# Patient Record
Sex: FEMALE | Race: WHITE | Employment: OTHER | URBAN - METROPOLITAN AREA
[De-identification: names, ages, dates, MRNs, and addresses within clinical notes are randomized per-mention and may not be internally consistent; named-entity substitution may affect disease eponyms.]

---

## 2019-04-02 ENCOUNTER — APPOINTMENT (OUTPATIENT)
Dept: PHYSICAL THERAPY | Age: 69
End: 2019-04-02

## 2019-04-04 ENCOUNTER — HOSPITAL ENCOUNTER (OUTPATIENT)
Dept: PHYSICAL THERAPY | Age: 69
End: 2019-04-04

## 2020-05-21 NOTE — PERIOP NOTES
Pt notified that a negative Covid swab result is required to proceed with surgery; the swab will be collected 7 days prior to surgery at the [facility] at [address] [place] [address]. Appointment scheduled 5/26/20 at 1300.

## 2020-05-26 LAB — SARS-COV-2, NAA: NOT DETECTED

## 2020-06-01 ENCOUNTER — ANESTHESIA EVENT (OUTPATIENT)
Dept: SURGERY | Age: 70
End: 2020-06-01

## 2020-06-01 RX ORDER — DIPHENHYDRAMINE HYDROCHLORIDE 50 MG/ML
12.5 INJECTION, SOLUTION INTRAMUSCULAR; INTRAVENOUS
Status: CANCELLED | OUTPATIENT
Start: 2020-06-01 | End: 2020-06-02

## 2020-06-01 RX ORDER — OXYCODONE HYDROCHLORIDE 5 MG/1
5 TABLET ORAL
Status: CANCELLED | OUTPATIENT
Start: 2020-06-01 | End: 2020-06-02

## 2020-06-01 RX ORDER — SODIUM CHLORIDE 0.9 % (FLUSH) 0.9 %
5-40 SYRINGE (ML) INJECTION EVERY 8 HOURS
Status: CANCELLED | OUTPATIENT
Start: 2020-06-01

## 2020-06-01 RX ORDER — SODIUM CHLORIDE, SODIUM LACTATE, POTASSIUM CHLORIDE, CALCIUM CHLORIDE 600; 310; 30; 20 MG/100ML; MG/100ML; MG/100ML; MG/100ML
100 INJECTION, SOLUTION INTRAVENOUS CONTINUOUS
Status: CANCELLED | OUTPATIENT
Start: 2020-06-01

## 2020-06-01 RX ORDER — OXYCODONE AND ACETAMINOPHEN 5; 325 MG/1; MG/1
1 TABLET ORAL AS NEEDED
Status: CANCELLED | OUTPATIENT
Start: 2020-06-01

## 2020-06-01 RX ORDER — SODIUM CHLORIDE 0.9 % (FLUSH) 0.9 %
5-40 SYRINGE (ML) INJECTION AS NEEDED
Status: CANCELLED | OUTPATIENT
Start: 2020-06-01

## 2020-06-01 RX ORDER — HYDROMORPHONE HYDROCHLORIDE 2 MG/ML
0.5 INJECTION, SOLUTION INTRAMUSCULAR; INTRAVENOUS; SUBCUTANEOUS
Status: CANCELLED | OUTPATIENT
Start: 2020-06-01

## 2020-06-02 ENCOUNTER — ANESTHESIA (OUTPATIENT)
Dept: SURGERY | Age: 70
End: 2020-06-02

## 2020-06-02 ENCOUNTER — HOSPITAL ENCOUNTER (OUTPATIENT)
Age: 70
Setting detail: OUTPATIENT SURGERY
Discharge: HOME | End: 2020-06-02
Attending: SURGERY | Admitting: SURGERY

## 2020-06-02 VITALS
HEART RATE: 64 BPM | DIASTOLIC BLOOD PRESSURE: 70 MMHG | OXYGEN SATURATION: 98 % | RESPIRATION RATE: 16 BRPM | SYSTOLIC BLOOD PRESSURE: 153 MMHG | TEMPERATURE: 98.4 F

## 2020-06-02 RX ORDER — SODIUM CHLORIDE 9 MG/ML
50 INJECTION, SOLUTION INTRAVENOUS CONTINUOUS
Status: DISCONTINUED | OUTPATIENT
Start: 2020-06-02 | End: 2020-06-02 | Stop reason: HOSPADM

## 2020-06-02 RX ORDER — SODIUM CHLORIDE 0.9 % (FLUSH) 0.9 %
5-40 SYRINGE (ML) INJECTION AS NEEDED
Status: DISCONTINUED | OUTPATIENT
Start: 2020-06-02 | End: 2020-06-02 | Stop reason: HOSPADM

## 2020-06-02 RX ORDER — LIDOCAINE HYDROCHLORIDE 10 MG/ML
0.1 INJECTION INFILTRATION; PERINEURAL AS NEEDED
Status: DISCONTINUED | OUTPATIENT
Start: 2020-06-02 | End: 2020-06-02 | Stop reason: HOSPADM

## 2020-06-02 RX ORDER — CEFAZOLIN SODIUM/WATER 2 G/20 ML
2 SYRINGE (ML) INTRAVENOUS ONCE
Status: DISCONTINUED | OUTPATIENT
Start: 2020-06-02 | End: 2020-06-02 | Stop reason: HOSPADM

## 2020-06-02 RX ORDER — FENTANYL CITRATE 50 UG/ML
25 INJECTION, SOLUTION INTRAMUSCULAR; INTRAVENOUS ONCE
Status: DISCONTINUED | OUTPATIENT
Start: 2020-06-02 | End: 2020-06-02 | Stop reason: HOSPADM

## 2020-06-02 RX ORDER — MIDAZOLAM HYDROCHLORIDE 1 MG/ML
2 INJECTION, SOLUTION INTRAMUSCULAR; INTRAVENOUS
Status: DISCONTINUED | OUTPATIENT
Start: 2020-06-02 | End: 2020-06-02 | Stop reason: HOSPADM

## 2020-06-02 RX ORDER — SCOLOPAMINE TRANSDERMAL SYSTEM 1 MG/1
1 PATCH, EXTENDED RELEASE TRANSDERMAL
Status: DISCONTINUED | OUTPATIENT
Start: 2020-06-02 | End: 2020-06-02 | Stop reason: HOSPADM

## 2020-06-02 RX ORDER — SODIUM CHLORIDE, SODIUM LACTATE, POTASSIUM CHLORIDE, CALCIUM CHLORIDE 600; 310; 30; 20 MG/100ML; MG/100ML; MG/100ML; MG/100ML
75 INJECTION, SOLUTION INTRAVENOUS CONTINUOUS
Status: DISCONTINUED | OUTPATIENT
Start: 2020-06-02 | End: 2020-06-02 | Stop reason: HOSPADM

## 2020-06-02 RX ORDER — FAMOTIDINE 20 MG/1
20 TABLET, FILM COATED ORAL ONCE
Status: DISCONTINUED | OUTPATIENT
Start: 2020-06-02 | End: 2020-06-02 | Stop reason: HOSPADM

## 2020-06-02 RX ORDER — SODIUM CHLORIDE 0.9 % (FLUSH) 0.9 %
5-40 SYRINGE (ML) INJECTION EVERY 8 HOURS
Status: DISCONTINUED | OUTPATIENT
Start: 2020-06-02 | End: 2020-06-02 | Stop reason: HOSPADM

## 2020-06-02 RX ORDER — MIDAZOLAM HYDROCHLORIDE 1 MG/ML
2 INJECTION, SOLUTION INTRAMUSCULAR; INTRAVENOUS ONCE
Status: DISCONTINUED | OUTPATIENT
Start: 2020-06-02 | End: 2020-06-02 | Stop reason: HOSPADM

## 2020-06-02 NOTE — PERIOP NOTES
Pt and sister have been kept informed that power was out and there are issues with computers and OR temps. We have been waiting to get approval to proceed with surgery from admin. Dr Milagro Rivera called and said he would like to reschedule his surgeries and for pt to call the office to reschedule. Pt and sister informed.

## 2020-06-02 NOTE — ANESTHESIA PREPROCEDURE EVALUATION
Relevant Problems   No relevant active problems       Anesthetic History     PONV          Review of Systems / Medical History  Patient summary reviewed and pertinent labs reviewed    Pulmonary            Asthma (as child)        Neuro/Psych   Within defined limits           Cardiovascular    Hypertension: well controlled        Dysrhythmias : SVT  Hyperlipidemia    Exercise tolerance: >4 METS  Comments: S/p ablation   GI/Hepatic/Renal     GERD: well controlled           Endo/Other      Hypothyroidism: well controlled  Obesity and arthritis     Other Findings              Physical Exam    Airway  Mallampati: I  TM Distance: 4 - 6 cm  Neck ROM: normal range of motion   Mouth opening: Normal     Cardiovascular  Regular rate and rhythm,  S1 and S2 normal,  no murmur, click, rub, or gallop  Rhythm: regular  Rate: normal         Dental  No notable dental hx       Pulmonary  Breath sounds clear to auscultation               Abdominal  Abdominal exam normal       Other Findings            Anesthetic Plan    ASA: 2  Anesthesia type: general          Induction: Intravenous  Anesthetic plan and risks discussed with: Patient

## 2020-06-02 NOTE — H&P
CC: macromastia    HPI: 67 yo with longstanding history of large breast size, c/o shoulder, back, neck pain . Past Medical History:   Diagnosis Date    Anemia     taking iron    Arrhythmia     had ablation-2008- no problems since    Arthritis     osteoarthritis knees, fingers    Asthma      no problems except in fall season-- no meds regularly    Gastrointestinal disorder     GERD    GERD (gastroesophageal reflux disease)     on medication    High cholesterol     on medication    Hypertension x4 yrs     controlled with med     Morbid obesity ([address]     bmi-42.2 on 5-20-14    Nausea & vomiting     not every time per pt    Other ill-defined conditions(799.89)     hyperlipidemia    Psychiatric disorder     depression    Thyroid disease      hypo-med     Past Surgical History:   Procedure Laterality Date    CARDIAC SURG PROCEDURE UNLIST      Ablation 6/2008 for Arrhythmia    HX APPENDECTOMY  1972    removed with Tubal    HX CHOLECYSTECTOMY      HX HYSTERECTOMY      HX ORTHOPAEDIC  2010    right knee replaced    HX TUBAL LIGATION       Visit Vitals  /70 (BP 1 Location: Right arm, BP Patient Position: At rest)   Pulse 64   Temp 98.4 °F (36.9 °C)   Resp 16   SpO2 98%     A&O x3  RRR  Resp clear  Wise pattern reduction marked in upright position    A/P  Will proceed with wise pattern reduction. She understands risks, benefits, alternatives. Consent affirmed. Understands the possibility of requiring nipple grafting.

## 2020-07-02 ENCOUNTER — HOSPITAL ENCOUNTER (OUTPATIENT)
Dept: LAB | Age: 70
Discharge: HOME | End: 2020-07-02
Payer: MEDICARE

## 2020-07-02 LAB
CREAT SERPL-MCNC: 0.64 MG/DL (ref 0.6–1)
HGB BLD-MCNC: 13.1 G/DL (ref 11.7–15.4)
POTASSIUM SERPL-SCNC: 3.8 MMOL/L (ref 3.5–5.1)

## 2020-07-02 PROCEDURE — 36415 COLL VENOUS BLD VENIPUNCTURE: CPT

## 2020-07-02 PROCEDURE — 84132 ASSAY OF SERUM POTASSIUM: CPT

## 2020-07-02 PROCEDURE — 82565 ASSAY OF CREATININE: CPT

## 2020-07-02 PROCEDURE — 85018 HEMOGLOBIN: CPT

## 2020-07-06 ENCOUNTER — ANESTHESIA EVENT (OUTPATIENT)
Dept: SURGERY | Age: 70
End: 2020-07-06
Payer: MEDICARE

## 2020-07-06 NOTE — PERIOP NOTES
Verified with [facility] that there is no COVID test on file for patient. Spoke with office and confirmed that surgery is urgent and needs to proceed. Approved by Medical Executive Committee.     Pt instructed to arrive to [facility]  Discharge area at [place] [id number] for rapid covid testupon arrival

## 2020-07-07 ENCOUNTER — ANESTHESIA (OUTPATIENT)
Dept: SURGERY | Age: 70
End: 2020-07-07
Payer: MEDICARE

## 2020-07-07 ENCOUNTER — HOSPITAL ENCOUNTER (OUTPATIENT)
Age: 70
Setting detail: OUTPATIENT SURGERY
Discharge: HOME | End: 2020-07-07
Attending: SURGERY | Admitting: SURGERY
Payer: MEDICARE

## 2020-07-07 VITALS
OXYGEN SATURATION: 96 % | RESPIRATION RATE: 16 BRPM | DIASTOLIC BLOOD PRESSURE: 57 MMHG | WEIGHT: 199 LBS | HEART RATE: 81 BPM | SYSTOLIC BLOOD PRESSURE: 140 MMHG | TEMPERATURE: 97.6 F | BODY MASS INDEX: 33.12 KG/M2

## 2020-07-07 DIAGNOSIS — L76.82 PAIN AT SURGICAL INCISION: Primary | ICD-10-CM

## 2020-07-07 LAB
COVID-19 RAPID TEST, COVR: NOT DETECTED
SOURCE, COVRS: NORMAL

## 2020-07-07 PROCEDURE — 77030019908 HC STETH ESOPH SIMS -A: Performed by: ANESTHESIOLOGY

## 2020-07-07 PROCEDURE — 77030040922 HC BLNKT HYPOTHRM STRY -A: Performed by: ANESTHESIOLOGY

## 2020-07-07 PROCEDURE — 74011250636 HC RX REV CODE- 250/636: Performed by: SURGERY

## 2020-07-07 PROCEDURE — 74011000250 HC RX REV CODE- 250: Performed by: SURGERY

## 2020-07-07 PROCEDURE — 74011250636 HC RX REV CODE- 250/636: Performed by: REGISTERED NURSE

## 2020-07-07 PROCEDURE — 77030013629 HC ELECTRD NDL STRY -B: Performed by: SURGERY

## 2020-07-07 PROCEDURE — 77030033138 HC SUT PGA STRATFX J&J -B: Performed by: SURGERY

## 2020-07-07 PROCEDURE — 77030003666 HC NDL SPINAL BD -A: Performed by: SURGERY

## 2020-07-07 PROCEDURE — 74011000250 HC RX REV CODE- 250: Performed by: ANESTHESIOLOGY

## 2020-07-07 PROCEDURE — 77030037088 HC TUBE ENDOTRACH ORAL NSL COVD-A: Performed by: ANESTHESIOLOGY

## 2020-07-07 PROCEDURE — 77030031139 HC SUT VCRL2 J&J -A: Performed by: SURGERY

## 2020-07-07 PROCEDURE — 76060000037 HC ANESTHESIA 3 TO 3.5 HR: Performed by: SURGERY

## 2020-07-07 PROCEDURE — 88307 TISSUE EXAM BY PATHOLOGIST: CPT

## 2020-07-07 PROCEDURE — 74011250636 HC RX REV CODE- 250/636: Performed by: ANESTHESIOLOGY

## 2020-07-07 PROCEDURE — 87635 SARS-COV-2 COVID-19 AMP PRB: CPT

## 2020-07-07 PROCEDURE — 77030040361 HC SLV COMPR DVT MDII -B: Performed by: SURGERY

## 2020-07-07 PROCEDURE — 76010000172 HC OR TIME 2.5 TO 3 HR INTENSV-TIER 1: Performed by: SURGERY

## 2020-07-07 PROCEDURE — 74011000250 HC RX REV CODE- 250: Performed by: REGISTERED NURSE

## 2020-07-07 PROCEDURE — 77030008459 HC STPLR SKN COOP -B: Performed by: SURGERY

## 2020-07-07 PROCEDURE — 76210000063 HC OR PH I REC FIRST 0.5 HR: Performed by: SURGERY

## 2020-07-07 PROCEDURE — 77030041445 HC PENCL ELECT SMK EVAC STRY -B: Performed by: SURGERY

## 2020-07-07 PROCEDURE — 77030039425 HC BLD LARYNG TRULITE DISP TELE -A: Performed by: ANESTHESIOLOGY

## 2020-07-07 PROCEDURE — 76210000021 HC REC RM PH II 0.5 TO 1 HR: Performed by: SURGERY

## 2020-07-07 PROCEDURE — 77030008534 HC TBNG LIPOSUC BYRO -B: Performed by: SURGERY

## 2020-07-07 PROCEDURE — 77030008462 HC STPLR SKN PROX J&J -A: Performed by: SURGERY

## 2020-07-07 PROCEDURE — 77030018836 HC SOL IRR NACL ICUM -A: Performed by: SURGERY

## 2020-07-07 RX ORDER — SODIUM CHLORIDE, SODIUM LACTATE, POTASSIUM CHLORIDE, CALCIUM CHLORIDE 600; 310; 30; 20 MG/100ML; MG/100ML; MG/100ML; MG/100ML
75 INJECTION, SOLUTION INTRAVENOUS CONTINUOUS
Status: DISCONTINUED | OUTPATIENT
Start: 2020-07-07 | End: 2020-07-07 | Stop reason: HOSPADM

## 2020-07-07 RX ORDER — OXYCODONE AND ACETAMINOPHEN 10; 325 MG/1; MG/1
1 TABLET ORAL AS NEEDED
Status: DISCONTINUED | OUTPATIENT
Start: 2020-07-07 | End: 2020-07-07 | Stop reason: HOSPADM

## 2020-07-07 RX ORDER — HYDROCODONE BITARTRATE AND ACETAMINOPHEN 5; 325 MG/1; MG/1
1 TABLET ORAL
Qty: 30 TAB | Refills: 0 | Status: SHIPPED | OUTPATIENT
Start: 2020-07-07 | End: 2020-07-10

## 2020-07-07 RX ORDER — SODIUM CHLORIDE 0.9 % (FLUSH) 0.9 %
5-40 SYRINGE (ML) INJECTION EVERY 8 HOURS
Status: DISCONTINUED | OUTPATIENT
Start: 2020-07-07 | End: 2020-07-07 | Stop reason: HOSPADM

## 2020-07-07 RX ORDER — DEXAMETHASONE SODIUM PHOSPHATE 4 MG/ML
INJECTION, SOLUTION INTRA-ARTICULAR; INTRALESIONAL; INTRAMUSCULAR; INTRAVENOUS; SOFT TISSUE AS NEEDED
Status: DISCONTINUED | OUTPATIENT
Start: 2020-07-07 | End: 2020-07-07 | Stop reason: HOSPADM

## 2020-07-07 RX ORDER — SODIUM CHLORIDE 0.9 % (FLUSH) 0.9 %
5-40 SYRINGE (ML) INJECTION AS NEEDED
Status: DISCONTINUED | OUTPATIENT
Start: 2020-07-07 | End: 2020-07-07 | Stop reason: HOSPADM

## 2020-07-07 RX ORDER — EPINEPHRINE 1 MG/ML
INJECTION, SOLUTION, CONCENTRATE INTRAVENOUS AS NEEDED
Status: DISCONTINUED | OUTPATIENT
Start: 2020-07-07 | End: 2020-07-07 | Stop reason: HOSPADM

## 2020-07-07 RX ORDER — ONDANSETRON 2 MG/ML
INJECTION INTRAMUSCULAR; INTRAVENOUS AS NEEDED
Status: DISCONTINUED | OUTPATIENT
Start: 2020-07-07 | End: 2020-07-07 | Stop reason: HOSPADM

## 2020-07-07 RX ORDER — ROCURONIUM BROMIDE 10 MG/ML
INJECTION, SOLUTION INTRAVENOUS AS NEEDED
Status: DISCONTINUED | OUTPATIENT
Start: 2020-07-07 | End: 2020-07-07 | Stop reason: HOSPADM

## 2020-07-07 RX ORDER — FENTANYL CITRATE 50 UG/ML
INJECTION, SOLUTION INTRAMUSCULAR; INTRAVENOUS AS NEEDED
Status: DISCONTINUED | OUTPATIENT
Start: 2020-07-07 | End: 2020-07-07 | Stop reason: HOSPADM

## 2020-07-07 RX ORDER — EPHEDRINE SULFATE/0.9% NACL/PF 50 MG/5 ML
SYRINGE (ML) INTRAVENOUS AS NEEDED
Status: DISCONTINUED | OUTPATIENT
Start: 2020-07-07 | End: 2020-07-07 | Stop reason: HOSPADM

## 2020-07-07 RX ORDER — CEFAZOLIN SODIUM/WATER 2 G/20 ML
2 SYRINGE (ML) INTRAVENOUS ONCE
Status: COMPLETED | OUTPATIENT
Start: 2020-07-07 | End: 2020-07-07

## 2020-07-07 RX ORDER — LIDOCAINE HYDROCHLORIDE 10 MG/ML
INJECTION INFILTRATION; PERINEURAL AS NEEDED
Status: DISCONTINUED | OUTPATIENT
Start: 2020-07-07 | End: 2020-07-07 | Stop reason: HOSPADM

## 2020-07-07 RX ORDER — PROPOFOL 10 MG/ML
INJECTION, EMULSION INTRAVENOUS AS NEEDED
Status: DISCONTINUED | OUTPATIENT
Start: 2020-07-07 | End: 2020-07-07 | Stop reason: HOSPADM

## 2020-07-07 RX ORDER — NEOSTIGMINE METHYLSULFATE 1 MG/ML
INJECTION, SOLUTION INTRAVENOUS AS NEEDED
Status: DISCONTINUED | OUTPATIENT
Start: 2020-07-07 | End: 2020-07-07 | Stop reason: HOSPADM

## 2020-07-07 RX ORDER — LIDOCAINE HYDROCHLORIDE 20 MG/ML
INJECTION, SOLUTION EPIDURAL; INFILTRATION; INTRACAUDAL; PERINEURAL AS NEEDED
Status: DISCONTINUED | OUTPATIENT
Start: 2020-07-07 | End: 2020-07-07 | Stop reason: HOSPADM

## 2020-07-07 RX ORDER — OXYCODONE HYDROCHLORIDE 5 MG/1
5 TABLET ORAL
Status: DISCONTINUED | OUTPATIENT
Start: 2020-07-07 | End: 2020-07-07 | Stop reason: HOSPADM

## 2020-07-07 RX ORDER — GLYCOPYRROLATE 0.2 MG/ML
INJECTION INTRAMUSCULAR; INTRAVENOUS AS NEEDED
Status: DISCONTINUED | OUTPATIENT
Start: 2020-07-07 | End: 2020-07-07 | Stop reason: HOSPADM

## 2020-07-07 RX ORDER — HYDROMORPHONE HYDROCHLORIDE 2 MG/ML
0.5 INJECTION, SOLUTION INTRAMUSCULAR; INTRAVENOUS; SUBCUTANEOUS
Status: DISCONTINUED | OUTPATIENT
Start: 2020-07-07 | End: 2020-07-07 | Stop reason: HOSPADM

## 2020-07-07 RX ADMIN — ROCURONIUM BROMIDE 10 MG: 10 INJECTION, SOLUTION INTRAVENOUS at 09:36

## 2020-07-07 RX ADMIN — SODIUM CHLORIDE, SODIUM LACTATE, POTASSIUM CHLORIDE, AND CALCIUM CHLORIDE: 600; 310; 30; 20 INJECTION, SOLUTION INTRAVENOUS at 10:30

## 2020-07-07 RX ADMIN — Medication 10 MG: at 08:19

## 2020-07-07 RX ADMIN — Medication 3 MG: at 10:35

## 2020-07-07 RX ADMIN — PHENYLEPHRINE HYDROCHLORIDE 100 MCG: 10 INJECTION INTRAVENOUS at 09:14

## 2020-07-07 RX ADMIN — PHENYLEPHRINE HYDROCHLORIDE 50 MCG: 10 INJECTION INTRAVENOUS at 08:03

## 2020-07-07 RX ADMIN — LIDOCAINE HYDROCHLORIDE 100 MG: 20 INJECTION, SOLUTION EPIDURAL; INFILTRATION; INTRACAUDAL; PERINEURAL at 07:52

## 2020-07-07 RX ADMIN — Medication 2 G: at 08:00

## 2020-07-07 RX ADMIN — PHENYLEPHRINE HYDROCHLORIDE 50 MCG: 10 INJECTION INTRAVENOUS at 09:44

## 2020-07-07 RX ADMIN — PROMETHAZINE HYDROCHLORIDE 3 MG: 25 INJECTION INTRAMUSCULAR; INTRAVENOUS at 11:06

## 2020-07-07 RX ADMIN — FENTANYL CITRATE 50 MCG: 50 INJECTION INTRAMUSCULAR; INTRAVENOUS at 09:50

## 2020-07-07 RX ADMIN — ROCURONIUM BROMIDE 10 MG: 10 INJECTION, SOLUTION INTRAVENOUS at 08:47

## 2020-07-07 RX ADMIN — PROPOFOL 20 MG: 10 INJECTION, EMULSION INTRAVENOUS at 07:55

## 2020-07-07 RX ADMIN — SODIUM CHLORIDE, SODIUM LACTATE, POTASSIUM CHLORIDE, AND CALCIUM CHLORIDE 75 ML/HR: 600; 310; 30; 20 INJECTION, SOLUTION INTRAVENOUS at 06:42

## 2020-07-07 RX ADMIN — FENTANYL CITRATE 50 MCG: 50 INJECTION INTRAMUSCULAR; INTRAVENOUS at 08:32

## 2020-07-07 RX ADMIN — Medication 10 MG: at 08:50

## 2020-07-07 RX ADMIN — PHENYLEPHRINE HYDROCHLORIDE 50 MCG: 10 INJECTION INTRAVENOUS at 08:17

## 2020-07-07 RX ADMIN — PROPOFOL 150 MG: 10 INJECTION, EMULSION INTRAVENOUS at 07:53

## 2020-07-07 RX ADMIN — ONDANSETRON 4 MG: 2 INJECTION INTRAMUSCULAR; INTRAVENOUS at 10:33

## 2020-07-07 RX ADMIN — ROCURONIUM BROMIDE 40 MG: 10 INJECTION, SOLUTION INTRAVENOUS at 07:53

## 2020-07-07 RX ADMIN — Medication 10 MG: at 08:10

## 2020-07-07 RX ADMIN — GLYCOPYRROLATE 0.4 MG: 0.2 INJECTION, SOLUTION INTRAMUSCULAR; INTRAVENOUS at 10:35

## 2020-07-07 RX ADMIN — DEXAMETHASONE SODIUM PHOSPHATE 4 MG: 4 INJECTION, SOLUTION INTRAMUSCULAR; INTRAVENOUS at 08:12

## 2020-07-07 RX ADMIN — FENTANYL CITRATE 50 MCG: 50 INJECTION INTRAMUSCULAR; INTRAVENOUS at 07:52

## 2020-07-07 RX ADMIN — Medication 5 MG: at 08:56

## 2020-07-07 RX ADMIN — PHENYLEPHRINE HYDROCHLORIDE 50 MCG: 10 INJECTION INTRAVENOUS at 09:33

## 2020-07-07 RX ADMIN — PHENYLEPHRINE HYDROCHLORIDE 100 MCG: 10 INJECTION INTRAVENOUS at 10:07

## 2020-07-07 NOTE — DISCHARGE INSTRUCTIONS
No lifting more than 5 lbs  Keep arms below shoulder level  Keep dressing dry and intact until return to office. ACTIVITY  · As tolerated and as directed by your doctor. · Bathe or shower as directed by your doctor. DIET  · Clear liquids until no nausea or vomiting; then light diet for the first day. · Advance to regular diet on second day, unless your doctor orders otherwise. · If nausea and vomiting continues, call your doctor. PAIN  · Take pain medication as directed by your doctor. · Call your doctor if pain is NOT relieved by medication. · DO NOT take aspirin of blood thinners unless directed by your doctor. CALL YOUR DOCTOR IF   · Excessive bleeding that does not stop after holding pressure over the area  · Temperature of 101 degrees F or above  · Excessive redness, swelling or bruising, and/ or green or yellow, smelly discharge from incision    AFTER ANESTHESIA   · For the first 24 hours: DO NOT Drive, Drink alcoholic beverages, or Make important decisions. · Be aware of dizziness following anesthesia and while taking pain medication. DISCHARGE SUMMARY from Nurse    PATIENT INSTRUCTIONS:    After general anesthesia or intravenous sedation, for 24 hours or while taking prescription Narcotics:  · Limit your activities  · Do not drive and operate hazardous machinery  · Do not make important personal or business decisions  · Do  not drink alcoholic beverages  · If you have not urinated within 8 hours after discharge, please contact your surgeon on call. *  Please give a list of your current medications to your Primary Care Provider. *  Please update this list whenever your medications are discontinued, doses are      changed, or new medications (including over-the-counter products) are added. *  Please carry medication information at all times in case of emergency situations.       These are general instructions for a healthy lifestyle:    No smoking/ No tobacco products/ Avoid exposure to second hand smoke    Surgeon General's Warning:  Quitting smoking now greatly reduces serious risk to your health. Obesity, smoking, and sedentary lifestyle greatly increases your risk for illness    A healthy diet, regular physical exercise & weight monitoring are important for maintaining a healthy lifestyle    You may be retaining fluid if you have a history of heart failure or if you experience any of the following symptoms:  Weight gain of 3 pounds or more overnight or 5 pounds in a week, increased swelling in our hands or feet or shortness of breath while lying flat in bed. Please call your doctor as soon as you notice any of these symptoms; do not wait until your next office visit. Recognize signs and symptoms of STROKE:    F-face looks uneven    A-arms unable to move or move unevenly    S-speech slurred or non-existent    T-time-call 911 as soon as signs and symptoms begin-DO NOT go       Back to bed or wait to see if you get better-TIME IS BRAIN. Learning About COVID-19 and Social Distancing  What is it? Social distancing means putting space between yourself and other people. The recommended distance is 6 feet, or about 2 meters. This also means staying away from any place where people may gather, such as [place] or other public gathering places. Why is it important? Social distancing is the best way to reduce the spread of COVID-19. This virus seems to spread from person to person through droplets from coughing and sneezing. So if you keep your distance from others, you're less likely to get it or spread it. And social distancing is important for everyone, not just those who are at high risk of infection, like older people. You might have the virus but not have symptoms. You could then give the infection to someone you come into contact with. How is it done? Putting 6 feet, or about 2 meters, between you and other people is the recommended distance.  Also stay away from any place where people may gather, such as [place] or other public gathering places. So if possible:  · Work from home, and keep your kids at home. · Don't travel if you don't have to. And avoid public transportation, ride-shares, and taxis unless you have no choice. · Limit shopping to essentials, like food and medicines. · Wear a cloth face cover if you have to go to a public place like the grocery store or pharmacy. · Don't eat in restaurants. (You can still get takeout or food deliveries.)  · Avoid crowds and busy places. Follow stay-at-home orders or other directions for your area. Current as of: May 8, 2020               Content Version: 12.5  © 2006-2020 [employer]. Care instructions adapted under license by [employer] (which disclaims liability or warranty for this information). If you have questions about a medical condition or this instruction, always ask your healthcare professional. [place] [address] any warranty or liability for your use of this information.

## 2020-07-07 NOTE — H&P
CC: Symptomatic Macromastia    HPI: 67 yo longstanding history of large breast size and associated back and neck pain. Presents for breast reduction    Past Medical History:   Diagnosis Date    Anemia     taking iron    Arrhythmia     had ablation-2008- no problems since    Arthritis     osteoarthritis knees, fingers    Asthma      no problems except in fall season-- no meds regularly    Chronic pain     back    Gastrointestinal disorder     GERD    GERD (gastroesophageal reflux disease)     controlled with medication    High cholesterol     on medication    Hypertension x4 yrs     controlled with med     Morbid obesity ([address]     bmi-42.2 on 5-20-14    Nausea & vomiting     not every time per pt    Other ill-defined conditions(799.89)     hyperlipidemia    Psychiatric disorder     depression    Thyroid disease      hypo-med     Past Surgical History:   Procedure Laterality Date    CARDIAC SURG PROCEDURE UNLIST      Ablation 6/2008 for Arrhythmia    HX APPENDECTOMY  1972    removed with Tubal    HX CHOLECYSTECTOMY      HX KNEE REPLACEMENT Right 2010    HX KNEE REPLACEMENT Left 2014    HX TEN AND BSO      HX TUBAL LIGATION       A&O x3  RRR  Resp clear  Wise pattern reduction marked    A/P  Will proceed with bilateral breast reduction. Risks, benefits and alternatives again discussed.

## 2020-07-07 NOTE — ANESTHESIA POSTPROCEDURE EVALUATION
Procedure(s):  BILATERAL BREAST REDUCTION. general    Anesthesia Post Evaluation      Multimodal analgesia: multimodal analgesia used between 6 hours prior to anesthesia start to PACU discharge  Patient location during evaluation: PACU  Patient participation: complete - patient participated  Level of consciousness: awake  Pain management: adequate  Airway patency: patent  Anesthetic complications: no  Cardiovascular status: acceptable  Respiratory status: acceptable  Hydration status: acceptable  Post anesthesia nausea and vomiting:  none  Final Post Anesthesia Temperature Assessment:  Normothermia (36.0-37.5 degrees C)      INITIAL Post-op Vital signs:   Vitals Value Taken Time   /58 7/7/2020 11:11 AM   Temp 36.4 °C (97.6 °F) 7/7/2020 10:53 AM   Pulse 85 7/7/2020 11:13 AM   Resp 18 7/7/2020 10:53 AM   SpO2 95 % 7/7/2020 11:13 AM   Vitals shown include unvalidated device data.

## 2020-07-10 LAB
COVID-19 RAPID TEST, COVR: NORMAL
COVID-19, XGCOVT: NORMAL
SARS-COV-2, COV2NT: NORMAL
SOURCE, COVRS: NORMAL

## 2020-07-15 NOTE — BRIEF OP NOTE
Operative Note    Patient: Srinivasan Moon  YOB: 1950  MRN: [id number]    Date of Procedure: 7/7/2020     Pre-Op Diagnosis: Hypertrophy of breast [N62]    Post-Op Diagnosis: Same      Procedure(s):  BILATERAL BREAST REDUCTION    Surgeon(s):  Devon Malagon MD    Surgical Assistant: None    Anesthesia: General     Estimated Blood Loss (mL): less than 100     Specimens:   ID Type Source Tests Collected by Time Destination   1 : Right breast tissue Lanny Crain MD 7/7/2020 1047 Pathology   2 : Leftt breast tissue Lanny Crain MD 7/7/2020 1047 Pathology      Prior to procedure informed consent obtained from patient follow discussion of risks, benefits and alternatives including but not limited to asymmetry, deformity, pain, need for further surgery, infection, bleeding, tissue loss. Wise pattern reduction marked in holding with patient in the upright position marking the midline, breast meridian, lateral and medial breast margins and new NAC position at the at the level of the [facility]     Patient was brought to the OR. General anesthesia induced. Surgical timeout performed. Previous marks reinforced. 100 ml of 0.25% Lidocaine with epi infiltrated on the right.     Attention first turned to the right breast. All of the marked incisions were scored and the pedicle de-epithelialized. Incisions then made full thickness. Central mound inferior pedicle developed sharply to the chest wall. Lateral and then medial flap thinned sharply.      Keyhole then sharply excised. Specimen passed off the field. Pedicle then trimmed taking care to maintain lateral chest wall tissue as well as NAC vascularity. Wound irrigated and hemostasis obtained with electrocautery. Temporary closure obtained with staples.     550 g of tissue excised on the right.     Mirror image procedure performed on the left side.      620g of tissue excised on the left.     Patient was positioned upright on the OR table and volume/symmetry re-assessed and found to be excellent. Staples were removed and again surgical field irrigated with warm NS prior to closure. Hemostasis obtained with cautery.      Closure was completed bilaterally with: intermittent 3-0 vicryls at the inferior T and horizontal, cardinal points of the NAC. Running deep 3-0 vicryl at the IMF. Running 3-0 monoderm quill intracuticular completed the closure. NAC perfusion re-assessed following closure and without immediate compromise.     Formal breast dressing applied over xeroform on incisions.

## 2020-07-15 NOTE — OP NOTES
Operative Note    Patient: Lavon Lr  YOB: 1950  MRN: [id number]    Date of Procedure: 7/7/2020     Pre-Op Diagnosis: Hypertrophy of breast [N62]    Post-Op Diagnosis: Same      Procedure(s):  BILATERAL BREAST REDUCTION    Surgeon(s):  Niles Tillman MD    Surgical Assistant: None    Anesthesia: General     Estimated Blood Loss (mL): less than 100     Specimens:   ID Type Source Tests Collected by Time Destination   1 : Right breast tissue Kana Crain MD 7/7/2020 1047 Pathology   2 : Leftt breast tissue Kana Crain MD 7/7/2020 1047 Pathology      Prior to procedure informed consent obtained from patient follow discussion of risks, benefits and alternatives including but not limited to asymmetry, deformity, pain, need for further surgery, infection, bleeding, tissue loss. Wise pattern reduction marked in holding with patient in the upright position marking the midline, breast meridian, lateral and medial breast margins and new NAC position at the at the level of the [facility]     Patient was brought to the OR. General anesthesia induced. Surgical timeout performed. Previous marks reinforced. 100 ml of 0.25% Lidocaine with epi infiltrated on the right.     Attention first turned to the right breast. All of the marked incisions were scored and the pedicle de-epithelialized. Incisions then made full thickness. Central mound inferior pedicle developed sharply to the chest wall. Lateral and then medial flap thinned sharply.      Keyhole then sharply excised. Specimen passed off the field. Pedicle then trimmed taking care to maintain lateral chest wall tissue as well as NAC vascularity. Wound irrigated and hemostasis obtained with electrocautery. Temporary closure obtained with staples.     550 g of tissue excised on the right.     Mirror image procedure performed on the left side.      620g of tissue excised on the left.     Patient was positioned upright on the OR table and volume/symmetry re-assessed and found to be excellent. Staples were removed and again surgical field irrigated with warm NS prior to closure. Hemostasis obtained with cautery.      Closure was completed bilaterally with: intermittent 3-0 vicryls at the inferior T and horizontal, cardinal points of the NAC. Running deep 3-0 vicryl at the IMF. Running 3-0 monoderm quill intracuticular completed the closure. NAC perfusion re-assessed following closure and without immediate compromise.     Formal breast dressing applied over xeroform on incisions.

## 2024-10-03 ENCOUNTER — HOSPITAL ENCOUNTER (INPATIENT)
Age: 74
LOS: 8 days | Discharge: NURSING, REHAB OR LONG-TERM CARE | End: 2024-10-11
Admitting: FAMILY MEDICINE
Payer: MEDICARE

## 2024-10-03 DIAGNOSIS — K56.609 SMALL BOWEL OBSTRUCTION (HCC): Primary | ICD-10-CM

## 2024-10-03 DIAGNOSIS — R10.9 ABDOMINAL PAIN OF UNKNOWN CAUSE: ICD-10-CM

## 2024-10-03 DIAGNOSIS — K55.9 ISCHEMIC BOWEL DISEASE (HCC): ICD-10-CM

## 2024-10-03 DIAGNOSIS — R00.0 TACHYCARDIA: ICD-10-CM

## 2024-10-03 DIAGNOSIS — D72.829 LEUKOCYTOSIS, UNSPECIFIED TYPE: ICD-10-CM

## 2024-10-03 PROCEDURE — 85025 COMPLETE CBC W/AUTO DIFF WBC: CPT

## 2024-10-03 PROCEDURE — 83605 ASSAY OF LACTIC ACID: CPT

## 2024-10-03 PROCEDURE — 83690 ASSAY OF LIPASE: CPT

## 2024-10-03 PROCEDURE — 80053 COMPREHEN METABOLIC PANEL: CPT

## 2024-10-03 PROCEDURE — 36415 COLL VENOUS BLD VENIPUNCTURE: CPT

## 2024-10-03 PROCEDURE — 99285 EMERGENCY DEPT VISIT HI MDM: CPT

## 2024-10-03 PROCEDURE — 1100000003 HC PRIVATE W/ TELEMETRY

## 2024-10-03 PROCEDURE — 6360000004 HC RX CONTRAST MEDICATION

## 2024-10-03 RX ORDER — HYDRALAZINE HYDROCHLORIDE 20 MG/ML
5 INJECTION INTRAMUSCULAR; INTRAVENOUS EVERY 6 HOURS PRN
Status: DISCONTINUED | OUTPATIENT
Start: 2024-10-03 | End: 2024-10-09

## 2024-10-03 RX ORDER — ONDANSETRON 2 MG/ML
4 INJECTION INTRAMUSCULAR; INTRAVENOUS EVERY 6 HOURS PRN
Status: DISCONTINUED | OUTPATIENT
Start: 2024-10-03 | End: 2024-10-11 | Stop reason: HOSPADM

## 2024-10-03 RX ORDER — SODIUM CHLORIDE 0.9 % (FLUSH) 0.9 %
5-40 SYRINGE (ML) INJECTION PRN
Status: DISCONTINUED | OUTPATIENT
Start: 2024-10-03 | End: 2024-10-11 | Stop reason: HOSPADM

## 2024-10-03 RX ORDER — ONDANSETRON 2 MG/ML
INJECTION INTRAMUSCULAR; INTRAVENOUS
Status: DISPENSED
Start: 2024-10-03 | End: 2024-10-04

## 2024-10-03 RX ORDER — ONDANSETRON 4 MG/1
4 TABLET, ORALLY DISINTEGRATING ORAL EVERY 8 HOURS PRN
Status: DISCONTINUED | OUTPATIENT
Start: 2024-10-03 | End: 2024-10-11 | Stop reason: HOSPADM

## 2024-10-03 RX ORDER — POTASSIUM CHLORIDE 7.45 MG/ML
10 INJECTION INTRAVENOUS PRN
Status: DISCONTINUED | OUTPATIENT
Start: 2024-10-03 | End: 2024-10-07

## 2024-10-03 RX ORDER — ACETAMINOPHEN 325 MG/1
650 TABLET ORAL EVERY 6 HOURS PRN
Status: DISCONTINUED | OUTPATIENT
Start: 2024-10-03 | End: 2024-10-11 | Stop reason: HOSPADM

## 2024-10-03 RX ORDER — SODIUM CHLORIDE, SODIUM LACTATE, POTASSIUM CHLORIDE, CALCIUM CHLORIDE 600; 310; 30; 20 MG/100ML; MG/100ML; MG/100ML; MG/100ML
INJECTION, SOLUTION INTRAVENOUS CONTINUOUS
Status: ACTIVE | OUTPATIENT
Start: 2024-10-03 | End: 2024-10-07

## 2024-10-03 RX ORDER — MAGNESIUM SULFATE IN WATER 40 MG/ML
2000 INJECTION, SOLUTION INTRAVENOUS PRN
Status: DISCONTINUED | OUTPATIENT
Start: 2024-10-03 | End: 2024-10-11 | Stop reason: HOSPADM

## 2024-10-03 RX ORDER — SODIUM CHLORIDE 9 MG/ML
INJECTION, SOLUTION INTRAVENOUS PRN
Status: DISCONTINUED | OUTPATIENT
Start: 2024-10-03 | End: 2024-10-10

## 2024-10-03 RX ORDER — ACETAMINOPHEN 650 MG/1
650 SUPPOSITORY RECTAL EVERY 6 HOURS PRN
Status: DISCONTINUED | OUTPATIENT
Start: 2024-10-03 | End: 2024-10-11 | Stop reason: HOSPADM

## 2024-10-03 RX ORDER — SODIUM CHLORIDE 0.9 % (FLUSH) 0.9 %
5-40 SYRINGE (ML) INJECTION EVERY 12 HOURS SCHEDULED
Status: DISCONTINUED | OUTPATIENT
Start: 2024-10-03 | End: 2024-10-06

## 2024-10-03 RX ORDER — ENOXAPARIN SODIUM 100 MG/ML
40 INJECTION SUBCUTANEOUS DAILY
Status: DISCONTINUED | OUTPATIENT
Start: 2024-10-04 | End: 2024-10-11 | Stop reason: HOSPADM

## 2024-10-03 RX ORDER — POLYETHYLENE GLYCOL 3350 17 G/17G
17 POWDER, FOR SOLUTION ORAL DAILY PRN
Status: DISCONTINUED | OUTPATIENT
Start: 2024-10-03 | End: 2024-10-11 | Stop reason: HOSPADM

## 2024-10-03 RX ORDER — POTASSIUM CHLORIDE 1500 MG/1
40 TABLET, EXTENDED RELEASE ORAL PRN
Status: DISCONTINUED | OUTPATIENT
Start: 2024-10-03 | End: 2024-10-07

## 2024-10-03 RX ORDER — MORPHINE SULFATE 2 MG/ML
1 INJECTION, SOLUTION INTRAMUSCULAR; INTRAVENOUS EVERY 4 HOURS PRN
Status: DISCONTINUED | OUTPATIENT
Start: 2024-10-03 | End: 2024-10-11 | Stop reason: HOSPADM

## 2024-10-03 RX ORDER — MORPHINE SULFATE 4 MG/ML
INJECTION, SOLUTION INTRAMUSCULAR; INTRAVENOUS
Status: DISPENSED
Start: 2024-10-03 | End: 2024-10-04

## 2024-10-03 RX ORDER — MORPHINE SULFATE 2 MG/ML
2 INJECTION, SOLUTION INTRAMUSCULAR; INTRAVENOUS EVERY 4 HOURS PRN
Status: DISCONTINUED | OUTPATIENT
Start: 2024-10-03 | End: 2024-10-07

## 2024-10-03 RX ADMIN — IOPAMIDOL 100 ML: 755 INJECTION, SOLUTION INTRAVENOUS at 21:50

## 2024-10-04 ENCOUNTER — ANESTHESIA (OUTPATIENT)
Dept: SURGERY | Age: 74
End: 2024-10-04
Payer: MEDICARE

## 2024-10-04 ENCOUNTER — APPOINTMENT (OUTPATIENT)
Dept: GENERAL RADIOLOGY | Age: 74
End: 2024-10-04
Payer: MEDICARE

## 2024-10-04 ENCOUNTER — APPOINTMENT (OUTPATIENT)
Dept: CT IMAGING | Age: 74
End: 2024-10-04
Payer: MEDICARE

## 2024-10-04 ENCOUNTER — ANESTHESIA EVENT (OUTPATIENT)
Dept: SURGERY | Age: 74
End: 2024-10-04
Payer: MEDICARE

## 2024-10-04 LAB
ABO + RH BLD: NORMAL
ANION GAP SERPL CALC-SCNC: 19 MMOL/L (ref 9–18)
APPEARANCE UR: CLEAR
BACTERIA URNS QL MICRO: NEGATIVE /HPF
BASE DEFICIT BLDV-SCNC: 3 MMOL/L
BASOPHILS # BLD: 0 K/UL (ref 0–0.2)
BASOPHILS # BLD: 0 K/UL (ref 0–0.2)
BASOPHILS NFR BLD: 0 % (ref 0–2)
BASOPHILS NFR BLD: 0 % (ref 0–2)
BILIRUB UR QL: NEGATIVE
BLOOD GROUP ANTIBODIES SERPL: NORMAL
BUN SERPL-MCNC: 28 MG/DL (ref 8–23)
CALCIUM SERPL-MCNC: 8.9 MG/DL (ref 8.8–10.2)
CHLORIDE SERPL-SCNC: 101 MMOL/L (ref 98–107)
CO2 SERPL-SCNC: 18 MMOL/L (ref 20–28)
COLOR UR: ABNORMAL
CREAT SERPL-MCNC: 1.32 MG/DL (ref 0.6–1.1)
DIFFERENTIAL METHOD BLD: ABNORMAL
DIFFERENTIAL METHOD BLD: ABNORMAL
EOSINOPHIL # BLD: 0 K/UL (ref 0–0.8)
EOSINOPHIL # BLD: 0 K/UL (ref 0–0.8)
EOSINOPHIL NFR BLD: 0 % (ref 0.5–7.8)
EOSINOPHIL NFR BLD: 0 % (ref 0.5–7.8)
EPI CELLS #/AREA URNS HPF: ABNORMAL /HPF
ERYTHROCYTE [DISTWIDTH] IN BLOOD BY AUTOMATED COUNT: 12.1 % (ref 11.9–14.6)
ERYTHROCYTE [DISTWIDTH] IN BLOOD BY AUTOMATED COUNT: 12.2 % (ref 11.9–14.6)
GLUCOSE BLD STRIP.AUTO-MCNC: 130 MG/DL (ref 65–100)
GLUCOSE BLD STRIP.AUTO-MCNC: 134 MG/DL (ref 65–100)
GLUCOSE SERPL-MCNC: 220 MG/DL (ref 70–99)
GLUCOSE UR STRIP.AUTO-MCNC: 250 MG/DL
HCO3 BLDV-SCNC: 23.3 MMOL/L (ref 23–28)
HCT VFR BLD AUTO: 48.5 % (ref 35.8–46.3)
HCT VFR BLD AUTO: 52.9 % (ref 35.8–46.3)
HGB BLD-MCNC: 16.6 G/DL (ref 11.7–15.4)
HGB BLD-MCNC: 17 G/DL (ref 11.7–15.4)
HGB UR QL STRIP: NEGATIVE
HYALINE CASTS URNS QL MICRO: ABNORMAL /LPF
IMM GRANULOCYTES # BLD AUTO: 0.1 K/UL (ref 0–0.5)
IMM GRANULOCYTES # BLD AUTO: 0.2 K/UL (ref 0–0.5)
IMM GRANULOCYTES NFR BLD AUTO: 1 % (ref 0–5)
IMM GRANULOCYTES NFR BLD AUTO: 1 % (ref 0–5)
KETONES UR QL STRIP.AUTO: 15 MG/DL
LEUKOCYTE ESTERASE UR QL STRIP.AUTO: NEGATIVE
LYMPHOCYTES # BLD: 0.6 K/UL (ref 0.5–4.6)
LYMPHOCYTES # BLD: 0.7 K/UL (ref 0.5–4.6)
LYMPHOCYTES NFR BLD: 3 % (ref 13–44)
LYMPHOCYTES NFR BLD: 4 % (ref 13–44)
MCH RBC QN AUTO: 32 PG (ref 26.1–32.9)
MCH RBC QN AUTO: 32.4 PG (ref 26.1–32.9)
MCHC RBC AUTO-ENTMCNC: 32.1 G/DL (ref 31.4–35)
MCHC RBC AUTO-ENTMCNC: 34.2 G/DL (ref 31.4–35)
MCV RBC AUTO: 94.5 FL (ref 82–102)
MCV RBC AUTO: 99.4 FL (ref 82–102)
MONOCYTES # BLD: 1 K/UL (ref 0.1–1.3)
MONOCYTES # BLD: 1.2 K/UL (ref 0.1–1.3)
MONOCYTES NFR BLD: 5 % (ref 4–12)
MONOCYTES NFR BLD: 6 % (ref 4–12)
NEUTS SEG # BLD: 17.2 K/UL (ref 1.7–8.2)
NEUTS SEG # BLD: 19.3 K/UL (ref 1.7–8.2)
NEUTS SEG NFR BLD: 90 % (ref 43–78)
NEUTS SEG NFR BLD: 91 % (ref 43–78)
NITRITE UR QL STRIP.AUTO: NEGATIVE
NRBC # BLD: 0 K/UL (ref 0–0.2)
NRBC # BLD: 0 K/UL (ref 0–0.2)
PCO2 BLDV: 45.6 MMHG (ref 41–51)
PH BLDV: 7.32 (ref 7.32–7.42)
PH UR STRIP: 6.5 (ref 5–9)
PLATELET # BLD AUTO: 312 K/UL (ref 150–450)
PLATELET # BLD AUTO: 332 K/UL (ref 150–450)
PMV BLD AUTO: 10.4 FL (ref 9.4–12.3)
PMV BLD AUTO: 10.4 FL (ref 9.4–12.3)
PO2 BLDV: 64 MMHG
POTASSIUM SERPL-SCNC: 3.8 MMOL/L (ref 3.5–5.1)
PROT UR STRIP-MCNC: 300 MG/DL
RBC # BLD AUTO: 5.13 M/UL (ref 4.05–5.2)
RBC # BLD AUTO: 5.32 M/UL (ref 4.05–5.2)
RBC #/AREA URNS HPF: ABNORMAL /HPF
SAO2 % BLDV: 90 % (ref 65–88)
SERVICE CMNT-IMP: ABNORMAL
SODIUM SERPL-SCNC: 138 MMOL/L (ref 136–145)
SP GR UR REFRACTOMETRY: >1.035 (ref 1–1.02)
SPECIMEN EXP DATE BLD: NORMAL
SPECIMEN TYPE: ABNORMAL
UROBILINOGEN UR QL STRIP.AUTO: 1 EU/DL (ref 0.2–1)
WBC # BLD AUTO: 19.1 K/UL (ref 4.3–11.1)
WBC # BLD AUTO: 21.2 K/UL (ref 4.3–11.1)
WBC URNS QL MICRO: ABNORMAL /HPF

## 2024-10-04 PROCEDURE — 2580000003 HC RX 258: Performed by: STUDENT IN AN ORGANIZED HEALTH CARE EDUCATION/TRAINING PROGRAM

## 2024-10-04 PROCEDURE — 88307 TISSUE EXAM BY PATHOLOGIST: CPT

## 2024-10-04 PROCEDURE — 3600000009 HC SURGERY ROBOT BASE: Performed by: SURGERY

## 2024-10-04 PROCEDURE — 85025 COMPLETE CBC W/AUTO DIFF WBC: CPT

## 2024-10-04 PROCEDURE — 2000000000 HC ICU R&B

## 2024-10-04 PROCEDURE — 0WJG4ZZ INSPECTION OF PERITONEAL CAVITY, PERCUTANEOUS ENDOSCOPIC APPROACH: ICD-10-PCS | Performed by: SURGERY

## 2024-10-04 PROCEDURE — 99223 1ST HOSP IP/OBS HIGH 75: CPT | Performed by: INTERNAL MEDICINE

## 2024-10-04 PROCEDURE — 0DB80ZZ EXCISION OF SMALL INTESTINE, OPEN APPROACH: ICD-10-PCS | Performed by: SURGERY

## 2024-10-04 PROCEDURE — 6360000002 HC RX W HCPCS: Performed by: STUDENT IN AN ORGANIZED HEALTH CARE EDUCATION/TRAINING PROGRAM

## 2024-10-04 PROCEDURE — 5A1945Z RESPIRATORY VENTILATION, 24-96 CONSECUTIVE HOURS: ICD-10-PCS | Performed by: SURGERY

## 2024-10-04 PROCEDURE — 2709999900 HC NON-CHARGEABLE SUPPLY: Performed by: SURGERY

## 2024-10-04 PROCEDURE — 2580000003 HC RX 258: Performed by: FAMILY MEDICINE

## 2024-10-04 PROCEDURE — P9041 ALBUMIN (HUMAN),5%, 50ML: HCPCS

## 2024-10-04 PROCEDURE — 81001 URINALYSIS AUTO W/SCOPE: CPT

## 2024-10-04 PROCEDURE — 2580000003 HC RX 258: Performed by: ANESTHESIOLOGY

## 2024-10-04 PROCEDURE — 86900 BLOOD TYPING SEROLOGIC ABO: CPT

## 2024-10-04 PROCEDURE — 3700000000 HC ANESTHESIA ATTENDED CARE: Performed by: SURGERY

## 2024-10-04 PROCEDURE — 6360000002 HC RX W HCPCS

## 2024-10-04 PROCEDURE — 82803 BLOOD GASES ANY COMBINATION: CPT

## 2024-10-04 PROCEDURE — 94002 VENT MGMT INPAT INIT DAY: CPT

## 2024-10-04 PROCEDURE — 74177 CT ABD & PELVIS W/CONTRAST: CPT

## 2024-10-04 PROCEDURE — S2900 ROBOTIC SURGICAL SYSTEM: HCPCS | Performed by: SURGERY

## 2024-10-04 PROCEDURE — 2720000010 HC SURG SUPPLY STERILE: Performed by: SURGERY

## 2024-10-04 PROCEDURE — 2580000003 HC RX 258

## 2024-10-04 PROCEDURE — 71045 X-RAY EXAM CHEST 1 VIEW: CPT

## 2024-10-04 PROCEDURE — 2500000003 HC RX 250 WO HCPCS

## 2024-10-04 PROCEDURE — 86901 BLOOD TYPING SEROLOGIC RH(D): CPT

## 2024-10-04 PROCEDURE — 6360000002 HC RX W HCPCS: Performed by: INTERNAL MEDICINE

## 2024-10-04 PROCEDURE — 86850 RBC ANTIBODY SCREEN: CPT

## 2024-10-04 PROCEDURE — 3700000001 HC ADD 15 MINUTES (ANESTHESIA): Performed by: SURGERY

## 2024-10-04 PROCEDURE — 80048 BASIC METABOLIC PNL TOTAL CA: CPT

## 2024-10-04 PROCEDURE — 2580000003 HC RX 258: Performed by: INTERNAL MEDICINE

## 2024-10-04 PROCEDURE — 3600000019 HC SURGERY ROBOT ADDTL 15MIN: Performed by: SURGERY

## 2024-10-04 PROCEDURE — 82962 GLUCOSE BLOOD TEST: CPT

## 2024-10-04 RX ORDER — NOREPINEPHRINE BITARTRATE 0.02 MG/ML
1-100 INJECTION, SOLUTION INTRAVENOUS CONTINUOUS
Status: DISCONTINUED | OUTPATIENT
Start: 2024-10-04 | End: 2024-10-08

## 2024-10-04 RX ORDER — ATORVASTATIN CALCIUM 40 MG/1
40 TABLET, FILM COATED ORAL DAILY
Status: DISCONTINUED | OUTPATIENT
Start: 2024-10-04 | End: 2024-10-09

## 2024-10-04 RX ORDER — INSULIN LISPRO 100 [IU]/ML
0-4 INJECTION, SOLUTION INTRAVENOUS; SUBCUTANEOUS
Status: DISCONTINUED | OUTPATIENT
Start: 2024-10-04 | End: 2024-10-08

## 2024-10-04 RX ORDER — NOREPINEPHRINE BITARTRATE 1 MG/ML
INJECTION, SOLUTION INTRAVENOUS
Status: DISCONTINUED | OUTPATIENT
Start: 2024-10-04 | End: 2024-10-04 | Stop reason: SDUPTHER

## 2024-10-04 RX ORDER — FENTANYL CITRATE-0.9 % NACL/PF 10 MCG/ML
25-200 PLASTIC BAG, INJECTION (ML) INTRAVENOUS CONTINUOUS
Status: DISCONTINUED | OUTPATIENT
Start: 2024-10-04 | End: 2024-10-04 | Stop reason: SDUPTHER

## 2024-10-04 RX ORDER — LEVOTHYROXINE SODIUM 50 UG/1
50 TABLET ORAL
Status: DISCONTINUED | OUTPATIENT
Start: 2024-10-05 | End: 2024-10-11 | Stop reason: HOSPADM

## 2024-10-04 RX ORDER — MORPHINE SULFATE 4 MG/ML
INJECTION, SOLUTION INTRAMUSCULAR; INTRAVENOUS
Status: DISPENSED
Start: 2024-10-04 | End: 2024-10-04

## 2024-10-04 RX ORDER — MIDAZOLAM HYDROCHLORIDE 1 MG/ML
INJECTION INTRAMUSCULAR; INTRAVENOUS
Status: DISCONTINUED | OUTPATIENT
Start: 2024-10-04 | End: 2024-10-04 | Stop reason: SDUPTHER

## 2024-10-04 RX ORDER — LINEZOLID 2 MG/ML
600 INJECTION, SOLUTION INTRAVENOUS 2 TIMES DAILY
Status: DISCONTINUED | OUTPATIENT
Start: 2024-10-04 | End: 2024-10-04

## 2024-10-04 RX ORDER — HYDROCHLOROTHIAZIDE 25 MG/1
12.5 TABLET ORAL DAILY
Status: DISCONTINUED | OUTPATIENT
Start: 2024-10-04 | End: 2024-10-10

## 2024-10-04 RX ORDER — LOSARTAN POTASSIUM AND HYDROCHLOROTHIAZIDE 12.5; 5 MG/1; MG/1
1 TABLET ORAL DAILY
Status: DISCONTINUED | OUTPATIENT
Start: 2024-10-04 | End: 2024-10-04 | Stop reason: SDUPTHER

## 2024-10-04 RX ORDER — SERTRALINE HYDROCHLORIDE 100 MG/1
100 TABLET, FILM COATED ORAL DAILY
Status: DISCONTINUED | OUTPATIENT
Start: 2024-10-04 | End: 2024-10-11 | Stop reason: HOSPADM

## 2024-10-04 RX ORDER — SODIUM CHLORIDE, SODIUM LACTATE, POTASSIUM CHLORIDE, CALCIUM CHLORIDE 600; 310; 30; 20 MG/100ML; MG/100ML; MG/100ML; MG/100ML
INJECTION, SOLUTION INTRAVENOUS CONTINUOUS
Status: DISCONTINUED | OUTPATIENT
Start: 2024-10-04 | End: 2024-10-04 | Stop reason: HOSPADM

## 2024-10-04 RX ORDER — IOPAMIDOL 755 MG/ML
100 INJECTION, SOLUTION INTRAVASCULAR
Status: COMPLETED | OUTPATIENT
Start: 2024-10-04 | End: 2024-10-03

## 2024-10-04 RX ORDER — DEXAMETHASONE SODIUM PHOSPHATE 10 MG/ML
INJECTION INTRAMUSCULAR; INTRAVENOUS
Status: DISCONTINUED | OUTPATIENT
Start: 2024-10-04 | End: 2024-10-04 | Stop reason: SDUPTHER

## 2024-10-04 RX ORDER — PANTOPRAZOLE SODIUM 40 MG/1
40 TABLET, DELAYED RELEASE ORAL
Status: DISCONTINUED | OUTPATIENT
Start: 2024-10-05 | End: 2024-10-05

## 2024-10-04 RX ORDER — ONDANSETRON 2 MG/ML
INJECTION INTRAMUSCULAR; INTRAVENOUS
Status: DISCONTINUED | OUTPATIENT
Start: 2024-10-04 | End: 2024-10-04 | Stop reason: SDUPTHER

## 2024-10-04 RX ORDER — LINEZOLID 2 MG/ML
600 INJECTION, SOLUTION INTRAVENOUS EVERY 12 HOURS
Status: DISCONTINUED | OUTPATIENT
Start: 2024-10-05 | End: 2024-10-07

## 2024-10-04 RX ORDER — LOSARTAN POTASSIUM 50 MG/1
50 TABLET ORAL DAILY
Status: DISCONTINUED | OUTPATIENT
Start: 2024-10-04 | End: 2024-10-10

## 2024-10-04 RX ORDER — FERROUS SULFATE 325(65) MG
325 TABLET ORAL
Status: DISCONTINUED | OUTPATIENT
Start: 2024-10-05 | End: 2024-10-11 | Stop reason: HOSPADM

## 2024-10-04 RX ORDER — ALBUMIN, HUMAN INJ 5% 5 %
SOLUTION INTRAVENOUS
Status: DISCONTINUED | OUTPATIENT
Start: 2024-10-04 | End: 2024-10-04 | Stop reason: SDUPTHER

## 2024-10-04 RX ORDER — INSULIN LISPRO 100 [IU]/ML
0-4 INJECTION, SOLUTION INTRAVENOUS; SUBCUTANEOUS NIGHTLY
Status: DISCONTINUED | OUTPATIENT
Start: 2024-10-04 | End: 2024-10-08

## 2024-10-04 RX ORDER — MIDAZOLAM HYDROCHLORIDE 2 MG/2ML
1 INJECTION, SOLUTION INTRAMUSCULAR; INTRAVENOUS
Status: DISCONTINUED | OUTPATIENT
Start: 2024-10-04 | End: 2024-10-08

## 2024-10-04 RX ORDER — ROCURONIUM BROMIDE 10 MG/ML
INJECTION, SOLUTION INTRAVENOUS
Status: DISCONTINUED | OUTPATIENT
Start: 2024-10-04 | End: 2024-10-04 | Stop reason: SDUPTHER

## 2024-10-04 RX ORDER — FENTANYL CITRATE-0.9 % NACL/PF 20 MCG/2ML
50 SYRINGE (ML) INTRAVENOUS EVERY 30 MIN PRN
Status: DISCONTINUED | OUTPATIENT
Start: 2024-10-04 | End: 2024-10-07

## 2024-10-04 RX ORDER — SUCCINYLCHOLINE CHLORIDE 20 MG/ML
INJECTION INTRAMUSCULAR; INTRAVENOUS
Status: DISCONTINUED | OUTPATIENT
Start: 2024-10-04 | End: 2024-10-04 | Stop reason: SDUPTHER

## 2024-10-04 RX ORDER — SODIUM CHLORIDE, SODIUM LACTATE, POTASSIUM CHLORIDE, CALCIUM CHLORIDE 600; 310; 30; 20 MG/100ML; MG/100ML; MG/100ML; MG/100ML
INJECTION, SOLUTION INTRAVENOUS CONTINUOUS
Status: DISCONTINUED | OUTPATIENT
Start: 2024-10-04 | End: 2024-10-04

## 2024-10-04 RX ORDER — FENTANYL CITRATE 50 UG/ML
INJECTION, SOLUTION INTRAMUSCULAR; INTRAVENOUS
Status: DISCONTINUED | OUTPATIENT
Start: 2024-10-04 | End: 2024-10-04 | Stop reason: SDUPTHER

## 2024-10-04 RX ORDER — PROPOFOL 10 MG/ML
INJECTION, EMULSION INTRAVENOUS
Status: DISCONTINUED | OUTPATIENT
Start: 2024-10-04 | End: 2024-10-04 | Stop reason: SDUPTHER

## 2024-10-04 RX ORDER — FENTANYL CITRATE-0.9 % NACL/PF 10 MCG/ML
25-200 PLASTIC BAG, INJECTION (ML) INTRAVENOUS CONTINUOUS
Status: DISCONTINUED | OUTPATIENT
Start: 2024-10-04 | End: 2024-10-07

## 2024-10-04 RX ORDER — MIDAZOLAM HYDROCHLORIDE 2 MG/2ML
2 INJECTION, SOLUTION INTRAMUSCULAR; INTRAVENOUS
Status: DISCONTINUED | OUTPATIENT
Start: 2024-10-04 | End: 2024-10-08

## 2024-10-04 RX ORDER — LIDOCAINE HYDROCHLORIDE 20 MG/ML
INJECTION, SOLUTION EPIDURAL; INFILTRATION; INTRACAUDAL; PERINEURAL
Status: DISCONTINUED | OUTPATIENT
Start: 2024-10-04 | End: 2024-10-04 | Stop reason: SDUPTHER

## 2024-10-04 RX ADMIN — NOREPINEPHRINE BITARTRATE 16 MCG: 1 SOLUTION INTRAVENOUS at 10:41

## 2024-10-04 RX ADMIN — PIPERACILLIN AND TAZOBACTAM 4500 MG: 4; .5 INJECTION, POWDER, FOR SOLUTION INTRAVENOUS at 11:15

## 2024-10-04 RX ADMIN — PHENYLEPHRINE HYDROCHLORIDE 200 MCG: 10 INJECTION INTRAVENOUS at 09:57

## 2024-10-04 RX ADMIN — SODIUM CHLORIDE, PRESERVATIVE FREE 10 ML: 5 INJECTION INTRAVENOUS at 20:49

## 2024-10-04 RX ADMIN — LINEZOLID 600 MG: 600 INJECTION, SOLUTION INTRAVENOUS at 16:53

## 2024-10-04 RX ADMIN — SODIUM CHLORIDE, SODIUM LACTATE, POTASSIUM CHLORIDE, AND CALCIUM CHLORIDE: 600; 310; 30; 20 INJECTION, SOLUTION INTRAVENOUS at 09:56

## 2024-10-04 RX ADMIN — SODIUM CHLORIDE, SODIUM LACTATE, POTASSIUM CHLORIDE, AND CALCIUM CHLORIDE: 600; 310; 30; 20 INJECTION, SOLUTION INTRAVENOUS at 23:00

## 2024-10-04 RX ADMIN — SODIUM CHLORIDE, SODIUM LACTATE, POTASSIUM CHLORIDE, AND CALCIUM CHLORIDE: 600; 310; 30; 20 INJECTION, SOLUTION INTRAVENOUS at 09:29

## 2024-10-04 RX ADMIN — ONDANSETRON 4 MG: 2 INJECTION INTRAMUSCULAR; INTRAVENOUS at 09:56

## 2024-10-04 RX ADMIN — PHENYLEPHRINE HYDROCHLORIDE 100 MCG: 10 INJECTION INTRAVENOUS at 09:56

## 2024-10-04 RX ADMIN — NOREPINEPHRINE BITARTRATE 16 MCG: 1 SOLUTION INTRAVENOUS at 10:02

## 2024-10-04 RX ADMIN — ALBUMIN (HUMAN) 25 G: 12.5 SOLUTION INTRAVENOUS at 09:53

## 2024-10-04 RX ADMIN — MIDAZOLAM 2 MG: 1 INJECTION INTRAMUSCULAR; INTRAVENOUS at 16:31

## 2024-10-04 RX ADMIN — PROPOFOL 20 MG: 10 INJECTION, EMULSION INTRAVENOUS at 09:52

## 2024-10-04 RX ADMIN — LIDOCAINE HYDROCHLORIDE 60 MG: 20 INJECTION, SOLUTION EPIDURAL; INFILTRATION; INTRACAUDAL; PERINEURAL at 09:51

## 2024-10-04 RX ADMIN — ROCURONIUM BROMIDE 30 MG: 10 INJECTION, SOLUTION INTRAVENOUS at 10:18

## 2024-10-04 RX ADMIN — SODIUM CHLORIDE, SODIUM LACTATE, POTASSIUM CHLORIDE, AND CALCIUM CHLORIDE: 600; 310; 30; 20 INJECTION, SOLUTION INTRAVENOUS at 10:32

## 2024-10-04 RX ADMIN — DEXAMETHASONE SODIUM PHOSPHATE 10 MG: 10 INJECTION INTRAMUSCULAR; INTRAVENOUS at 09:56

## 2024-10-04 RX ADMIN — FENTANYL CITRATE 100 MCG: 50 INJECTION, SOLUTION INTRAMUSCULAR; INTRAVENOUS at 09:53

## 2024-10-04 RX ADMIN — MIDAZOLAM 1 MG: 1 INJECTION INTRAMUSCULAR; INTRAVENOUS at 23:53

## 2024-10-04 RX ADMIN — FENTANYL CITRATE 100 MCG/HR: 0.05 INJECTION, SOLUTION INTRAMUSCULAR; INTRAVENOUS at 23:02

## 2024-10-04 RX ADMIN — PIPERACILLIN AND TAZOBACTAM 3375 MG: 3; .375 INJECTION, POWDER, LYOPHILIZED, FOR SOLUTION INTRAVENOUS at 20:33

## 2024-10-04 RX ADMIN — PROPOFOL 80 MG: 10 INJECTION, EMULSION INTRAVENOUS at 09:51

## 2024-10-04 RX ADMIN — Medication 140 MG: at 09:52

## 2024-10-04 RX ADMIN — PHENYLEPHRINE HYDROCHLORIDE 100 MCG: 10 INJECTION INTRAVENOUS at 09:58

## 2024-10-04 RX ADMIN — MIDAZOLAM 2 MG: 1 INJECTION INTRAMUSCULAR; INTRAVENOUS at 10:13

## 2024-10-04 RX ADMIN — NOREPINEPHRINE BITARTRATE 16 MCG: 1 SOLUTION INTRAVENOUS at 10:06

## 2024-10-04 RX ADMIN — FENTANYL CITRATE 50 MCG/HR: 0.05 INJECTION, SOLUTION INTRAMUSCULAR; INTRAVENOUS at 12:47

## 2024-10-04 RX ADMIN — SODIUM CHLORIDE: 9 INJECTION, SOLUTION INTRAVENOUS at 20:29

## 2024-10-04 ASSESSMENT — PULMONARY FUNCTION TESTS
PIF_VALUE: 15
PIF_VALUE: 22
PIF_VALUE: 18
PIF_VALUE: 18

## 2024-10-04 ASSESSMENT — PAIN SCALES - GENERAL: PAINLEVEL_OUTOF10: 0

## 2024-10-04 NOTE — ED PROVIDER NOTES
Emergency Department Provider Note       PCP: Javier Avilez, APRN - NP   Age: 73 y.o.   Sex: female     Disposition; admit.  Condition is stable.    Diagnosis includes small bowel obstruction, abdominal pain with nausea and vomiting, leukocytosis    Medical Decision Making     Differential diagnosis includes intra-abdominal infection, ileus versus obstruction, volvulus    Lab work shows leukocytosis at 19.07, H&H of 16.6 and 48.5 indicating hemoconcentration.  Lab work did show hyperkalemia at 7.6 although there is hemolysis present.  POC lab work to confirm hyperkalemia is pending.  Will also obtain EKG to look for hyperkalemic changes.    CT imaging shows small bowel obstruction with large hiatal hernia.  Lactic acid is still pending although symptoms are controlled with morphine and Zofran.  Otherwise hemodynamically stable.  Does not need NG tube at this time but will consider if she starts vomiting again.    Spoke with general surgery, request hospitalist to admit and will see in consult.  Patient was made n.p.o. at this time.  Does not need NG tube.    Patient noted to be tachycardic, EKG shows diffuse ST depressions, heart rate of 131 with sinus tachycardia.  Will give another liter of LR for likely hypovolemia       History     73-year-old female with history of GERD, hypertension, thyroid disease that presents to ED with complaints of abdominal pain that has been burning and aching ever since this morning.  Has been getting progressively worse.  Reports nausea with multiple episodes of vomiting.  She is not sure when she has had her last bowel movement, does report constipation but no diarrhea.  No blood in her stool.  No urinary complaints.  She does report having bilious emesis that is green and yellow in color.  She has had history of abdominal hysterectomy, cholecystectomy, appendectomy.    The history is provided by the patient.     Physical Exam     Vitals signs and nursing note  conditions(799.89)     hyperlipidemia    Psychiatric disorder     depression    Thyroid disease      hypo-med        Past Surgical History:   Procedure Laterality Date    APPENDECTOMY  1972    removed with Tubal    BREAST REDUCTION SURGERY Bilateral 7/7/2020    BILATERAL BREAST REDUCTION performed by Wu Caleor MD at [facility]    CHOLECYSTECTOMY      [facility] CARDIAC SURG PROCEDURE UNLIST      Ablation 6/2008 for Arrhythmia    ERIK AND BSO      TOTAL KNEE ARTHROPLASTY Right 2010    TOTAL KNEE ARTHROPLASTY Left 2014    TUBAL LIGATION          Social History     Socioeconomic History    Marital status:    Tobacco Use    Smoking status: Never    Smokeless tobacco: Never   Substance and Sexual Activity    Alcohol use: No    Drug use: No     Social Determinants of Health     Financial Resource Strain: Low Risk  (9/10/2024)    Received from [employer]    Financial Resource Strain     Difficulty Paying Living Expenses: Not very hard     Difficulty Paying Medical Expenses: No   Food Insecurity: No Food Insecurity (9/10/2024)    Received from [employer]    Food Insecurity     Worried about Running Out of Food in the Last Year: Never true     Ran Out of Food in the Last Year: Never true   Transportation Needs: No Transportation Needs (9/10/2024)    Received from [employer]    Transportation Needs     Lack of Transportation: No   Physical Activity: Insufficiently Active (9/10/2024)    Received from [employer]    Physical Activity     Days of Exercise per Week: 1     Minutes of Exercise per Session: 20     Total Minutes of Exercise per Week: 20   Stress: No Stress Concern Present (9/10/2024)    Received from [employer]    Stress     Feeling of Stress : Only a little   Social Connections: Socially Integrated (9/10/2024)    Received from [employer]    Social Connections     Frequency of Communication with Friends and Family: More than three times a week     Frequency of Social Gatherings with Friends and

## 2024-10-04 NOTE — PROGRESS NOTES
Tech called RN to room 0804, elevated HR and low BP, tech took BP 4 times, 70s/40-50s and 60s/50s, patient clammy, complaining of pain, abd tender, charge RN to bedside, Dr. Odonnell at bedside, fluids running wide open, patient in trendelenburg, ICU rover to bedside, BP responding to fluids, Gen surg NP to bedside, patient taken to OR for emergent surgery, family at bedside and updated by treatment team, report called to pre-op.

## 2024-10-04 NOTE — ANESTHESIA PROCEDURE NOTES
Central Venous Line:    A central venous line was placed using ultrasound guidance, in the OR for the following indication(s): central venous access, CVP monitoring and vasoactive infusions.10/4/2024 11:00 AM10/4/2024 11:12 AM    Sterility preparation included the following: provider used sterile gloves, gown, hat and mask and maximum sterile barriers used during central venous catheter insertion.    The patient was placed in Trendelenburg position.The right internal jugular vein was prepped.    The site was prepped with Chloraprep.  A 9 Fr (size), 12 (length), introducer double lumen was placed.    During the procedure, the following specific steps were taken: target vein identified, needle advanced into vein and blood aspirated and guidewire advanced into vein.    Intravenous verification was obtained by ultrasound, venous blood return and manometry.    Post insertion care included: all ports aspirated, all ports flushed easily, guidewire removed intact, line sutured in place and dressing applied.    During the procedure the patient experienced: patient tolerated procedure well with no complications.      Outcomes: uncomplicated and patient tolerated procedure well  Anesthesia type: general..No    Additional notes:  Potential access site(s) were examined with ultrasound and acceptable patent access site was selected. The needle path and vein access were visualized with real time ultrasound guidance.  Vein accessed on first stick with real time ultrasound guidance.  Catheter was threaded into vessel and confirmed with presence of return of non pulsatile venous blood which retraced towards patient as tubing was held >25cm above patient.  Guidewire was advanced into vessel, visualized in vein on ultrasound and image saved to chart.  Easy catheter insertion.  Chest x-ray to be performed in ICU.  Seven step protocol followed.  Staffing  Performed: Anesthesiologist   Anesthesiologist: Edwin Martino MD  Performed

## 2024-10-04 NOTE — OP NOTE
Operative Note      Patient: Estefanía Hope  YOB: 1950  MRN: [id number]    Date of Procedure: 10/4/2024    Pre-Op Diagnosis Codes:      * Ischemic bowel syndrome (HCC) [K55.9]    Post-Op Diagnosis:  Gangrenous small bowel secondary to adhesive disease       Procedure(s):  EXPLORATORY LAPAROSCOPY CONVERTED TO OPEN. SMALL BOWEL RESECTION WITH ABTHERA WOUND DRESSING AND VAC APPLIED  LAPAROTOMY EXPLORATORY  \"Damage control surgery\"    Surgeon(s):  Jaiden Durham Jr., MD    Assistant:   Surgical Assistant: Doretha Escobedo    Anesthesia: General    Estimated Blood Loss (mL): less than 100     Complications: None    Specimens:   ID Type Source Tests Collected by Time Destination   A : PORTION OF SMALL BOWEL Tissue Abdomen SURGICAL PATHOLOGY Jaiden Durham Jr., MD 10/4/2024 1058        Implants:  * No implants in log *      Drains:   NG/OG/NJ/NE Tube Nasogastric Right nostril (Active)       Urinary Catheter 10/04/24 (Active)       Findings:  Infection Present At Time Of Surgery (PATOS) (choose all levels that have infection present):    Other Findings: Gangrenous small bowel secondary to a small diametered adhesive disease causing a closed-loop obstruction.  Approximately 20 cm of small bowel was extremely ischemic but not perforated.  Additional small bowel showed ischemia but also showed evidence of perfusion once obstruction released.      Detailed Description of Procedure:   Patient was taken to the operating room and after general anesthesia the areas prepped and draped in a sterile fashion.  Timeout was performed with all members of the team participating.  In the left upper quadrant at Veress needle was inserted at Mejia's point and uneventful pneumoperitoneum was created.  The abdomen was entered at the site using a 5 mm Optiview trocar.  General inspection was carried out and it was immediately determine that there was obviously gangrenous bowel present in the mid

## 2024-10-04 NOTE — PROGRESS NOTES
met family of patient in surgical waiting, per consult from hospital staff.  Large, supportive family was present, with others trying to come in from out of state.  Patient had been taken to emergent surgery.  Family expressed their anxieties, hopes and concerns, but stated they were at peace, knowing that patient is in God's hands.   provided pastoral presence, prayer and empathetic listening.   will continue to follow.    Peace be with you,  Signed by  SUSANA SteinerDiv.   [phone]

## 2024-10-04 NOTE — ACP (ADVANCE CARE PLANNING)
Advance Care Planning Note   Admit Date:  10/3/2024 10:59 PM   Name:  Estefanía Hope   Age:  73 y.o.  Sex:  female  :  1950   MRN:  [id number]   Room:  3109/    Estefanía Hope has capacity to make her own decisions:   Yes    If pt unable to make decisions, POA/surrogate decision maker:  Daughter    Other people present: Sister       Patient / surrogate decision-maker directed code status:  Full Code    Other ACP topics discussed, if applicable:   None    Patient or surrogate consented to discussion of the current conditions, workup, management plans, prognosis, and the risk for further deterioration.  Time spent:  17  minutes in direct discussion.      Signed:  Bela Odonnell MD

## 2024-10-04 NOTE — ANESTHESIA PROCEDURE NOTES
Airway  Date/Time: 10/4/2024 9:55 AM  Urgency: elective    Airway not difficult    General Information and Staff    Patient location during procedure: OR  Resident/CRNA: Azalea Tiwari APRN - CRNA  Performed: resident/CRNA   Performed by: Azalea Tiwari APRN - CRNA  Authorized by: Edwin Martino MD      Indications and Patient Condition  Indications for airway management: anesthesia  Spontaneous Ventilation: absent  Sedation level: deep  Preoxygenated: yes  Patient position: sniffing  MILS not maintained throughout  Mask difficulty assessment: vent by bag mask    Final Airway Details  Final airway type: endotracheal airway      Successful airway: ETT  Cuffed: yes   Successful intubation technique: direct laryngoscopy  Facilitating devices/methods: intubating stylet  Endotracheal tube insertion site: oral  Blade: Caesar  Blade size: #3  ETT size (mm): 7.0  Cormack-Lehane Classification: grade I - full view of glottis  Placement verified by: chest auscultation and capnometry   Measured from: lips  ETT to lips (cm): 22  Number of attempts at approach: 1  Ventilation between attempts: bag mask

## 2024-10-04 NOTE — PROGRESS NOTES
Blood draw done at 1236 was incorrectly entered to epic as venous blood; blood drawn was arterial.     Latest Reference Range & Units 10/04/24 12:36   PH, VENOUS (POC) 7.32 - 7.42   7.32   PCO2, Sarai, POC 41 - 51 MMHG 45.6   PO2, VENOUS (POC) mmHg 64   Bicarbonate, Venous 23 - 28 MMOL/L 23.3   SO2, VENOUS (POC) 65 - 88 % 90.0 (H)   Base Deficit, Venous mmol/L 3.0   (H): Data is abnormally high

## 2024-10-04 NOTE — ANESTHESIA PRE PROCEDURE
Department of Anesthesiology  Preprocedure Note       Name:  Estefanía Hope   Age:  73 y.o.  :  1950                                          MRN:  [id number]         Date:  10/4/2024      Surgeon: Surgeon(s):  Jaiden Durham Jr., MD    Procedure: Procedure(s):  BOWEL RESECTION LAPAROSCOPIC ROBOTIC  LAPAROTOMY EXPLORATORY    Medications prior to admission:   Prior to Admission medications    Medication Sig Start Date End Date Taking? Authorizing Provider   CALCIUM PO Take by mouth    Automatic Reconciliation, Ar   ascorbic acid (VITAMIN C) 500 MG tablet Take by mouth    Automatic Reconciliation, Ar   Coenzyme Q10 10 MG CAPS Take by mouth    Automatic Reconciliation, Ar   ferrous sulfate (IRON 325) 325 (65 Fe) MG tablet Take by mouth every morning (before breakfast)    Automatic Reconciliation, Ar   levothyroxine (SYNTHROID) 50 MCG tablet Take 50 mcg by mouth every morning (before breakfast)    Automatic Reconciliation, Ar   losartan-hydroCHLOROthiazide (HYZAAR) 50-12.5 MG per tablet Take 1 tablet by mouth    Automatic Reconciliation, Ar   omeprazole (PRILOSEC) 20 MG delayed release capsule Take 20 mg by mouth daily    Automatic Reconciliation, Ar   sertraline (ZOLOFT) 100 MG tablet Take 100 mg by mouth    Automatic Reconciliation, Ar   simvastatin (ZOCOR) 20 MG tablet Take 20 mg by mouth    Automatic Reconciliation, Ar       Current medications:    Current Facility-Administered Medications   Medication Dose Route Frequency Provider Last Rate Last Admin   • morphine 4 MG/ML injection            • norepinephrine (LEVOPHED) 4 mg in sodium chloride 0.9 % 250 mL infusion  1-100 mcg/min IntraVENous Continuous Bela Odonnell MD       • piperacillin-tazobactam (ZOSYN) 4,500 mg in sodium chloride 0.9 % 100 mL IVPB (mini-bag)  4,500 mg IntraVENous Once Bela Odonnell MD        Followed by   • piperacillin-tazobactam (ZOSYN) 3,375 mg in sodium chloride 0.9 % 50 mL IVPB (mini-bag)  3,375 mg IntraVENous Q8H

## 2024-10-04 NOTE — ASSESSMENT & PLAN NOTE
- No reported fevers, but WBC is 19 and lactic acid is also elevated  - Will obtain blood cultures  - Have ordered UA with reflex to culture  - Start IVFs.  Recheck CBC in AM  - Defer antibiotics for now.  If patient spikes fever, will start antibiotics for presumptive infection at that time

## 2024-10-04 NOTE — PROGRESS NOTES
completed follow up visit in ICU after procedure. Patient was intubated, but appeared alert and communicated with nodding and gestures. Patient affirmed prayer when offered.  provided pastoral presence, prayer and words of comfort.  Peace be with you,  Signed by  Larry Kent M.Div.   [phone]

## 2024-10-04 NOTE — PROGRESS NOTES
Ventilator check complete; patient has a #7.0 ET tube secured at the 21 at the lip.  Patient is not sedated.  Patient is  able to follow commands.  Breath sounds are clear.  Trachea is midline, Negative for subcutaneous air, and chest excursion is symmetric. Patient is also Negative for cyanosis and is Negative for pitting edema.  All alarms are set and audible.  Resuscitation bag is  at the head of the bed.      Ventilator Settings  Mode FIO2 Rate Tidal Volume Pressure PEEP I:E Ratio   SIMV/PRVC  50 %  14    400 10 cm H2O  8 1:4.9      Peak airway pressure:   17 cm H2O  Minute ventilation:   6 l/min        Kemar Lockwood RCP

## 2024-10-04 NOTE — CONSULTS
[facility] Cardiology Initial Cardiac Evaluation                Date of  Admission: 10/3/2024 10:59 PM     PCP: Javier Avilez, APRN - NP  Requested by: Dr Odonnell  Primary Cardiologist: None  APC Attending: Dr Nieto    Reason for Evaluation: Tachycardia      Estefanía Hope is a 73 y.o. female with hx of Anemia, arrhythmia with ablation 2008, asthma, chronic back pain, GERD, hyperlipidemia, hypertension, morbid obesity, depression, and hypothyroidism.  The patient was admitted on 10/3/2024 with complaints of abdominal pain with associated nausea and vomiting.  Initial ER evaluation showed CT with a small bowel obstruction and a large hiatal hernia.  Patient was noted to have elevated white blood cell count at 19.0 and elevated lactic acid with general surgery consulted and sent for admission.  Patient was noted to be septic with leukocytosis and started on IV fluids.  Patient developed tachycardia with sinus tach and hypotension as well.  Patient was fluid resuscitated and taken for emergency surgery.  Patient was noted to have ischemic bowel with small bowel resection with wound dressing and wound VAC applied before being transferred to ICU.  Pulmonary is on board for vent management and critical care setting.  Patient patient BP has improved on pressor support with HR up to 110.  No arrhythmia has been seen.    :     Past Medical History:   Diagnosis Date    Anemia     taking iron    Arrhythmia     had ablation-2008- no problems since    Arthritis     osteoarthritis knees, fingers    Asthma      no problems except in fall season-- no meds regularly    Chronic pain     back    Gastrointestinal disorder     GERD    GERD (gastroesophageal reflux disease)     controlled with medication    High cholesterol     on medication    Hypertension x4 yrs     controlled with med     Morbid obesity     bmi-42.2 on 5-20-14    Nausea & vomiting     not every time per pt    Other ill-defined conditions(799.89)     hyperlipidemia     morphine injection 1 mg  1 mg IntraVENous Q4H PRN    hydrALAZINE (APRESOLINE) injection 5 mg  5 mg IntraVENous Q6H PRN       Review of Systems   Unable to perform ROS: Intubated        Physical Exam  Vitals:    10/04/24 0930 10/04/24 1147 10/04/24 1215 10/04/24 1216   BP:  104/64 (!) 119/57    Pulse:  (!) 126 (!) 109 (!) 110   Resp:   18 18   Temp:  100 °F (37.8 °C)     TempSrc:  Temporal     SpO2:  100% 100% 100%   Weight: 83.9 kg (185 lb)      Height: 1.676 m (5' 6\")          Patient Vitals for the past 96 hrs (Last 3 readings):   Weight   10/04/24 0930 83.9 kg (185 lb)         Intake/Output Summary (Last 24 hours) at 10/4/2024 1330  Last data filed at 10/4/2024 1115  Gross per 24 hour   Intake 1600 ml   Output 250 ml   Net 1350 ml       Net IO Since Admission: 1,350 mL [10/04/24 1330]      Physical Exam:  General: Well Developed, Well Nourished, No Acute Distress  HEENT: pupils equal and round, no abnormalities noted  Neck: supple, no JVD, no carotid bruits  Heart: S1S2 with rapid and regular without murmurs or gallops  Lungs: clear intubated and equal lungs  Abd: Open surgery wound with wound vac in place  Ext: warm, no edema, calves supple/nontender, pulses 2+ bilaterally  Skin: warm and dry  Psychiatric: Normal mood and affect  Neurologic: Sedated and intubated      Recent Results (from the past 24 hour(s))   Basic Metabolic Panel w/ Reflex to MG    Collection Time: 10/04/24  5:55 AM   Result Value Ref Range    Sodium 138 136 - 145 mmol/L    Potassium 3.8 3.5 - 5.1 mmol/L    Chloride 101 98 - 107 mmol/L    CO2 18 (L) 20 - 28 mmol/L    Anion Gap 19 (H) 9 - 18 mmol/L    Glucose 220 (H) 70 - 99 mg/dL    BUN 28 (H) 8 - 23 MG/DL    Creatinine 1.32 (H) 0.60 - 1.10 MG/DL    Est, Glom Filt Rate 42 (L) >60 ml/min/1.73m2    Calcium 8.9 8.8 - 10.2 MG/DL   CBC with Auto Differential    Collection Time: 10/04/24  5:55 AM   Result Value Ref Range    WBC 21.2 (H) 4.3 - 11.1 K/uL    RBC 5.32 (H) 4.05 - 5.2 M/uL    Hemoglobin

## 2024-10-04 NOTE — CONSULTS
Consult Note  Estefanía Hope  MRN: [id number]  :1950  Age:73 y.o.    HPI: Estefanía Hope is a 73 y.o. female who is asked in consultation by Dr. Amaya (hospitalist) to see with small bowel obstruction with large hiatal hernia.    The patient has a PMHx of A-fib (s/p ablation ), anemia (takes iron), arthritis, asthma, chronic pain (back), GERD, HLD, HTN, morbid obesity, nausea and vomiting, depression, and hypothyroidism.   She presented 10/3/24 with acute onset (the day of 10/3/2024) of severe abdominal pain with associated nausea and bilious vomiting.  Abdominal pain is described as upper abdomen, constant, 10/10 at worst, aching, sore, aggravated by movement, alleviated by nothing.  Patient has never experienced abdominal pain similar.  She endorses history of constipation with approximately 3 bowel movements per week.  She uses Benefiber for constipation.  Prior to 10/3/2024, she was tolerating diet and having normal bowel function.  She denies chronic nausea; however nausea and vomiting is listed on her medical history.  She denied fevers, chills, hematemesis, coffee grounds emesis, melena, or hematochezia  In the ED 10/4/2024, afebrile.  Tachycardia LV=578n; otherwise VSS.  Lactic acid 3.4.  WBC 19.07.  CMP was hemolyzed  CT ABD/PELV 10/3/2024 revealed: Small bowel obstruction large hiatal hernia containing stomach portion of large bowel.    She was admitted by the hospitalist service on 10/3/2024 for small bowel obstruction.     NGT LIS was placed.  Patient is n.p.o. NGT/LIS at time of consultation.  Patient is not taking anticoagulation.  (Status post A-fib ablation ).  Previous abdominal surgeries: Umbilical hernia repair with mesh, hysterectomy, appendectomy with tubal ligation ()      Past Medical History:   Diagnosis Date    Anemia     taking iron    Arrhythmia     had ablation-- no problems since    Arthritis     osteoarthritis knees, fingers    Asthma      no problems

## 2024-10-04 NOTE — ICUWATCH
RRT Clinical Rounding Nurse Progress Report      SUBJECTIVE: Patient assessed secondary to RN/provider concern - hypotension and tachycardia.      Vitals:    10/04/24 0809 10/04/24 0812 10/04/24 0832 10/04/24 0905   BP: (!) 70/38 (!) 82/56 101/73 (!) 104/55   Pulse:    (!) 124   Resp:    20   Temp:    97.8 °F (36.6 °C)   TempSrc:    Tympanic   SpO2:    96%       ASSESSMENT:  Patient alert and oriented, but drowsy. Report severe abdominal pain. Abdomen distended and tender to light palpation. NGT in place to LIS with brown drainage in cannister. Patient with hypotension this morning as low as 71/28. S.Tach 120s. Skin cool with mottling of extremities. Dr. Odonnell at bedside. Fluid bolus ordered and initiated. BP responded well to fluid boluses. General surgery consulted GOPAL Bradley NP (Gen Surgery) at bedside and contacted surgeon. Patient to go to OR emergently. Assisted with transport to pre-op. Currently hemodynamically stable. Patient received ~1.5 liters of crystalloid fluid prior to going to pre-op.     PLAN:  Will follow per RRT Clinical Rounding Program protocol.    Oli Richardson RN  [facility]: [phone]  [facility]: [phone]

## 2024-10-04 NOTE — PROGRESS NOTES
0.00 0.0 - 0.2 K/uL    Differential Type AUTOMATED      Neutrophils % 91 (H) 43 - 78 %    Lymphocytes % 3 (L) 13 - 44 %    Monocytes % 5 4.0 - 12.0 %    Eosinophils % 0 (L) 0.5 - 7.8 %    Basophils % 0 0.0 - 2.0 %    Immature Granulocytes % 1 0.0 - 5.0 %    Neutrophils Absolute 19.3 (H) 1.7 - 8.2 K/UL    Lymphocytes Absolute 0.6 0.5 - 4.6 K/UL    Monocytes Absolute 1.2 0.1 - 1.3 K/UL    Eosinophils Absolute 0.0 0.0 - 0.8 K/UL    Basophils Absolute 0.0 0.0 - 0.2 K/UL    Immature Granulocytes Absolute 0.2 0.0 - 0.5 K/UL   TYPE AND SCREEN    Collection Time: 10/04/24  9:25 AM   Result Value Ref Range    Crossmatch expiration date 10/07/2024,2359     ABO/Rh B POSITIVE     Antibody Screen NEG    Venous Blood Gas, POC    Collection Time: 10/04/24 12:36 PM   Result Value Ref Range    PH, VENOUS (POC) 7.32 7.32 - 7.42      PCO2, Sarai, POC 45.6 41 - 51 MMHG    PO2, VENOUS (POC) 64 mmHg    HCO3, Venous 23.3 23 - 28 MMOL/L    SO2, VENOUS (POC) 90.0 (H) 65 - 88 %    Base Deficit, Venous 3.0 mmol/L    Specimen type: VENOUS BLOOD      Performed by: Ish     Respiratory Comment: Ve8.1        No results for input(s): \"COVID19\" in the last 72 hours.    Current Meds:  Current Facility-Administered Medications   Medication Dose Route Frequency    norepinephrine (LEVOPHED) 4 mg in sodium chloride 0.9 % 250 mL infusion  1-100 mcg/min IntraVENous Continuous    piperacillin-tazobactam (ZOSYN) 3,375 mg in sodium chloride 0.9 % 50 mL IVPB (mini-bag)  3,375 mg IntraVENous Q8H    insulin lispro (HUMALOG,ADMELOG) injection vial 0-4 Units  0-4 Units SubCUTAneous TID WC    insulin lispro (HUMALOG,ADMELOG) injection vial 0-4 Units  0-4 Units SubCUTAneous Nightly    midazolam PF (VERSED) injection 1 mg  1 mg IntraVENous Q1H PRN    midazolam PF (VERSED) injection 2 mg  2 mg IntraVENous Q1H PRN    fentaNYL (SUBLIMAZE) 1,000 mcg in sodium chloride 0.9% 100 mL infusion   mcg/hr IntraVENous Continuous    And    fentaNYL (SUBLIMAZE)  bolus from bag  50 mcg IntraVENous Q30 Min PRN    linezolid (ZYVOX) IVPB 600 mg  600 mg IntraVENous BID    sodium chloride flush 0.9 % injection 5-40 mL  5-40 mL IntraVENous 2 times per day    sodium chloride flush 0.9 % injection 5-40 mL  5-40 mL IntraVENous PRN    0.9 % sodium chloride infusion   IntraVENous PRN    potassium chloride (KLOR-CON M) extended release tablet 40 mEq  40 mEq Oral PRN    Or    potassium bicarb-citric acid (EFFER-K) effervescent tablet 40 mEq  40 mEq Oral PRN    Or    potassium chloride 10 mEq/100 mL IVPB (Peripheral Line)  10 mEq IntraVENous PRN    magnesium sulfate 2000 mg in 50 mL IVPB premix  2,000 mg IntraVENous PRN    enoxaparin (LOVENOX) injection 40 mg  40 mg SubCUTAneous Daily    ondansetron (ZOFRAN-ODT) disintegrating tablet 4 mg  4 mg Oral Q8H PRN    Or    ondansetron (ZOFRAN) injection 4 mg  4 mg IntraVENous Q6H PRN    polyethylene glycol (GLYCOLAX) packet 17 g  17 g Oral Daily PRN    acetaminophen (TYLENOL) tablet 650 mg  650 mg Oral Q6H PRN    Or    acetaminophen (TYLENOL) suppository 650 mg  650 mg Rectal Q6H PRN    lactated ringers IV soln infusion   IntraVENous Continuous    morphine injection 2 mg  2 mg IntraVENous Q4H PRN    morphine injection 1 mg  1 mg IntraVENous Q4H PRN    hydrALAZINE (APRESOLINE) injection 5 mg  5 mg IntraVENous Q6H PRN       Signed:  Bela Odonnell MD    Part of this note may have been written by using a voice dictation software.  The note has been proof read but may still contain some grammatical/other typographical errors.

## 2024-10-04 NOTE — ASSESSMENT & PLAN NOTE
- CT prelim read with evidence of SBO and large hiatal hernia  - NGT to LIS  - NPO except ice chips  - General Surgery consulted  - LR IVFs  - PRN morphine for pain

## 2024-10-04 NOTE — PROGRESS NOTES
Patient was intubated with a number 7.0 ET Tube. Tube placement verified by auscultation and by CXR. ET Tube is secured at the 21 cm fareed at the lip and on the right side. Patient was intubated by OR. Breath sounds are clear. Patient is negative for subcutaneous air and chest excursion is symmetric. Trachea is midline.  Patient is also negative for cyanosis and is negative for pitting edema.  Patient placed on ventilator on documented settings. All alarms are set and audible. Resuscitation bag with mask is at the head of the bed.      Ventilator Settings  Mode FIO2 Rate Tidal Volume  PEEP I:E Ratio     VC+  50%   18 bpm 400 mL      8 cm H20     1:2.7     Peak airway pressure:   22 cm H20  Minute ventilation:   7.6 L    Casa Chin RCP

## 2024-10-04 NOTE — ANESTHESIA POSTPROCEDURE EVALUATION
Department of Anesthesiology  Postprocedure Note    Patient: Estefanía Hope  MRN: [id number]  YOB: 1950  Date of evaluation: 10/4/2024    Procedure Summary       Date: 10/04/24 Room / Location:  MAIN OR 03 / [facility] MAIN OR    Anesthesia Start: 0933 Anesthesia Stop: 1148    Procedures:       EXPLORATORY LAPAROSCOPY CONVERTED TO OPEN. SMALL BOWEL RESECTION WITH ABTHERA WOUND DRESSING AND VAC APPLIED (Abdomen)      LAPAROTOMY EXPLORATORY (Abdomen) Diagnosis:       Ischemic bowel syndrome (HCC)      (Ischemic bowel syndrome (HCC) [K55.9])    Providers: Jaiden Durham Jr., MD Responsible Provider: Edwin Martino MD    Anesthesia Type: general ASA Status: 4 - Emergent            Anesthesia Type: No value filed.    Rupesh Phase I: Rupesh Score: 10    Rupesh Phase II:      Anesthesia Post Evaluation    Patient location during evaluation: ICU  Patient participation: complete - patient cannot participate  Level of consciousness: sedated and ventilated  Airway patency: patent  Nausea & Vomiting: no nausea and no vomiting  Cardiovascular status: hemodynamically stable  Respiratory status: acceptable and intubated  Hydration status: euvolemic  Comments: /64   Pulse (!) 126   Temp 100 °F (37.8 °C) (Temporal)   Resp 20   Ht 1.676 m (5' 6\")   Wt 83.9 kg (185 lb) Comment: By sister  SpO2 100%   BMI 29.86 kg/m²     Pt transported monitored to ICU. Full report given to ICU team at bedside. Pt was hemodynamically stable, well supported, and in no acute distress as Anesthesia team left bedside.    Multimodal analgesia pain management approach  Pain management: adequate    No notable events documented.

## 2024-10-04 NOTE — H&P
HISTORY AND PHYSICAL/Pulmonary - Consult  Estefanía Hope  10/4/2024   Date of Admission:  10/3/2024    The patient's chart is reviewed and the patient is discussed with the staff.    Subjective:     Information per chart.    Patient is a 73 y.o. female presents with need for Ventilatory Support after have surgery for ischemic bowel.    Per notes has HTN/Hypothyroidism and came in with abdominal pain with N/V. CT noted SBO with hiatal hernia. Patient also with WBC in 20's and taken to OR. Note to have Ischemic bowel with Small bowel resection with abthera wound dressing and vac applied.     Sent to ICU on Vent. Tentative plans are for the patient to return to the operating room for reexploration and definitive closure on Sunday.     Patient in ICU now and trying to open eyes on Vent. Just got ABG now and looked over lytes. CXR ordered to check ETT placement       Review of Systems:  Unable to obtain due to patient factors.     Current Outpatient Medications   Medication Instructions    ascorbic acid (VITAMIN C) 500 MG tablet Oral    CALCIUM PO Oral    Coenzyme Q10 10 MG CAPS Oral    ferrous sulfate (IRON 325) 325 (65 Fe) MG tablet Oral, DAILY BEFORE BREAKFAST    levothyroxine (SYNTHROID) 50 mcg, Oral, DAILY BEFORE BREAKFAST    losartan-hydroCHLOROthiazide (HYZAAR) 50-12.5 MG per tablet 1 tablet, Oral    omeprazole (PRILOSEC) 20 mg, Oral, DAILY    sertraline (ZOLOFT) 100 mg, Oral    simvastatin (ZOCOR) 20 mg, Oral      Past Medical History:   Diagnosis Date    Anemia     taking iron    Arrhythmia     had ablation-2008- no problems since    Arthritis     osteoarthritis knees, fingers    Asthma      no problems except in fall season-- no meds regularly    Chronic pain     back    Gastrointestinal disorder     GERD    GERD (gastroesophageal reflux disease)     controlled with medication    High cholesterol     on medication    Hypertension x4 yrs

## 2024-10-04 NOTE — PROGRESS NOTES
TRANSFER - IN REPORT:    Verbal report received from CRNA from OR on Estefanía Hope  being received from Surgery for routine progression of patient care      Report consisted of patient's Situation, Background, Assessment and   Recommendations(SBAR).     Information from the following report(s) Nurse Handoff Report, Index, Adult Overview, Surgery Report, Intake/Output, MAR, Cardiac Rhythm Normal Sinus, and Neuro Assessment was reviewed with the receiving nurse.    Opportunity for questions and clarification was provided.      Assessment completed upon patient's arrival to unit and care assumed.

## 2024-10-04 NOTE — ANESTHESIA PROCEDURE NOTES
Arterial Line:    An arterial line was placed using surface landmarks, in the OR for the following indication(s): continuous blood pressure monitoring and blood sampling needed.    A 20 gauge (size), 1 and 3/4 inch (length), Arrow (type) catheter was placed, Seldinger technique used, into the right radial artery, secured by Tegaderm and tape.    Events:  patient tolerated procedure well with no complications.10/4/2024 9:36 AM10/4/2024 9:41 AM  Anesthesiologist: Leo Dumont MD  Performed: Anesthesiologist   Preanesthetic Checklist  Completed: patient identified, IV checked, risks and benefits discussed, equipment checked, pre-op evaluation, timeout performed, anesthesia consent given, oxygen available and monitors applied/VS acknowledged

## 2024-10-05 ENCOUNTER — APPOINTMENT (OUTPATIENT)
Dept: GENERAL RADIOLOGY | Age: 74
End: 2024-10-05
Payer: MEDICARE

## 2024-10-05 ENCOUNTER — ANESTHESIA EVENT (OUTPATIENT)
Dept: SURGERY | Age: 74
End: 2024-10-05
Payer: MEDICARE

## 2024-10-05 LAB
ALBUMIN SERPL-MCNC: 2.2 G/DL (ref 3.2–4.6)
ALBUMIN SERPL-MCNC: 4 G/DL (ref 3.2–4.6)
ALBUMIN/GLOB SERPL: 0.8 (ref 1–1.9)
ALBUMIN/GLOB SERPL: 1 (ref 1–1.9)
ALP SERPL-CCNC: 36 U/L (ref 35–104)
ALP SERPL-CCNC: 48 U/L (ref 35–104)
ALT SERPL-CCNC: 11 U/L (ref 8–45)
ALT SERPL-CCNC: 17 U/L (ref 8–45)
ANION GAP SERPL CALC-SCNC: 12 MMOL/L (ref 9–18)
ANION GAP SERPL CALC-SCNC: 15 MMOL/L (ref 9–18)
ARTERIAL PATENCY WRIST A: POSITIVE
AST SERPL-CCNC: 22 U/L (ref 15–37)
AST SERPL-CCNC: 93 U/L (ref 15–37)
BASE EXCESS BLDV CALC-SCNC: 0.9 MMOL/L
BASOPHILS # BLD: 0 K/UL (ref 0–0.2)
BASOPHILS NFR BLD: 0 % (ref 0–2)
BDY SITE: NORMAL
BILIRUB SERPL-MCNC: 0.5 MG/DL (ref 0–1.2)
BILIRUB SERPL-MCNC: 0.7 MG/DL (ref 0–1.2)
BUN SERPL-MCNC: 19 MG/DL (ref 8–23)
BUN SERPL-MCNC: 32 MG/DL (ref 8–23)
CALCIUM SERPL-MCNC: 8.2 MG/DL (ref 8.8–10.2)
CALCIUM SERPL-MCNC: 9.6 MG/DL (ref 8.8–10.2)
CHLORIDE SERPL-SCNC: 105 MMOL/L (ref 98–107)
CHLORIDE SERPL-SCNC: 96 MMOL/L (ref 98–107)
CO2 SERPL-SCNC: 22 MMOL/L (ref 20–28)
CO2 SERPL-SCNC: 22 MMOL/L (ref 20–28)
CREAT SERPL-MCNC: 0.88 MG/DL (ref 0.6–1.1)
CREAT SERPL-MCNC: 1.11 MG/DL (ref 0.6–1.1)
DIFFERENTIAL METHOD BLD: ABNORMAL
EKG ATRIAL RATE: 105 BPM
EKG DIAGNOSIS: NORMAL
EKG P AXIS: 42 DEGREES
EKG P-R INTERVAL: 140 MS
EKG Q-T INTERVAL: 330 MS
EKG QRS DURATION: 88 MS
EKG QTC CALCULATION (BAZETT): 436 MS
EKG R AXIS: 49 DEGREES
EKG T AXIS: -50 DEGREES
EKG VENTRICULAR RATE: 105 BPM
EOSINOPHIL # BLD: 0 K/UL (ref 0–0.8)
EOSINOPHIL NFR BLD: 0 % (ref 0.5–7.8)
ERYTHROCYTE [DISTWIDTH] IN BLOOD BY AUTOMATED COUNT: 12.9 % (ref 11.9–14.6)
GAS FLOW.O2 O2 DELIVERY SYS: NORMAL
GLOBULIN SER CALC-MCNC: 2.6 G/DL (ref 2.3–3.5)
GLOBULIN SER CALC-MCNC: 4.2 G/DL (ref 2.3–3.5)
GLUCOSE BLD STRIP.AUTO-MCNC: 112 MG/DL (ref 65–100)
GLUCOSE BLD STRIP.AUTO-MCNC: 124 MG/DL (ref 65–100)
GLUCOSE BLD STRIP.AUTO-MCNC: 90 MG/DL (ref 65–100)
GLUCOSE SERPL-MCNC: 124 MG/DL (ref 70–99)
GLUCOSE SERPL-MCNC: 204 MG/DL (ref 70–99)
HCO3 BLDV-SCNC: 27 MMOL/L (ref 23–28)
HCT VFR BLD AUTO: 31.8 % (ref 35.8–46.3)
HGB BLD-MCNC: 10.1 G/DL (ref 11.7–15.4)
IMM GRANULOCYTES # BLD AUTO: 0.1 K/UL (ref 0–0.5)
IMM GRANULOCYTES NFR BLD AUTO: 1 % (ref 0–5)
IPAP/PIP/HIGH PEEP: 24
LACTATE SERPL-SCNC: 1.6 MMOL/L (ref 0.5–2)
LACTATE SERPL-SCNC: 3.3 MMOL/L (ref 0.5–2)
LIPASE SERPL-CCNC: 40 U/L (ref 13–60)
LYMPHOCYTES # BLD: 0.9 K/UL (ref 0.5–4.6)
LYMPHOCYTES NFR BLD: 6 % (ref 13–44)
MAGNESIUM SERPL-MCNC: 1.3 MG/DL (ref 1.8–2.4)
MAGNESIUM SERPL-MCNC: 1.4 MG/DL (ref 1.8–2.4)
MCH RBC QN AUTO: 32.1 PG (ref 26.1–32.9)
MCHC RBC AUTO-ENTMCNC: 31.8 G/DL (ref 31.4–35)
MCV RBC AUTO: 101 FL (ref 82–102)
MONOCYTES # BLD: 0.7 K/UL (ref 0.1–1.3)
MONOCYTES NFR BLD: 5 % (ref 4–12)
NEUTS SEG # BLD: 11.7 K/UL (ref 1.7–8.2)
NEUTS SEG NFR BLD: 87 % (ref 43–78)
NRBC # BLD: 0 K/UL (ref 0–0.2)
O2/TOTAL GAS SETTING VFR VENT: 50 %
PAW @ MEAN EXP FLOW ON VENT: 11 CMH2O
PCO2 BLDV: 49.1 MMHG (ref 41–51)
PEEP RESPIRATORY: 8 CMH2O
PH BLDV: 7.35 (ref 7.32–7.42)
PHOSPHATE SERPL-MCNC: 2.1 MG/DL (ref 2.5–4.5)
PHOSPHATE SERPL-MCNC: 2.5 MG/DL (ref 2.5–4.5)
PLATELET # BLD AUTO: 161 K/UL (ref 150–450)
PMV BLD AUTO: 10.2 FL (ref 9.4–12.3)
PO2 BLDV: 44 MMHG
POTASSIUM SERPL-SCNC: 4 MMOL/L (ref 3.5–5.1)
POTASSIUM SERPL-SCNC: ABNORMAL MMOL/L (ref 3.5–5.1)
PRESSURE SUPPORT SETTING VENT: 10 CMH2O
PROCALCITONIN SERPL-MCNC: 4.08 NG/ML (ref 0–0.1)
PROT SERPL-MCNC: 4.9 G/DL (ref 6.3–8.2)
PROT SERPL-MCNC: 8.2 G/DL (ref 6.3–8.2)
RBC # BLD AUTO: 3.15 M/UL (ref 4.05–5.2)
RESPIRATORY RATE, POC: 17 (ref 5–40)
SAO2 % BLDV: 77 % (ref 65–88)
SERVICE CMNT-IMP: ABNORMAL
SERVICE CMNT-IMP: ABNORMAL
SERVICE CMNT-IMP: NORMAL
SODIUM SERPL-SCNC: 133 MMOL/L (ref 136–145)
SODIUM SERPL-SCNC: 139 MMOL/L (ref 136–145)
SPECIMEN TYPE: NORMAL
VENTILATION MODE VENT: NORMAL
VT SETTING VENT: 400 ML
WBC # BLD AUTO: 13.5 K/UL (ref 4.3–11.1)

## 2024-10-05 PROCEDURE — 93005 ELECTROCARDIOGRAM TRACING: CPT | Performed by: INTERNAL MEDICINE

## 2024-10-05 PROCEDURE — 83605 ASSAY OF LACTIC ACID: CPT

## 2024-10-05 PROCEDURE — 94003 VENT MGMT INPAT SUBQ DAY: CPT

## 2024-10-05 PROCEDURE — 2580000003 HC RX 258: Performed by: EMERGENCY MEDICINE

## 2024-10-05 PROCEDURE — 85025 COMPLETE CBC W/AUTO DIFF WBC: CPT

## 2024-10-05 PROCEDURE — 84100 ASSAY OF PHOSPHORUS: CPT

## 2024-10-05 PROCEDURE — 2000000000 HC ICU R&B

## 2024-10-05 PROCEDURE — 6360000002 HC RX W HCPCS: Performed by: STUDENT IN AN ORGANIZED HEALTH CARE EDUCATION/TRAINING PROGRAM

## 2024-10-05 PROCEDURE — 6360000002 HC RX W HCPCS: Performed by: FAMILY MEDICINE

## 2024-10-05 PROCEDURE — 6360000002 HC RX W HCPCS: Performed by: INTERNAL MEDICINE

## 2024-10-05 PROCEDURE — 99291 CRITICAL CARE FIRST HOUR: CPT | Performed by: INTERNAL MEDICINE

## 2024-10-05 PROCEDURE — 87040 BLOOD CULTURE FOR BACTERIA: CPT

## 2024-10-05 PROCEDURE — 74018 RADEX ABDOMEN 1 VIEW: CPT

## 2024-10-05 PROCEDURE — 71045 X-RAY EXAM CHEST 1 VIEW: CPT

## 2024-10-05 PROCEDURE — 84145 PROCALCITONIN (PCT): CPT

## 2024-10-05 PROCEDURE — 02HV33Z INSERTION OF INFUSION DEVICE INTO SUPERIOR VENA CAVA, PERCUTANEOUS APPROACH: ICD-10-PCS | Performed by: ANESTHESIOLOGY

## 2024-10-05 PROCEDURE — 82803 BLOOD GASES ANY COMBINATION: CPT

## 2024-10-05 PROCEDURE — 82962 GLUCOSE BLOOD TEST: CPT

## 2024-10-05 PROCEDURE — 2580000003 HC RX 258: Performed by: FAMILY MEDICINE

## 2024-10-05 PROCEDURE — 93010 ELECTROCARDIOGRAM REPORT: CPT | Performed by: INTERNAL MEDICINE

## 2024-10-05 PROCEDURE — 80053 COMPREHEN METABOLIC PANEL: CPT

## 2024-10-05 PROCEDURE — 3E033XZ INTRODUCTION OF VASOPRESSOR INTO PERIPHERAL VEIN, PERCUTANEOUS APPROACH: ICD-10-PCS | Performed by: SURGERY

## 2024-10-05 PROCEDURE — C1751 CATH, INF, PER/CENT/MIDLINE: HCPCS

## 2024-10-05 PROCEDURE — 2580000003 HC RX 258: Performed by: INTERNAL MEDICINE

## 2024-10-05 PROCEDURE — 99221 1ST HOSP IP/OBS SF/LOW 40: CPT | Performed by: INTERNAL MEDICINE

## 2024-10-05 PROCEDURE — 36569 INSJ PICC 5 YR+ W/O IMAGING: CPT

## 2024-10-05 PROCEDURE — 2580000003 HC RX 258: Performed by: STUDENT IN AN ORGANIZED HEALTH CARE EDUCATION/TRAINING PROGRAM

## 2024-10-05 PROCEDURE — 83735 ASSAY OF MAGNESIUM: CPT

## 2024-10-05 PROCEDURE — 36415 COLL VENOUS BLD VENIPUNCTURE: CPT

## 2024-10-05 RX ORDER — SODIUM CHLORIDE, SODIUM LACTATE, POTASSIUM CHLORIDE, AND CALCIUM CHLORIDE .6; .31; .03; .02 G/100ML; G/100ML; G/100ML; G/100ML
500 INJECTION, SOLUTION INTRAVENOUS ONCE
Status: COMPLETED | OUTPATIENT
Start: 2024-10-05 | End: 2024-10-05

## 2024-10-05 RX ORDER — SODIUM CHLORIDE 0.9 % (FLUSH) 0.9 %
5-40 SYRINGE (ML) INJECTION EVERY 12 HOURS SCHEDULED
Status: DISCONTINUED | OUTPATIENT
Start: 2024-10-05 | End: 2024-10-06

## 2024-10-05 RX ORDER — SODIUM CHLORIDE 0.9 % (FLUSH) 0.9 %
5-40 SYRINGE (ML) INJECTION PRN
Status: DISCONTINUED | OUTPATIENT
Start: 2024-10-05 | End: 2024-10-11 | Stop reason: HOSPADM

## 2024-10-05 RX ORDER — SODIUM CHLORIDE 0.9 % (FLUSH) 0.9 %
5-40 SYRINGE (ML) INJECTION EVERY 12 HOURS SCHEDULED
Status: DISCONTINUED | OUTPATIENT
Start: 2024-10-05 | End: 2024-10-11 | Stop reason: HOSPADM

## 2024-10-05 RX ORDER — LIDOCAINE HYDROCHLORIDE 10 MG/ML
50 INJECTION, SOLUTION INFILTRATION; PERINEURAL ONCE
Status: DISCONTINUED | OUTPATIENT
Start: 2024-10-05 | End: 2024-10-11 | Stop reason: HOSPADM

## 2024-10-05 RX ORDER — MIDAZOLAM HYDROCHLORIDE 1 MG/ML
1-10 INJECTION, SOLUTION INTRAVENOUS CONTINUOUS
Status: DISCONTINUED | OUTPATIENT
Start: 2024-10-05 | End: 2024-10-07

## 2024-10-05 RX ORDER — SODIUM CHLORIDE 9 MG/ML
INJECTION, SOLUTION INTRAVENOUS PRN
Status: DISCONTINUED | OUTPATIENT
Start: 2024-10-05 | End: 2024-10-10

## 2024-10-05 RX ADMIN — LINEZOLID 600 MG: 600 INJECTION, SOLUTION INTRAVENOUS at 18:48

## 2024-10-05 RX ADMIN — FENTANYL CITRATE 150 MCG/HR: 0.05 INJECTION, SOLUTION INTRAMUSCULAR; INTRAVENOUS at 21:56

## 2024-10-05 RX ADMIN — PANTOPRAZOLE SODIUM 40 MG: 40 INJECTION, POWDER, FOR SOLUTION INTRAVENOUS at 08:34

## 2024-10-05 RX ADMIN — PIPERACILLIN AND TAZOBACTAM 3375 MG: 3; .375 INJECTION, POWDER, LYOPHILIZED, FOR SOLUTION INTRAVENOUS at 20:21

## 2024-10-05 RX ADMIN — MIDAZOLAM 2 MG/HR: 5 INJECTION INTRAMUSCULAR; INTRAVENOUS at 08:24

## 2024-10-05 RX ADMIN — MIDAZOLAM 2 MG: 1 INJECTION INTRAMUSCULAR; INTRAVENOUS at 03:27

## 2024-10-05 RX ADMIN — Medication 50 MCG: at 01:22

## 2024-10-05 RX ADMIN — Medication 50 MCG: at 03:27

## 2024-10-05 RX ADMIN — MIDAZOLAM 2 MG: 1 INJECTION INTRAMUSCULAR; INTRAVENOUS at 06:26

## 2024-10-05 RX ADMIN — FENTANYL CITRATE 100 MCG/HR: 0.05 INJECTION, SOLUTION INTRAMUSCULAR; INTRAVENOUS at 07:11

## 2024-10-05 RX ADMIN — LINEZOLID 600 MG: 600 INJECTION, SOLUTION INTRAVENOUS at 05:30

## 2024-10-05 RX ADMIN — PIPERACILLIN AND TAZOBACTAM 3375 MG: 3; .375 INJECTION, POWDER, LYOPHILIZED, FOR SOLUTION INTRAVENOUS at 14:19

## 2024-10-05 RX ADMIN — FENTANYL CITRATE 150 MCG/HR: 0.05 INJECTION, SOLUTION INTRAMUSCULAR; INTRAVENOUS at 14:16

## 2024-10-05 RX ADMIN — Medication 50 MCG: at 07:44

## 2024-10-05 RX ADMIN — SODIUM CHLORIDE, SODIUM LACTATE, POTASSIUM CHLORIDE, AND CALCIUM CHLORIDE: 600; 310; 30; 20 INJECTION, SOLUTION INTRAVENOUS at 19:48

## 2024-10-05 RX ADMIN — MIDAZOLAM 2 MG: 1 INJECTION INTRAMUSCULAR; INTRAVENOUS at 01:28

## 2024-10-05 RX ADMIN — SODIUM CHLORIDE, PRESERVATIVE FREE 10 ML: 5 INJECTION INTRAVENOUS at 19:53

## 2024-10-05 RX ADMIN — SODIUM CHLORIDE, POTASSIUM CHLORIDE, SODIUM LACTATE AND CALCIUM CHLORIDE 500 ML: 600; 310; 30; 20 INJECTION, SOLUTION INTRAVENOUS at 03:40

## 2024-10-05 RX ADMIN — MORPHINE SULFATE 2 MG: 2 INJECTION, SOLUTION INTRAMUSCULAR; INTRAVENOUS at 05:39

## 2024-10-05 RX ADMIN — SODIUM CHLORIDE, PRESERVATIVE FREE 10 ML: 5 INJECTION INTRAVENOUS at 08:34

## 2024-10-05 RX ADMIN — PIPERACILLIN AND TAZOBACTAM 3375 MG: 3; .375 INJECTION, POWDER, LYOPHILIZED, FOR SOLUTION INTRAVENOUS at 05:22

## 2024-10-05 ASSESSMENT — PULMONARY FUNCTION TESTS
PIF_VALUE: 22
PIF_VALUE: 23
PIF_VALUE: 23

## 2024-10-05 ASSESSMENT — PAIN DESCRIPTION - ORIENTATION: ORIENTATION: ANTERIOR;MID;UPPER

## 2024-10-05 ASSESSMENT — PAIN - FUNCTIONAL ASSESSMENT: PAIN_FUNCTIONAL_ASSESSMENT: ACTIVITIES ARE NOT PREVENTED

## 2024-10-05 ASSESSMENT — PAIN SCALES - GENERAL
PAINLEVEL_OUTOF10: 0
PAINLEVEL_OUTOF10: 0
PAINLEVEL_OUTOF10: 7
PAINLEVEL_OUTOF10: 0

## 2024-10-05 ASSESSMENT — PAIN DESCRIPTION - DESCRIPTORS: DESCRIPTORS: PATIENT UNABLE TO DESCRIBE

## 2024-10-05 ASSESSMENT — PAIN DESCRIPTION - LOCATION: LOCATION: ABDOMEN

## 2024-10-05 NOTE — PROGRESS NOTES
Bedside and verbal shift change report received from   RN (offgoing nurse). Report included the following information SBAR, Kardex, Intake/Output, MAR, Recent Results, Med Rec Status, Cardiac Rhythm SR, and Alarm Parameters .

## 2024-10-05 NOTE — ANESTHESIA PRE PROCEDURE
Department of Anesthesiology  Preprocedure Note       Name:  Estefanía Hope   Age:  73 y.o.  :  1950                                          MRN:  [id number]         Date:  10/5/2024      Surgeon: Surgeon(s):  Crow Montero MD    Procedure: Procedure(s):  EXPLORATORY LAPAROTOMY, REMOVAL OF ABTHERA WOUND DRESSING, BOWEL SMALL ANASTOMOSIS    Medications prior to admission:   Prior to Admission medications    Medication Sig Start Date End Date Taking? Authorizing Provider   CALCIUM PO Take by mouth   Yes Automatic Reconciliation, Ar   ascorbic acid (VITAMIN C) 500 MG tablet Take by mouth   Yes Automatic Reconciliation, Ar   Coenzyme Q10 10 MG CAPS Take by mouth   Yes Automatic Reconciliation, Ar   ferrous sulfate (IRON 325) 325 (65 Fe) MG tablet Take by mouth every morning (before breakfast)   Yes Automatic Reconciliation, Ar   levothyroxine (SYNTHROID) 50 MCG tablet Take 1 tablet by mouth every morning (before breakfast)   Yes Automatic Reconciliation, Ar   losartan-hydroCHLOROthiazide (HYZAAR) 50-12.5 MG per tablet Take 1 tablet by mouth   Yes Automatic Reconciliation, Ar   omeprazole (PRILOSEC) 20 MG delayed release capsule Take 1 capsule by mouth daily   Yes Automatic Reconciliation, Ar   sertraline (ZOLOFT) 100 MG tablet Take 1 tablet by mouth   Yes Automatic Reconciliation, Ar   simvastatin (ZOCOR) 20 MG tablet Take 1 tablet by mouth   Yes Automatic Reconciliation, Ar       Current medications:    Current Facility-Administered Medications   Medication Dose Route Frequency Provider Last Rate Last Admin    midazolam (VERSED) 100mg/100mL in NS infusion  1-10 mg/hr IntraVENous Continuous Avni Cadena MD 2 mL/hr at 10/05/24 0824 2 mg/hr at 10/05/24 0824    pantoprazole (PROTONIX) 40 mg in sodium chloride (PF) 0.9 % 10 mL injection  40 mg IntraVENous Daily Avni aCdena MD   40 mg at 10/05/24 0834    sodium chloride flush 0.9 % injection 5-40 mL  5-40 mL IntraVENous 2 times per day Tammi

## 2024-10-05 NOTE — PROGRESS NOTES
PICC Placement Note    PRE-PROCEDURE VERIFICATION    Correct Procedure: yes  Time out completed with assistant Breanne Forrester RN and all persons present in agreement with time out. Placed by Judy Calvillo RN, [facility]-BC  Risks and benefits reviewed with patient and informed consent obtained prior to assessment and procedure.     Correct Site: yes  Temperature: Temp: 98.9 °F (37.2 °C), Temperature Source:    Recent Labs     10/04/24  0555 10/05/24  0910   BUN 28* 32*     --    WBC 21.2*  --      Allergies: Levofloxacin  Education materials for PICC Care given to patient or family.    PROCEDURE DETAIL  A double lumen PICC line was started for vesicant medicines. The following documentation is in addition to the PICC properties in the lines/airways flowsheet:    Lot #: [id number]  Xylocaine used: Yes  Mid-Arm Circumference: 44(cm)  Internal Catheter Length: 40 (cm)  External Catheter Length: 0 (cm)  Total Catheter Length: 40 (cm)  Vein Selection for PICC: right basilic  Central Line Insertion Bundle followed: Yes  Complication Related to Insertion: none    Both the insertion guidewire and ECG guidewire were removed intact all ports have positive blood return and were flush well with normal saline.    The location of the tip of the PICC is verified using ECG technology.  The tip is in the SVC per ECG reading.  See image below.     Line is okay to use: yes              Breanne Hernandez RN, PCCN, [facility]-BC

## 2024-10-05 NOTE — PROGRESS NOTES
(130 lb 11.7 oz)  Adjusted ideal body weight: 69.1 kg (152 lb 7 oz)  Mode FIO2 Rate Tidal Volume Pressure   SIMV/PRVC    40 %  14 bpm         8     Peak airway pressure:     Minute ventilation:      PHYSICAL EXAM   Constitutional:  the patient is well developed and in no acute distress on Vent  EENMT:  Sclera clear, pupils equal, oral mucosa moist  Respiratory: symmetric chest rise. CTA b/l.  Cardiovascular:  RRR without M,G,R. There is no lower extremity edema.  Gastrointestinal: soft and non-tender; with positive bowel sounds.  Musculoskeletal: warm without cyanosis. Normal muscle tone.   Skin:  no jaundice or rashes, post op wounds with VAC  Neurologic: No focal deficits  Psychiatric: Following commands calm    Imaging: I performed an independent interpretation of the patient's images.  CXR:-ET tube noted, patient with hiatal hernia with?  Further increase in the right chest.        LAB:  Recent Labs     10/03/24  1952 10/04/24  0555   WBC 19.1* 21.2*   HGB 16.6* 17.0*   HCT 48.5* 52.9*    312     Recent Labs     10/04/24  0555      K 3.8      CO2 18*   BUN 28*   CREATININE 1.32*     No results for input(s): \"LACTATE\", \"PROCAL\", \"TROPHS\", \"NTPROBNP\", \"CRP\", \"ESR\" in the last 72 hours.  No results for input(s): \"PHAPOC\", \"BXP8ACLA\", \"FU8JGIR\", \"EQL3ILR\", \"BE\" in the last 72 hours.  Microbiology:   No results for input(s): \"CULTURE\" in the last 72 hours.  No results for input(s): \"COVID19\" in the last 72 hours.  Assessment and Plan:  (Medical Decision Making)    Impression: 73 y.o. female with HTN/hypothyroidism with SBO and now post op noted Ischemic bowel with some resection. Abdomen open with wound Vac on pressors/vent.  Alert today and complaining about pain.    NEURO:   Sedation: Add Versed drip, low-dose  Analgesia: Will give fentanyl push given pain and continue drip little higher rate     CV:   Volume Status: In shock, continue IV fluids but will go up to 150  Septic shock: With low-dose  pressors and will continue. Continue abx and fluids. Keep MAP > 60 mmHg.  Will get PICC line  PULM:   Encounter wean vent: Venous gas noted with normal pH and patient is doing well.  Will likely be going to back to the OR tomorrow and will keep sedated.   RENAL:  ELY: Making urine.  Will check creatinine    Lactic acidosis: check  Electrolytes: replace and monitor.   GI:   Nutrition: NPO. Clarify with surgery when they would like TPN possibly tomorrow  Ischemic Bowel/SBO/Hiatal hernia - per surgery.   HEME:   Polycythemia: Hg at 17 and monitor. May be elevated form dehydration.      ID:   Septic Shock: post surgery. On ABX and wound Vac. To go back to OR on Sunday.   ENDO:   F/u sugars:    Check TSH  Skin: no decub, turns, preventive care  Prophy: SCD/PPI    LABS pending today  Spoke with respiratory and nursing  No family here at this time - will call them shortly.    Condition is critical Time spent is 41 minutes.   Told hospitalist will take over care..     The patient is critically ill with respiratory failure, circulatory failure and requires high complexity decision making for assessment and support including frequent ventilator adjustment, frequent evaluation and titration of therapies , application of advanced monitoring technologies and extensive interpretation of multiple databases    Avni Cadena MD    Dictated using voice recognition software.  Proof read but unrecognized errors may exist.

## 2024-10-05 NOTE — PLAN OF CARE
Problem: Discharge Planning  Goal: Discharge to home or other facility with appropriate resources  Outcome: Progressing  Flowsheets (Taken 10/4/2024 1930)  Discharge to home or other facility with appropriate resources:   Identify barriers to discharge with patient and caregiver   Arrange for needed discharge resources and transportation as appropriate   Identify discharge learning needs (meds, wound care, etc)   Refer to discharge planning if patient needs post-hospital services based on physician order or complex needs related to functional status, cognitive ability or social support system     Problem: Safety - Adult  Goal: Free from fall injury  Outcome: Progressing     Problem: Skin/Tissue Integrity  Goal: Absence of new skin breakdown  Description: 1.  Monitor for areas of redness and/or skin breakdown  2.  Assess vascular access sites hourly  3.  Every 4-6 hours minimum:  Change oxygen saturation probe site  4.  Every 4-6 hours:  If on nasal continuous positive airway pressure, respiratory therapy assess nares and determine need for appliance change or resting period.  Outcome: Progressing

## 2024-10-05 NOTE — PLAN OF CARE
Problem: Discharge Planning  Goal: Discharge to home or other facility with appropriate resources  Outcome: Progressing  Flowsheets (Taken 10/5/2024 7552)  Discharge to home or other facility with appropriate resources: Identify barriers to discharge with patient and caregiver     Problem: Safety - Adult  Goal: Free from fall injury  Outcome: Progressing     Problem: Skin/Tissue Integrity  Goal: Absence of new skin breakdown  Description: 1.  Monitor for areas of redness and/or skin breakdown  2.  Assess vascular access sites hourly  3.  Every 4-6 hours minimum:  Change oxygen saturation probe site  4.  Every 4-6 hours:  If on nasal continuous positive airway pressure, respiratory therapy assess nares and determine need for appliance change or resting period.  Outcome: Progressing     Problem: Safety - Medical Restraint  Goal: Remains free of injury from restraints (Restraint for Interference with Medical Device)  Description: INTERVENTIONS:  1. Determine that other, less restrictive measures have been tried or would not be effective before applying the restraint  2. Evaluate the patient's condition at the time of restraint application  3. Inform patient/family regarding the reason for restraint  4. Q2H: Monitor safety, psychosocial status, comfort, nutrition and hydration  Outcome: Progressing

## 2024-10-05 NOTE — PROGRESS NOTES
Ventilator check complete; patient has a #7.0 ET tube secured at the 21 at the lip.  Patient is sedated.  Patient is not able to follow commands.  Breath sounds are clear.  Trachea is midline, Negative for subcutaneous air, and chest excursion is symmetric. Patient is also Negative for cyanosis and is Negative for pitting edema.  All alarms are set and audible.  Resuscitation bag is  at the head of the bed.      Ventilator Settings  Mode FIO2 Rate Tidal Volume Pressure PEEP I:E Ratio   SIMV/PRVC  30 %   14 400   8 1:3.8      Peak airway pressure:   22 cm H2O  Minute ventilation:   5.8 l/min          Kemar Lockwood RCP

## 2024-10-05 NOTE — PROGRESS NOTES
General Surgery Progress Note    10/5/2024    Admit Date: 10/3/2024    Subjective:   Surgery (picking patient up from Dr. Durham)  The patient is sedated on the ventilator in the ICU, bed 9. She is off pressors. Will plan for a second look surgery with small bowel anastamosis on 10/6/24. The small bowel is presently in discontinuity.     Objective:     /62   Pulse (!) 106   Temp 98.9 °F (37.2 °C) (Axillary)   Resp 14   Ht 1.676 m (5' 6\")   Wt 83.9 kg (185 lb) Comment: By sister  SpO2 100%   BMI 29.86 kg/m²       Intake/Output Summary (Last 24 hours) at 10/5/2024 1312  Last data filed at 10/5/2024 0544  Gross per 24 hour   Intake 1730.39 ml   Output 1675 ml   Net 55.39 ml        EXAM:  ABD soft, Ab-Thera in place, tenderness to palpation.        Data Review    Recent Results (from the past 24 hour(s))   POCT Glucose    Collection Time: 10/04/24  5:51 PM   Result Value Ref Range    POC Glucose 134 (H) 65 - 100 mg/dL    Performed by: Jean    POCT Glucose    Collection Time: 10/04/24  8:40 PM   Result Value Ref Range    POC Glucose 130 (H) 65 - 100 mg/dL    Performed by: Fartun    Venous Blood Gas, POC    Collection Time: 10/05/24  4:24 AM   Result Value Ref Range    DEVICE ADULT VENT      FIO2 50 %    PH, VENOUS (POC) 7.35 7.32 - 7.42      PCO2, Sarai, POC 49.1 41 - 51 MMHG    PO2, VENOUS (POC) 44 mmHg    HCO3, Venous 27.0 23 - 28 MMOL/L    SO2, VENOUS (POC) 77.0 65 - 88 %    BASE EXCESS, VENOUS (POC) 0.9 mmol/L    Mode Pressure regulated volume control      POC TIDAL VOLUME 400 ml    POC PEEP 8 cmH2O    MEAN AIRWAY PRESSURE 11 cmH2O    IPAP/PIP/High PEEP 24      POC Pressure Support 10 cmH2O    POC Sav's Test Positive      Respiratory Rate 17      Site RIGHT BRACHIAL      Specimen type: MIXED VENOUS      Performed by: Ab     Critical Value Read Back JESS     Respiratory Comment: ve8.37    POCT Glucose    Collection Time: 10/05/24  8:31 AM   Result Value Ref Range

## 2024-10-06 ENCOUNTER — APPOINTMENT (OUTPATIENT)
Dept: GENERAL RADIOLOGY | Age: 74
End: 2024-10-06
Payer: MEDICARE

## 2024-10-06 ENCOUNTER — ANESTHESIA (OUTPATIENT)
Dept: SURGERY | Age: 74
End: 2024-10-06
Payer: MEDICARE

## 2024-10-06 PROBLEM — R65.21 SEPTIC SHOCK (HCC): Status: ACTIVE | Noted: 2024-10-06

## 2024-10-06 PROBLEM — A41.9 SEPTIC SHOCK (HCC): Status: ACTIVE | Noted: 2024-10-06

## 2024-10-06 PROBLEM — K55.9 ISCHEMIC BOWEL DISEASE (HCC): Status: ACTIVE | Noted: 2024-10-06

## 2024-10-06 PROBLEM — Z99.11 ENCOUNTER FOR WEANING FROM VENTILATOR (HCC): Status: ACTIVE | Noted: 2024-10-06

## 2024-10-06 PROBLEM — K56.609 SBO (SMALL BOWEL OBSTRUCTION) (HCC): Status: ACTIVE | Noted: 2024-10-06

## 2024-10-06 PROBLEM — K55.9 ISCHEMIA, BOWEL (HCC): Status: ACTIVE | Noted: 2024-10-06

## 2024-10-06 LAB
ALBUMIN SERPL-MCNC: 2 G/DL (ref 3.2–4.6)
ALBUMIN/GLOB SERPL: 0.7 (ref 1–1.9)
ALP SERPL-CCNC: 36 U/L (ref 35–104)
ALT SERPL-CCNC: 10 U/L (ref 8–45)
ANION GAP SERPL CALC-SCNC: 6 MMOL/L (ref 9–18)
ARTERIAL PATENCY WRIST A: POSITIVE
AST SERPL-CCNC: 18 U/L (ref 15–37)
BASE EXCESS BLD CALC-SCNC: 3.3 MMOL/L
BASOPHILS # BLD: 0 K/UL (ref 0–0.2)
BASOPHILS NFR BLD: 0 % (ref 0–2)
BDY SITE: ABNORMAL
BILIRUB SERPL-MCNC: 0.5 MG/DL (ref 0–1.2)
BUN SERPL-MCNC: 25 MG/DL (ref 8–23)
CALCIUM SERPL-MCNC: 8.2 MG/DL (ref 8.8–10.2)
CHLORIDE SERPL-SCNC: 108 MMOL/L (ref 98–107)
CO2 SERPL-SCNC: 27 MMOL/L (ref 20–28)
CREAT SERPL-MCNC: 0.88 MG/DL (ref 0.6–1.1)
DIFFERENTIAL METHOD BLD: ABNORMAL
EOSINOPHIL # BLD: 0 K/UL (ref 0–0.8)
EOSINOPHIL NFR BLD: 0 % (ref 0.5–7.8)
ERYTHROCYTE [DISTWIDTH] IN BLOOD BY AUTOMATED COUNT: 12.7 % (ref 11.9–14.6)
GAS FLOW.O2 O2 DELIVERY SYS: ABNORMAL
GLOBULIN SER CALC-MCNC: 2.7 G/DL (ref 2.3–3.5)
GLUCOSE BLD STRIP.AUTO-MCNC: 113 MG/DL (ref 65–100)
GLUCOSE BLD STRIP.AUTO-MCNC: 131 MG/DL (ref 65–100)
GLUCOSE BLD STRIP.AUTO-MCNC: 72 MG/DL (ref 65–100)
GLUCOSE SERPL-MCNC: 107 MG/DL (ref 70–99)
HCO3 BLD-SCNC: 28.5 MMOL/L (ref 22–26)
HCT VFR BLD AUTO: 29.2 % (ref 35.8–46.3)
HGB BLD-MCNC: 9.1 G/DL (ref 11.7–15.4)
IMM GRANULOCYTES # BLD AUTO: 0 K/UL (ref 0–0.5)
IMM GRANULOCYTES NFR BLD AUTO: 0 % (ref 0–5)
IPAP/PIP/HIGH PEEP: 24
LYMPHOCYTES # BLD: 0.8 K/UL (ref 0.5–4.6)
LYMPHOCYTES NFR BLD: 9 % (ref 13–44)
MCH RBC QN AUTO: 32 PG (ref 26.1–32.9)
MCHC RBC AUTO-ENTMCNC: 31.2 G/DL (ref 31.4–35)
MCV RBC AUTO: 102.8 FL (ref 82–102)
MONOCYTES # BLD: 0.5 K/UL (ref 0.1–1.3)
MONOCYTES NFR BLD: 5 % (ref 4–12)
NEUTS SEG # BLD: 7.7 K/UL (ref 1.7–8.2)
NEUTS SEG NFR BLD: 86 % (ref 43–78)
NRBC # BLD: 0 K/UL (ref 0–0.2)
O2/TOTAL GAS SETTING VFR VENT: 30 %
PCO2 BLD: 45.7 MMHG (ref 35–45)
PEEP RESPIRATORY: 8 CMH2O
PH BLD: 7.4 (ref 7.35–7.45)
PHOSPHATE SERPL-MCNC: 1.5 MG/DL (ref 2.5–4.5)
PLATELET # BLD AUTO: 142 K/UL (ref 150–450)
PMV BLD AUTO: 9.9 FL (ref 9.4–12.3)
PO2 BLD: 67 MMHG (ref 75–100)
POTASSIUM SERPL-SCNC: 3.4 MMOL/L (ref 3.5–5.1)
PRESSURE SUPPORT SETTING VENT: 10 CMH2O
PROT SERPL-MCNC: 4.7 G/DL (ref 6.3–8.2)
RBC # BLD AUTO: 2.84 M/UL (ref 4.05–5.2)
RESPIRATORY RATE, POC: 14 (ref 5–40)
SAO2 % BLD: 93 % (ref 95–98)
SERVICE CMNT-IMP: ABNORMAL
SERVICE CMNT-IMP: NORMAL
SODIUM SERPL-SCNC: 141 MMOL/L (ref 136–145)
SPECIMEN TYPE: ABNORMAL
VENTILATION MODE VENT: ABNORMAL
VT SETTING VENT: 400 ML
WBC # BLD AUTO: 9 K/UL (ref 4.3–11.1)

## 2024-10-06 PROCEDURE — 3700000001 HC ADD 15 MINUTES (ANESTHESIA): Performed by: SURGERY

## 2024-10-06 PROCEDURE — 6360000002 HC RX W HCPCS: Performed by: SURGERY

## 2024-10-06 PROCEDURE — 36600 WITHDRAWAL OF ARTERIAL BLOOD: CPT

## 2024-10-06 PROCEDURE — 74018 RADEX ABDOMEN 1 VIEW: CPT

## 2024-10-06 PROCEDURE — 2700000000 HC OXYGEN THERAPY PER DAY

## 2024-10-06 PROCEDURE — 2000000000 HC ICU R&B

## 2024-10-06 PROCEDURE — 2580000003 HC RX 258: Performed by: STUDENT IN AN ORGANIZED HEALTH CARE EDUCATION/TRAINING PROGRAM

## 2024-10-06 PROCEDURE — 2580000003 HC RX 258: Performed by: EMERGENCY MEDICINE

## 2024-10-06 PROCEDURE — 6360000002 HC RX W HCPCS: Performed by: INTERNAL MEDICINE

## 2024-10-06 PROCEDURE — 84100 ASSAY OF PHOSPHORUS: CPT

## 2024-10-06 PROCEDURE — 85025 COMPLETE CBC W/AUTO DIFF WBC: CPT

## 2024-10-06 PROCEDURE — 3700000000 HC ANESTHESIA ATTENDED CARE: Performed by: SURGERY

## 2024-10-06 PROCEDURE — 94640 AIRWAY INHALATION TREATMENT: CPT

## 2024-10-06 PROCEDURE — 3600000004 HC SURGERY LEVEL 4 BASE: Performed by: SURGERY

## 2024-10-06 PROCEDURE — 6360000002 HC RX W HCPCS: Performed by: STUDENT IN AN ORGANIZED HEALTH CARE EDUCATION/TRAINING PROGRAM

## 2024-10-06 PROCEDURE — 71045 X-RAY EXAM CHEST 1 VIEW: CPT

## 2024-10-06 PROCEDURE — 82962 GLUCOSE BLOOD TEST: CPT

## 2024-10-06 PROCEDURE — 0DB80ZZ EXCISION OF SMALL INTESTINE, OPEN APPROACH: ICD-10-PCS | Performed by: SURGERY

## 2024-10-06 PROCEDURE — 88307 TISSUE EXAM BY PATHOLOGIST: CPT

## 2024-10-06 PROCEDURE — 82803 BLOOD GASES ANY COMBINATION: CPT

## 2024-10-06 PROCEDURE — 3600000014 HC SURGERY LEVEL 4 ADDTL 15MIN: Performed by: SURGERY

## 2024-10-06 PROCEDURE — 44120 REMOVAL OF SMALL INTESTINE: CPT | Performed by: SURGERY

## 2024-10-06 PROCEDURE — 2500000003 HC RX 250 WO HCPCS: Performed by: STUDENT IN AN ORGANIZED HEALTH CARE EDUCATION/TRAINING PROGRAM

## 2024-10-06 PROCEDURE — 6360000002 HC RX W HCPCS: Performed by: EMERGENCY MEDICINE

## 2024-10-06 PROCEDURE — 2500000003 HC RX 250 WO HCPCS: Performed by: EMERGENCY MEDICINE

## 2024-10-06 PROCEDURE — 2580000003 HC RX 258: Performed by: INTERNAL MEDICINE

## 2024-10-06 PROCEDURE — 6360000002 HC RX W HCPCS: Performed by: FAMILY MEDICINE

## 2024-10-06 PROCEDURE — 80053 COMPREHEN METABOLIC PANEL: CPT

## 2024-10-06 PROCEDURE — 2720000010 HC SURG SUPPLY STERILE: Performed by: SURGERY

## 2024-10-06 PROCEDURE — 5A0935A ASSISTANCE WITH RESPIRATORY VENTILATION, LESS THAN 24 CONSECUTIVE HOURS, HIGH NASAL FLOW/VELOCITY: ICD-10-PCS | Performed by: SURGERY

## 2024-10-06 PROCEDURE — 94003 VENT MGMT INPAT SUBQ DAY: CPT

## 2024-10-06 PROCEDURE — 2709999900 HC NON-CHARGEABLE SUPPLY: Performed by: SURGERY

## 2024-10-06 PROCEDURE — 99291 CRITICAL CARE FIRST HOUR: CPT | Performed by: INTERNAL MEDICINE

## 2024-10-06 RX ORDER — DEXAMETHASONE SODIUM PHOSPHATE 10 MG/ML
INJECTION INTRAMUSCULAR; INTRAVENOUS
Status: DISCONTINUED | OUTPATIENT
Start: 2024-10-06 | End: 2024-10-06 | Stop reason: SDUPTHER

## 2024-10-06 RX ORDER — FENTANYL CITRATE 50 UG/ML
100 INJECTION, SOLUTION INTRAMUSCULAR; INTRAVENOUS
Status: CANCELLED | OUTPATIENT
Start: 2024-10-06 | End: 2024-10-07

## 2024-10-06 RX ORDER — FENTANYL CITRATE 50 UG/ML
INJECTION, SOLUTION INTRAMUSCULAR; INTRAVENOUS
Status: DISCONTINUED | OUTPATIENT
Start: 2024-10-06 | End: 2024-10-06 | Stop reason: SDUPTHER

## 2024-10-06 RX ORDER — SODIUM CHLORIDE, SODIUM LACTATE, POTASSIUM CHLORIDE, CALCIUM CHLORIDE 600; 310; 30; 20 MG/100ML; MG/100ML; MG/100ML; MG/100ML
INJECTION, SOLUTION INTRAVENOUS CONTINUOUS
Status: CANCELLED | OUTPATIENT
Start: 2024-10-06

## 2024-10-06 RX ORDER — ONDANSETRON 2 MG/ML
INJECTION INTRAMUSCULAR; INTRAVENOUS
Status: DISCONTINUED | OUTPATIENT
Start: 2024-10-06 | End: 2024-10-06 | Stop reason: SDUPTHER

## 2024-10-06 RX ORDER — DIPHENHYDRAMINE HYDROCHLORIDE 50 MG/ML
12.5 INJECTION INTRAMUSCULAR; INTRAVENOUS
Status: CANCELLED | OUTPATIENT
Start: 2024-10-06 | End: 2024-10-07

## 2024-10-06 RX ORDER — HYDROMORPHONE HYDROCHLORIDE 2 MG/ML
0.5 INJECTION, SOLUTION INTRAMUSCULAR; INTRAVENOUS; SUBCUTANEOUS EVERY 5 MIN PRN
Status: CANCELLED | OUTPATIENT
Start: 2024-10-06

## 2024-10-06 RX ORDER — SODIUM CHLORIDE 9 MG/ML
INJECTION, SOLUTION INTRAVENOUS PRN
Status: CANCELLED | OUTPATIENT
Start: 2024-10-06

## 2024-10-06 RX ORDER — ALBUTEROL SULFATE 0.83 MG/ML
2.5 SOLUTION RESPIRATORY (INHALATION) EVERY 6 HOURS PRN
Status: DISCONTINUED | OUTPATIENT
Start: 2024-10-06 | End: 2024-10-11 | Stop reason: HOSPADM

## 2024-10-06 RX ORDER — OXYCODONE HYDROCHLORIDE 5 MG/1
5 TABLET ORAL
Status: CANCELLED | OUTPATIENT
Start: 2024-10-06 | End: 2024-10-07

## 2024-10-06 RX ORDER — PROPOFOL 10 MG/ML
INJECTION, EMULSION INTRAVENOUS
Status: DISCONTINUED | OUTPATIENT
Start: 2024-10-06 | End: 2024-10-06 | Stop reason: SDUPTHER

## 2024-10-06 RX ORDER — ONDANSETRON 2 MG/ML
4 INJECTION INTRAMUSCULAR; INTRAVENOUS
Status: CANCELLED | OUTPATIENT
Start: 2024-10-06 | End: 2024-10-07

## 2024-10-06 RX ORDER — SODIUM CHLORIDE 0.9 % (FLUSH) 0.9 %
5-40 SYRINGE (ML) INJECTION PRN
Status: CANCELLED | OUTPATIENT
Start: 2024-10-06

## 2024-10-06 RX ORDER — ACETAMINOPHEN 500 MG
1000 TABLET ORAL ONCE
Status: CANCELLED | OUTPATIENT
Start: 2024-10-06 | End: 2024-10-06

## 2024-10-06 RX ORDER — SODIUM CHLORIDE 0.9 % (FLUSH) 0.9 %
5-40 SYRINGE (ML) INJECTION EVERY 12 HOURS SCHEDULED
Status: CANCELLED | OUTPATIENT
Start: 2024-10-06

## 2024-10-06 RX ORDER — NALOXONE HYDROCHLORIDE 0.4 MG/ML
INJECTION, SOLUTION INTRAMUSCULAR; INTRAVENOUS; SUBCUTANEOUS PRN
Status: CANCELLED | OUTPATIENT
Start: 2024-10-06

## 2024-10-06 RX ORDER — ROCURONIUM BROMIDE 10 MG/ML
INJECTION, SOLUTION INTRAVENOUS
Status: DISCONTINUED | OUTPATIENT
Start: 2024-10-06 | End: 2024-10-06 | Stop reason: SDUPTHER

## 2024-10-06 RX ORDER — MORPHINE SULFATE 4 MG/ML
4 INJECTION, SOLUTION INTRAMUSCULAR; INTRAVENOUS ONCE
Status: COMPLETED | OUTPATIENT
Start: 2024-10-07 | End: 2024-10-06

## 2024-10-06 RX ORDER — MIDAZOLAM HYDROCHLORIDE 1 MG/ML
INJECTION INTRAMUSCULAR; INTRAVENOUS
Status: DISCONTINUED | OUTPATIENT
Start: 2024-10-06 | End: 2024-10-06 | Stop reason: SDUPTHER

## 2024-10-06 RX ORDER — MIDAZOLAM HYDROCHLORIDE 2 MG/2ML
2 INJECTION, SOLUTION INTRAMUSCULAR; INTRAVENOUS
Status: CANCELLED | OUTPATIENT
Start: 2024-10-06 | End: 2024-10-07

## 2024-10-06 RX ADMIN — ROCURONIUM BROMIDE 10 MG: 10 INJECTION, SOLUTION INTRAVENOUS at 09:27

## 2024-10-06 RX ADMIN — PROPOFOL 50 MG: 10 INJECTION, EMULSION INTRAVENOUS at 09:00

## 2024-10-06 RX ADMIN — SODIUM CHLORIDE, PRESERVATIVE FREE 10 ML: 5 INJECTION INTRAVENOUS at 10:57

## 2024-10-06 RX ADMIN — PHENYLEPHRINE HYDROCHLORIDE 200 MCG: 10 INJECTION INTRAVENOUS at 09:31

## 2024-10-06 RX ADMIN — FENTANYL CITRATE 25 MCG/HR: 0.05 INJECTION, SOLUTION INTRAMUSCULAR; INTRAVENOUS at 14:30

## 2024-10-06 RX ADMIN — FENTANYL CITRATE 50 MCG: 50 INJECTION, SOLUTION INTRAMUSCULAR; INTRAVENOUS at 09:56

## 2024-10-06 RX ADMIN — FENTANYL CITRATE 150 MCG/HR: 0.05 INJECTION, SOLUTION INTRAMUSCULAR; INTRAVENOUS at 03:43

## 2024-10-06 RX ADMIN — MORPHINE SULFATE 2 MG: 2 INJECTION, SOLUTION INTRAMUSCULAR; INTRAVENOUS at 17:17

## 2024-10-06 RX ADMIN — LINEZOLID 600 MG: 600 INJECTION, SOLUTION INTRAVENOUS at 17:47

## 2024-10-06 RX ADMIN — SODIUM CHLORIDE, SODIUM LACTATE, POTASSIUM CHLORIDE, AND CALCIUM CHLORIDE: 600; 310; 30; 20 INJECTION, SOLUTION INTRAVENOUS at 02:09

## 2024-10-06 RX ADMIN — MIDAZOLAM 6 MG/HR: 5 INJECTION INTRAMUSCULAR; INTRAVENOUS at 02:12

## 2024-10-06 RX ADMIN — MORPHINE SULFATE 2 MG: 2 INJECTION, SOLUTION INTRAMUSCULAR; INTRAVENOUS at 22:56

## 2024-10-06 RX ADMIN — POTASSIUM PHOSPHATE, MONOBASIC POTASSIUM PHOSPHATE, DIBASIC 15 MMOL: 224; 236 INJECTION, SOLUTION, CONCENTRATE INTRAVENOUS at 04:37

## 2024-10-06 RX ADMIN — MORPHINE SULFATE 4 MG: 4 INJECTION INTRAVENOUS at 23:57

## 2024-10-06 RX ADMIN — FENTANYL CITRATE 25 MCG: 50 INJECTION, SOLUTION INTRAMUSCULAR; INTRAVENOUS at 09:35

## 2024-10-06 RX ADMIN — LINEZOLID 600 MG: 600 INJECTION, SOLUTION INTRAVENOUS at 04:32

## 2024-10-06 RX ADMIN — PIPERACILLIN AND TAZOBACTAM 3375 MG: 3; .375 INJECTION, POWDER, LYOPHILIZED, FOR SOLUTION INTRAVENOUS at 20:17

## 2024-10-06 RX ADMIN — ONDANSETRON 4 MG: 2 INJECTION INTRAMUSCULAR; INTRAVENOUS at 10:06

## 2024-10-06 RX ADMIN — HYDRALAZINE HYDROCHLORIDE 5 MG: 20 INJECTION INTRAMUSCULAR; INTRAVENOUS at 23:17

## 2024-10-06 RX ADMIN — PANTOPRAZOLE SODIUM 40 MG: 40 INJECTION, POWDER, FOR SOLUTION INTRAVENOUS at 10:55

## 2024-10-06 RX ADMIN — PIPERACILLIN AND TAZOBACTAM 3375 MG: 3; .375 INJECTION, POWDER, LYOPHILIZED, FOR SOLUTION INTRAVENOUS at 04:07

## 2024-10-06 RX ADMIN — PHENYLEPHRINE HYDROCHLORIDE 200 MCG: 10 INJECTION INTRAVENOUS at 09:18

## 2024-10-06 RX ADMIN — FENTANYL CITRATE 25 MCG: 50 INJECTION, SOLUTION INTRAMUSCULAR; INTRAVENOUS at 09:00

## 2024-10-06 RX ADMIN — MIDAZOLAM 2 MG: 1 INJECTION INTRAMUSCULAR; INTRAVENOUS at 09:00

## 2024-10-06 RX ADMIN — SODIUM CHLORIDE, SODIUM LACTATE, POTASSIUM CHLORIDE, AND CALCIUM CHLORIDE: 600; 310; 30; 20 INJECTION, SOLUTION INTRAVENOUS at 18:38

## 2024-10-06 RX ADMIN — SODIUM CHLORIDE, PRESERVATIVE FREE 10 ML: 5 INJECTION INTRAVENOUS at 20:19

## 2024-10-06 RX ADMIN — PIPERACILLIN AND TAZOBACTAM 3375 MG: 3; .375 INJECTION, POWDER, LYOPHILIZED, FOR SOLUTION INTRAVENOUS at 15:17

## 2024-10-06 RX ADMIN — ALBUTEROL SULFATE 2.5 MG: 2.5 SOLUTION RESPIRATORY (INHALATION) at 11:59

## 2024-10-06 RX ADMIN — DEXAMETHASONE SODIUM PHOSPHATE 4 MG: 10 INJECTION INTRAMUSCULAR; INTRAVENOUS at 09:40

## 2024-10-06 RX ADMIN — MIDAZOLAM 1 MG: 1 INJECTION INTRAMUSCULAR; INTRAVENOUS at 10:20

## 2024-10-06 RX ADMIN — ROCURONIUM BROMIDE 40 MG: 10 INJECTION, SOLUTION INTRAVENOUS at 09:20

## 2024-10-06 RX ADMIN — SUGAMMADEX 200 MG: 100 INJECTION, SOLUTION INTRAVENOUS at 10:07

## 2024-10-06 ASSESSMENT — PULMONARY FUNCTION TESTS
PIF_VALUE: 16
PIF_VALUE: 18
PIF_VALUE: 16
PIF_VALUE: 24
PIF_VALUE: 22
PIF_VALUE: 20

## 2024-10-06 ASSESSMENT — PAIN DESCRIPTION - ORIENTATION
ORIENTATION: MID
ORIENTATION: OTHER (COMMENT)
ORIENTATION: MID

## 2024-10-06 ASSESSMENT — PAIN DESCRIPTION - DESCRIPTORS
DESCRIPTORS: ACHING;SORE
DESCRIPTORS: ACHING
DESCRIPTORS: ACHING

## 2024-10-06 ASSESSMENT — PAIN SCALES - GENERAL
PAINLEVEL_OUTOF10: 7
PAINLEVEL_OUTOF10: 0
PAINLEVEL_OUTOF10: 0
PAINLEVEL_OUTOF10: 7
PAINLEVEL_OUTOF10: 7
PAINLEVEL_OUTOF10: 8
PAINLEVEL_OUTOF10: 0

## 2024-10-06 ASSESSMENT — PAIN DESCRIPTION - LOCATION
LOCATION: ABDOMEN

## 2024-10-06 NOTE — PLAN OF CARE
Problem: Discharge Planning  Goal: Discharge to home or other facility with appropriate resources  Outcome: Progressing  Flowsheets (Taken 10/6/2024 0716)  Discharge to home or other facility with appropriate resources: Identify barriers to discharge with patient and caregiver     Problem: Safety - Adult  Goal: Free from fall injury  Outcome: Progressing     Problem: Skin/Tissue Integrity  Goal: Absence of new skin breakdown  Description: 1.  Monitor for areas of redness and/or skin breakdown  2.  Assess vascular access sites hourly  3.  Every 4-6 hours minimum:  Change oxygen saturation probe site  4.  Every 4-6 hours:  If on nasal continuous positive airway pressure, respiratory therapy assess nares and determine need for appliance change or resting period.  Outcome: Progressing     Problem: Safety - Medical Restraint  Goal: Remains free of injury from restraints (Restraint for Interference with Medical Device)  Description: INTERVENTIONS:  1. Determine that other, less restrictive measures have been tried or would not be effective before applying the restraint  2. Evaluate the patient's condition at the time of restraint application  3. Inform patient/family regarding the reason for restraint  4. Q2H: Monitor safety, psychosocial status, comfort, nutrition and hydration  Outcome: Progressing  Flowsheets (Taken 10/6/2024 0746)  Remains free of injury from restraints (restraint for interference with medical device): Determine that other, less restrictive measures have been tried or would not be effective before applying the restraint     Problem: Pain  Goal: Verbalizes/displays adequate comfort level or baseline comfort level  Outcome: Progressing  Flowsheets (Taken 10/6/2024 0716)  Verbalizes/displays adequate comfort level or baseline comfort level: Encourage patient to monitor pain and request assistance

## 2024-10-06 NOTE — PROGRESS NOTES
Likely 2/2 long term prednisone treatment  - f/u MRI femur     Ventilator check complete; patient has a #7.0 ET tube secured at the 21 at the teeth.  Patient is  sedated.  Patient is not able to follow commands.  Breath sounds are coarse.  Trachea is midline, Negative for subcutaneous air, and chest excursion is symmetric. Patient is also Negative for cyanosis and is Negative for pitting edema.  All alarms are set and audible.  Resuscitation bag is  at the head of the bed.      Ventilator Settings  Mode FIO2 Rate Tidal Volume  PEEP I:E Ratio   SIMV/PRVC  30 % 14 bpm 400 mL   8 cm H20 1:1.9      Peak airway pressure: 18 cm H20  Minute ventilation: 7.3 L      Casa Chin RCP

## 2024-10-06 NOTE — BRIEF OP NOTE
Brief Postoperative Note      Patient: Estefanía Hope  YOB: 1950  MRN: [id number]    Date of Procedure: 10/6/2024    Pre-Op Diagnosis: Recent history of small bowel resection with bowel left in discontinuity. Need for a second look operation.     Post-Op Diagnosis: Same with a small segment, 30 cm's, of bowel with questionable viability       Procedure(s):  EXPLORATORY LAPAROTOMY, REMOVAL OF ABTHERA WOUND DRESSING, RESECTION OF SMALL BOWEL AND SMALL BOWEL ANASTOMOSIS    Surgeon(s):  Crow Montero MD    Assistant:  * No surgical staff found *    Anesthesia: General    Estimated Blood Loss (mL): less than 50     Complications: None    Specimens:   ID Type Source Tests Collected by Time Destination   A : Segment of small bowel Tissue Abdomen SURGICAL PATHOLOGY Crow Montero MD 10/6/2024 0937        Implants:  * No implants in log *      Drains:   NG/OG/NJ/NE Tube Nasogastric Right nostril (Active)   Surrounding Skin Clean, dry & intact 10/06/24 0716   Securement device Tape 10/06/24 0716   Status Suction-low intermittent 10/06/24 0716   Placement Verified Gastric Contents 10/06/24 0716   Drainage Appearance Green;Brown 10/06/24 0716   Output (mL) 0 ml 10/06/24 0440   Action Taken Placement verified (comment) 10/06/24 0300       Urinary Catheter 10/04/24 (Active)   Catheter Indications Need for fluid volume management of the critically ill patient in a critical care setting 10/06/24 0747   Site Assessment No urethral drainage 10/06/24 0747   Urine Color Sharmaine 10/06/24 0300   Urine Appearance Clear 10/06/24 0300   Collection Container Standard 10/06/24 0300   Securement Method Securing device (Describe) 10/06/24 0300   Catheter Care  Perineal wipes 10/06/24 0300   Catheter Best Practices  Drainage tube clipped to bed;Catheter secured to thigh;Tamper seal intact;Bag below bladder;Bag not on floor;Lack of dependent loop in tubing;Drainage bag less than half full 10/06/24 0300   Status

## 2024-10-06 NOTE — PROGRESS NOTES
Patient back from the OR.  I spoke with Dr. Montero to get another 30 cm of colon for total of 100.  He indicates patient can be extubated    Anesthesia.  With the patient indicates patient had some secretions coming out of her mouth and did not try to extubate the patient.    Now on examination patient with diffuse rhonchi bilaterally did remove a fair amount of thick black/green secretions from her mouth.  Will get new x-rays since, unfortunately I feel high risk she aspirated.  Would continue Zosyn for now which is adequate to take care of aspiration, but may see if we need to do a bronchoscopy or not.    Will sedate as we get studies, would not rush given marked rhochi and getting aspirated out of ETT      Additional time spent 15 minutes    Avni Cadena MD

## 2024-10-06 NOTE — PROGRESS NOTES
Respiratory Mechanics completed and are as follows:  RR: 10  Ve: 6   VT: 481  RSBI: 21  NIF: -20         Patient extubated to an Airvo 40L 40% HFNC. Patient is not able to communicate (MD aware) and is negative for stridor. Breath sounds are diminished. No complications with extubation.     Casa Chin RCP

## 2024-10-06 NOTE — PROGRESS NOTES
TRANSFER - IN REPORT:    Verbal report received from SANGEETA Coreas on Estefanía Hope  being received from OR for routine progression of patient care      Report consisted of patient's Situation, Background, Assessment and   Recommendations(SBAR).     Information from the following report(s) Nurse Handoff Report, Index, Adult Overview, Surgery Report, Procedure Verification, and Quality Measures was reviewed with the receiving nurse.    Opportunity for questions and clarification was provided.      Assessment completed upon patient's arrival to unit and care assumed.

## 2024-10-06 NOTE — PERIOP NOTE
TRANSFER - OUT REPORT:    Verbal report given to DOMINIC Gray on Estefanía Hope  being transferred to ICU for routine post-op       Report consisted of patient's Situation, Background, Assessment and   Recommendations(SBAR).     Information from the following report(s) Nurse Handoff Report and Surgery Report was reviewed with the receiving nurse.           Lines:   PICC 10/05/24 Right Basilic (Active)   Central Line Being Utilized Yes 10/06/24 0300   Criteria for Appropriate Use Total parenteral nutrition 10/06/24 0747   Site Assessment Clean, dry & intact 10/06/24 0747   Phlebitis Assessment No symptoms 10/06/24 0716   Infiltration Assessment 0 10/06/24 0300   Extremity Circumference (cm) 44 cm 10/05/24 1117   External Catheter Length (cm) 40 cm 10/05/24 1117   Lumen #1 Color/Status Red;Alcohol cap present;Brisk blood return;Infusing 10/06/24 0716   Lumen #2 Color/Status Purple;Alcohol cap present;Brisk blood return;Infusing 10/06/24 0716   Line Care Connections checked and tightened;Cap changed 10/06/24 0716   Alcohol Cap Used Yes 10/06/24 0716   Date of Last Dressing Change 10/05/24 10/06/24 0716   Dressing Type Transparent w/CHG gel 10/06/24 0716   Dressing Status Clean, dry & intact 10/06/24 0716       Peripheral IV Left;Posterior Hand (Active)   Site Assessment Clean, dry & intact 10/06/24 0716   Line Status Capped;Flushed 10/06/24 0716   Line Care Connections checked and tightened;Cap changed 10/06/24 0716   Phlebitis Assessment No symptoms 10/06/24 0716   Infiltration Assessment 0 10/06/24 0716   Alcohol Cap Used Yes 10/06/24 0716   Dressing Status Clean, dry & intact 10/06/24 0716   Dressing Type Transparent 10/06/24 0716       Peripheral IV 10/03/24 Distal;Right Forearm (Active)   Site Assessment Clean, dry & intact 10/06/24 0716   Line Status Infusing;Capped 10/06/24 0716   Line Care Connections checked and tightened;Cap changed 10/06/24 0716   Phlebitis Assessment No symptoms 10/06/24 0716

## 2024-10-07 ENCOUNTER — APPOINTMENT (OUTPATIENT)
Dept: GENERAL RADIOLOGY | Age: 74
End: 2024-10-07
Payer: MEDICARE

## 2024-10-07 PROBLEM — J90 PLEURAL EFFUSION: Status: ACTIVE | Noted: 2024-10-07

## 2024-10-07 LAB
ALBUMIN SERPL-MCNC: 2.4 G/DL (ref 3.2–4.6)
ALBUMIN/GLOB SERPL: 0.7 (ref 1–1.9)
ALP SERPL-CCNC: 64 U/L (ref 35–104)
ALT SERPL-CCNC: 13 U/L (ref 8–45)
ANION GAP SERPL CALC-SCNC: 9 MMOL/L (ref 9–18)
AST SERPL-CCNC: 23 U/L (ref 15–37)
BASOPHILS # BLD: 0 K/UL (ref 0–0.2)
BASOPHILS NFR BLD: 0 % (ref 0–2)
BILIRUB SERPL-MCNC: 0.4 MG/DL (ref 0–1.2)
BUN SERPL-MCNC: 13 MG/DL (ref 8–23)
CALCIUM SERPL-MCNC: 8.5 MG/DL (ref 8.8–10.2)
CHLORIDE SERPL-SCNC: 105 MMOL/L (ref 98–107)
CO2 SERPL-SCNC: 27 MMOL/L (ref 20–28)
CREAT SERPL-MCNC: 0.72 MG/DL (ref 0.6–1.1)
DIFFERENTIAL METHOD BLD: ABNORMAL
EOSINOPHIL # BLD: 0 K/UL (ref 0–0.8)
EOSINOPHIL NFR BLD: 0 % (ref 0.5–7.8)
ERYTHROCYTE [DISTWIDTH] IN BLOOD BY AUTOMATED COUNT: 12.7 % (ref 11.9–14.6)
GLOBULIN SER CALC-MCNC: 3.4 G/DL (ref 2.3–3.5)
GLUCOSE BLD STRIP.AUTO-MCNC: 100 MG/DL (ref 65–100)
GLUCOSE BLD STRIP.AUTO-MCNC: 100 MG/DL (ref 65–100)
GLUCOSE BLD STRIP.AUTO-MCNC: 89 MG/DL (ref 65–100)
GLUCOSE BLD STRIP.AUTO-MCNC: 94 MG/DL (ref 65–100)
GLUCOSE SERPL-MCNC: 121 MG/DL (ref 70–99)
HCT VFR BLD AUTO: 31.6 % (ref 35.8–46.3)
HGB BLD-MCNC: 10 G/DL (ref 11.7–15.4)
IMM GRANULOCYTES # BLD AUTO: 0.1 K/UL (ref 0–0.5)
IMM GRANULOCYTES NFR BLD AUTO: 0 % (ref 0–5)
LYMPHOCYTES # BLD: 0.8 K/UL (ref 0.5–4.6)
LYMPHOCYTES NFR BLD: 7 % (ref 13–44)
MAGNESIUM SERPL-MCNC: 1.5 MG/DL (ref 1.8–2.4)
MAGNESIUM SERPL-MCNC: 1.9 MG/DL (ref 1.8–2.4)
MCH RBC QN AUTO: 32.2 PG (ref 26.1–32.9)
MCHC RBC AUTO-ENTMCNC: 31.6 G/DL (ref 31.4–35)
MCV RBC AUTO: 101.6 FL (ref 82–102)
MONOCYTES # BLD: 0.5 K/UL (ref 0.1–1.3)
MONOCYTES NFR BLD: 5 % (ref 4–12)
NEUTS SEG # BLD: 10.3 K/UL (ref 1.7–8.2)
NEUTS SEG NFR BLD: 88 % (ref 43–78)
NRBC # BLD: 0 K/UL (ref 0–0.2)
PHOSPHATE SERPL-MCNC: 1.5 MG/DL (ref 2.5–4.5)
PHOSPHATE SERPL-MCNC: 1.8 MG/DL (ref 2.5–4.5)
PHOSPHATE SERPL-MCNC: 1.9 MG/DL (ref 2.5–4.5)
PLATELET # BLD AUTO: 154 K/UL (ref 150–450)
PMV BLD AUTO: 9.9 FL (ref 9.4–12.3)
POTASSIUM SERPL-SCNC: 3.6 MMOL/L (ref 3.5–5.1)
PROT SERPL-MCNC: 5.8 G/DL (ref 6.3–8.2)
RBC # BLD AUTO: 3.11 M/UL (ref 4.05–5.2)
SERVICE CMNT-IMP: NORMAL
SODIUM SERPL-SCNC: 141 MMOL/L (ref 136–145)
TRIGL SERPL-MCNC: 106 MG/DL (ref 0–150)
WBC # BLD AUTO: 11.7 K/UL (ref 4.3–11.1)

## 2024-10-07 PROCEDURE — 6360000002 HC RX W HCPCS: Performed by: SURGERY

## 2024-10-07 PROCEDURE — 2700000000 HC OXYGEN THERAPY PER DAY

## 2024-10-07 PROCEDURE — 2580000003 HC RX 258: Performed by: EMERGENCY MEDICINE

## 2024-10-07 PROCEDURE — 99231 SBSQ HOSP IP/OBS SF/LOW 25: CPT | Performed by: INTERNAL MEDICINE

## 2024-10-07 PROCEDURE — 83735 ASSAY OF MAGNESIUM: CPT

## 2024-10-07 PROCEDURE — 2580000003 HC RX 258: Performed by: SURGERY

## 2024-10-07 PROCEDURE — 82962 GLUCOSE BLOOD TEST: CPT

## 2024-10-07 PROCEDURE — 3E0436Z INTRODUCTION OF NUTRITIONAL SUBSTANCE INTO CENTRAL VEIN, PERCUTANEOUS APPROACH: ICD-10-PCS | Performed by: SURGERY

## 2024-10-07 PROCEDURE — 2500000003 HC RX 250 WO HCPCS: Performed by: SURGERY

## 2024-10-07 PROCEDURE — 6360000002 HC RX W HCPCS: Performed by: EMERGENCY MEDICINE

## 2024-10-07 PROCEDURE — 85025 COMPLETE CBC W/AUTO DIFF WBC: CPT

## 2024-10-07 PROCEDURE — 6370000000 HC RX 637 (ALT 250 FOR IP): Performed by: SURGERY

## 2024-10-07 PROCEDURE — 71045 X-RAY EXAM CHEST 1 VIEW: CPT

## 2024-10-07 PROCEDURE — 99232 SBSQ HOSP IP/OBS MODERATE 35: CPT | Performed by: INTERNAL MEDICINE

## 2024-10-07 PROCEDURE — 84478 ASSAY OF TRIGLYCERIDES: CPT

## 2024-10-07 PROCEDURE — 80053 COMPREHEN METABOLIC PANEL: CPT

## 2024-10-07 PROCEDURE — 6360000002 HC RX W HCPCS

## 2024-10-07 PROCEDURE — 2500000003 HC RX 250 WO HCPCS: Performed by: EMERGENCY MEDICINE

## 2024-10-07 PROCEDURE — 36592 COLLECT BLOOD FROM PICC: CPT

## 2024-10-07 PROCEDURE — 84100 ASSAY OF PHOSPHORUS: CPT

## 2024-10-07 PROCEDURE — 2000000000 HC ICU R&B

## 2024-10-07 PROCEDURE — 6360000002 HC RX W HCPCS: Performed by: INTERNAL MEDICINE

## 2024-10-07 RX ORDER — POTASSIUM CHLORIDE 7.45 MG/ML
10 INJECTION INTRAVENOUS PRN
Status: DISCONTINUED | OUTPATIENT
Start: 2024-10-07 | End: 2024-10-11 | Stop reason: HOSPADM

## 2024-10-07 RX ORDER — METOCLOPRAMIDE HYDROCHLORIDE 5 MG/ML
5 INJECTION INTRAMUSCULAR; INTRAVENOUS EVERY 6 HOURS
Status: DISCONTINUED | OUTPATIENT
Start: 2024-10-07 | End: 2024-10-09

## 2024-10-07 RX ORDER — MORPHINE SULFATE 4 MG/ML
4 INJECTION, SOLUTION INTRAMUSCULAR; INTRAVENOUS ONCE
Status: COMPLETED | OUTPATIENT
Start: 2024-10-07 | End: 2024-10-07

## 2024-10-07 RX ORDER — MORPHINE SULFATE 2 MG/ML
4 INJECTION, SOLUTION INTRAMUSCULAR; INTRAVENOUS
Status: DISCONTINUED | OUTPATIENT
Start: 2024-10-07 | End: 2024-10-11 | Stop reason: HOSPADM

## 2024-10-07 RX ORDER — POTASSIUM CHLORIDE 29.8 MG/ML
20 INJECTION INTRAVENOUS PRN
Status: DISCONTINUED | OUTPATIENT
Start: 2024-10-07 | End: 2024-10-11 | Stop reason: HOSPADM

## 2024-10-07 RX ADMIN — LINEZOLID 600 MG: 600 INJECTION, SOLUTION INTRAVENOUS at 04:30

## 2024-10-07 RX ADMIN — SODIUM CHLORIDE, PRESERVATIVE FREE 10 ML: 5 INJECTION INTRAVENOUS at 07:41

## 2024-10-07 RX ADMIN — ACETAMINOPHEN 650 MG: 325 TABLET ORAL at 20:31

## 2024-10-07 RX ADMIN — MORPHINE SULFATE 2 MG: 2 INJECTION, SOLUTION INTRAMUSCULAR; INTRAVENOUS at 07:41

## 2024-10-07 RX ADMIN — SODIUM CHLORIDE, PRESERVATIVE FREE 10 ML: 5 INJECTION INTRAVENOUS at 20:16

## 2024-10-07 RX ADMIN — MORPHINE SULFATE 4 MG: 4 INJECTION INTRAVENOUS at 03:08

## 2024-10-07 RX ADMIN — SODIUM PHOSPHATE, MONOBASIC, MONOHYDRATE AND SODIUM PHOSPHATE, DIBASIC, ANHYDROUS 15 MMOL: 276; 142 INJECTION, SOLUTION INTRAVENOUS at 10:59

## 2024-10-07 RX ADMIN — PANTOPRAZOLE SODIUM 40 MG: 40 INJECTION, POWDER, FOR SOLUTION INTRAVENOUS at 07:41

## 2024-10-07 RX ADMIN — PIPERACILLIN AND TAZOBACTAM 3375 MG: 3; .375 INJECTION, POWDER, LYOPHILIZED, FOR SOLUTION INTRAVENOUS at 03:59

## 2024-10-07 RX ADMIN — SODIUM PHOSPHATE, MONOBASIC, MONOHYDRATE AND SODIUM PHOSPHATE, DIBASIC, ANHYDROUS 15 MMOL: 276; 142 INJECTION, SOLUTION INTRAVENOUS at 04:28

## 2024-10-07 RX ADMIN — METOCLOPRAMIDE 5 MG: 5 INJECTION, SOLUTION INTRAMUSCULAR; INTRAVENOUS at 18:06

## 2024-10-07 RX ADMIN — SODIUM CHLORIDE, SODIUM LACTATE, POTASSIUM CHLORIDE, AND CALCIUM CHLORIDE: 600; 310; 30; 20 INJECTION, SOLUTION INTRAVENOUS at 02:06

## 2024-10-07 RX ADMIN — PIPERACILLIN AND TAZOBACTAM 3375 MG: 3; .375 INJECTION, POWDER, LYOPHILIZED, FOR SOLUTION INTRAVENOUS at 20:21

## 2024-10-07 RX ADMIN — I.V. FAT EMULSION 250 ML: 20 EMULSION INTRAVENOUS at 18:21

## 2024-10-07 RX ADMIN — MORPHINE SULFATE 4 MG: 2 INJECTION, SOLUTION INTRAMUSCULAR; INTRAVENOUS at 20:15

## 2024-10-07 RX ADMIN — PIPERACILLIN AND TAZOBACTAM 3375 MG: 3; .375 INJECTION, POWDER, LYOPHILIZED, FOR SOLUTION INTRAVENOUS at 11:19

## 2024-10-07 RX ADMIN — ONDANSETRON 4 MG: 2 INJECTION INTRAMUSCULAR; INTRAVENOUS at 20:15

## 2024-10-07 RX ADMIN — MORPHINE SULFATE 4 MG: 2 INJECTION, SOLUTION INTRAMUSCULAR; INTRAVENOUS at 13:15

## 2024-10-07 RX ADMIN — SODIUM CHLORIDE, SODIUM LACTATE, POTASSIUM CHLORIDE, AND CALCIUM CHLORIDE: 600; 310; 30; 20 INJECTION, SOLUTION INTRAVENOUS at 11:00

## 2024-10-07 RX ADMIN — POTASSIUM CHLORIDE: 2 INJECTION, SOLUTION, CONCENTRATE INTRAVENOUS at 18:20

## 2024-10-07 RX ADMIN — SODIUM PHOSPHATE, MONOBASIC, MONOHYDRATE AND SODIUM PHOSPHATE, DIBASIC, ANHYDROUS 15 MMOL: 276; 142 INJECTION, SOLUTION INTRAVENOUS at 18:18

## 2024-10-07 RX ADMIN — MAGNESIUM SULFATE HEPTAHYDRATE 2000 MG: 40 INJECTION, SOLUTION INTRAVENOUS at 11:01

## 2024-10-07 RX ADMIN — METOCLOPRAMIDE 5 MG: 5 INJECTION, SOLUTION INTRAMUSCULAR; INTRAVENOUS at 23:50

## 2024-10-07 RX ADMIN — METOCLOPRAMIDE 5 MG: 5 INJECTION, SOLUTION INTRAMUSCULAR; INTRAVENOUS at 11:06

## 2024-10-07 ASSESSMENT — PAIN SCALES - GENERAL
PAINLEVEL_OUTOF10: 0
PAINLEVEL_OUTOF10: 7
PAINLEVEL_OUTOF10: 8
PAINLEVEL_OUTOF10: 0
PAINLEVEL_OUTOF10: 10
PAINLEVEL_OUTOF10: 10

## 2024-10-07 ASSESSMENT — PAIN DESCRIPTION - ORIENTATION
ORIENTATION: MID
ORIENTATION: MID
ORIENTATION: ANTERIOR
ORIENTATION: MID

## 2024-10-07 ASSESSMENT — PAIN DESCRIPTION - LOCATION
LOCATION: ABDOMEN

## 2024-10-07 ASSESSMENT — PAIN DESCRIPTION - DESCRIPTORS
DESCRIPTORS: DISCOMFORT;SORE
DESCRIPTORS: DISCOMFORT;SORE
DESCRIPTORS: CRAMPING
DESCRIPTORS: ACHING

## 2024-10-07 ASSESSMENT — PAIN - FUNCTIONAL ASSESSMENT: PAIN_FUNCTIONAL_ASSESSMENT: PREVENTS OR INTERFERES SOME ACTIVE ACTIVITIES AND ADLS

## 2024-10-07 ASSESSMENT — PAIN DESCRIPTION - FREQUENCY: FREQUENCY: INTERMITTENT

## 2024-10-07 ASSESSMENT — PAIN DESCRIPTION - PAIN TYPE: TYPE: SURGICAL PAIN

## 2024-10-07 ASSESSMENT — PAIN DESCRIPTION - ONSET: ONSET: ON-GOING

## 2024-10-07 NOTE — CONSULTS
Comprehensive Nutrition Assessment    Type and Reason for Visit: Initial, Consult  Parenteral Nutrition Management (Surgery)    Nutrition Recommendations/Plan:   Parenteral Nutrition:  Central parenteral nutrition    Central line infusion  Initiate: Dex 5%, 4.25% AA 2 L (85ml/hr)   Initiate 250 ml 20% lipids daily  To provide: 1180 kcal/day (80% of needs), 85 grams of protein/day (100% of needs), 100 grams of CHO/day and ~2250 ml of total volume/day.   Above regimen: Initiation regimen not intended to meet macronutrient needs  Electrolytes/L:   Sodium ( 75 meq NaCl), Potassium ( 20 meq KCl) Phosphorus ( 15 mmol KPO4), Calcium (4.5 meq), Magnesium 10 meq   Additives per bag:   MVI, MTE  Thiamine 100 mg daily x 7 days (EOT 10/13)  Folic Acid 1 mg daily x 7 days (EOT 10/13)  Nutrition Related Medication Management:  Electrolyte Replacement Protocol PRN Modify for central line access for Potassium, continue for Mag and Phos   IVF:  Discontinue at 1800  Labs:   BMP daily  Mg daily x 7 days at initiation then MWF  Phos daily x 7 days at initiation then MWF    Triglyceride tomorrow  Hepatic Panel weekly on Tuesdays  POC Glucoses/SSI Active     Malnutrition Assessment:  Malnutrition Status: At risk for malnutrition (Comment) (S/p open abdominal surgery x2, NPO)  C  Mild temporal wasting  Nutrition Assessment:  Nutrition History: Daughter at bedside provides history. Usual intake is 2 meals per day. No changes prior to acute events. Symptoms started day of admission.         Weight History: Very limited. Last office visit 195# on 9/1/23. Daughter reports minimal weight changes.  Nutrition Background:       PMH: HTN, hypothyroidism. Presented with abdominal pain and N/V. CT with SBO and hiatal hernia. Small bowel resection with abthera wound dressing and wound vac 10/4. Returned to OR 10/6 for additional small bowel removal and closure of abdominal wound.   Nutrition Interval:  PICC placed 10/5.     Patient seen with family  Needs:  Energy (kcal/day): 0213-8335 (25-30 kcal/kg) (Kcal/kg Weight used: 59.1 kg Ideal  Protein (g/day): 77-89 (1.3-1.5 g/kg) Weight Used: (Ideal) 59.1 kg  Fluid (ml/day):   (1 ml/kcal)    Nutrition Diagnosis:   Inadequate oral intake related to altered GI function as evidenced by NPO or clear liquid status due to medical condition  Nutrition Interventions:   Food and/or Nutrient Delivery: Start Parenteral Nutrition     Coordination of Nutrition Care: Continue to monitor while inpatient      Goals:      Active Goal: Tolerate nutrition support at goal rate (within 5 days)       Nutrition Monitoring and Evaluation:      Food/Nutrient Intake Outcomes: Parenteral Nutrition Intake/Tolerance  Physical Signs/Symptoms Outcomes: Biochemical Data, GI Status, Fluid Status or Edema, Weight    Discharge Planning:    Too soon to determine    DANITZA NESS, RD

## 2024-10-07 NOTE — PROGRESS NOTES
completed follow up visit with patient and sister. Patient expressed relief to have her surgeries behind her and engaged in brief life review.    provided pastoral presence, prayer and empathetic listening.  Peace be with you,  Signed by  SUSANA SteinerDiv.   [phone]   Libtayo Pregnancy And Lactation Text: This medication is contraindicated in pregnancy and when breast feeding.

## 2024-10-07 NOTE — CARE COORDINATION
Case Management Assessment  Initial Evaluation    Date/Time of Evaluation: 10/7/2024 12:35 PM  Assessment Completed by: Cornelia Moya RN    If patient is discharged prior to next notation, then this note serves as note for discharge by case management.    Patient Name: Estefanía Hope                   YOB: 1950  Diagnosis: SBO (small bowel obstruction) ([facility]) [K56.609]                   Date / Time: 10/3/2024 10:59 PM    Patient Admission Status: Inpatient   Readmission Risk (Low < 19, Mod (19-27), High > 27): Readmission Risk Score: 14.2    Current PCP: Javier Avilez, APRN - NP  PCP verified by CM? (P) Yes    Chart Reviewed: Yes      History Provided by: (P) Child/Family  Patient Orientation: (P) Alert and Oriented    Patient Cognition: (P) Alert    Hospitalization in the last 30 days (Readmission):  No    If yes, Readmission Assessment in CM Navigator will be completed.    Advance Directives:      Code Status: Full Code   Patient's Primary Decision Maker is: (P) Named in Scanned ACP Document      Discharge Planning:    Patient lives with: (P) Family Members Type of Home: (P) Other (Comment) (pending)  Primary Care Giver: (P) Self  Patient Support Systems include: (P) Children, Family Members   Current Financial resources: (P) Medicare, Other (Comment) (MCR/BCBS)  Current community resources: (P) None  Current services prior to admission: (P) Durable Medical Equipment            Current DME: (P) Cane            Type of Home Care services:       ADLS  Prior functional level: (P) Independent in ADLs/IADLs  Current functional level: (P) Assistance with the following:    PT AM-PAC:   /24  OT AM-PAC:   /24    Family can provide assistance at DC: (P) Yes  Would you like Case Management to discuss the discharge plan with any other family members/significant others, and if so, who? (P) Yes  Plans to Return to Present Housing: (P) Unknown at present  Other Identified Issues/Barriers to RETURNING to

## 2024-10-07 NOTE — PROGRESS NOTES
General Surgery Progress Note    10/7/2024    Admit Date: 10/3/2024    Subjective:   Surgery POD #1  The patient remains extubated after surgery on 10/6/24. She is still in ICU bed number 9. Using Airvo at 30. I updated the patient's daughter.    Objective:     BP (!) 161/74   Pulse (!) 104   Temp 99.1 °F (37.3 °C) (Axillary)   Resp 17   Ht 1.676 m (5' 6\")   Wt 83.9 kg (185 lb) Comment: By sister  SpO2 97%   BMI 29.86 kg/m²       Intake/Output Summary (Last 24 hours) at 10/7/2024 1043  Last data filed at 10/7/2024 0900  Gross per 24 hour   Intake 2934.34 ml   Output 2625 ml   Net 309.34 ml        EXAM:  ABD soft, incisional tenderness to palpation, active BS's. Wound is clean and dry.       Data Review    Recent Results (from the past 24 hour(s))   POCT Glucose    Collection Time: 10/06/24  6:04 PM   Result Value Ref Range    POC Glucose 113 (H) 65 - 100 mg/dL    Performed by: Jean    POCT Glucose    Collection Time: 10/06/24  8:09 PM   Result Value Ref Range    POC Glucose 131 (H) 65 - 100 mg/dL    Performed by: Fartun    CBC with Auto Differential    Collection Time: 10/07/24  3:04 AM   Result Value Ref Range    WBC 11.7 (H) 4.3 - 11.1 K/uL    RBC 3.11 (L) 4.05 - 5.2 M/uL    Hemoglobin 10.0 (L) 11.7 - 15.4 g/dL    Hematocrit 31.6 (L) 35.8 - 46.3 %    .6 82 - 102 FL    MCH 32.2 26.1 - 32.9 PG    MCHC 31.6 31.4 - 35.0 g/dL    RDW 12.7 11.9 - 14.6 %    Platelets 154 150 - 450 K/uL    MPV 9.9 9.4 - 12.3 FL    nRBC 0.00 0.0 - 0.2 K/uL    Differential Type AUTOMATED      Neutrophils % 88 (H) 43 - 78 %    Lymphocytes % 7 (L) 13 - 44 %    Monocytes % 5 4.0 - 12.0 %    Eosinophils % 0 (L) 0.5 - 7.8 %    Basophils % 0 0.0 - 2.0 %    Immature Granulocytes % 0 0.0 - 5.0 %    Neutrophils Absolute 10.3 (H) 1.7 - 8.2 K/UL    Lymphocytes Absolute 0.8 0.5 - 4.6 K/UL    Monocytes Absolute 0.5 0.1 - 1.3 K/UL    Eosinophils Absolute 0.0 0.0 - 0.8 K/UL    Basophils Absolute 0.0 0.0 - 0.2 K/UL

## 2024-10-07 NOTE — INTERDISCIPLINARY ROUNDS
Multi-D Rounds/Checklist (leapfrog):  Lines: can any be removed?: None    NG/OG/NJ/NE Tube Nasogastric Right nostril (Active)       Urinary Catheter 10/04/24 (Active)     PICC 10/05/24 Right Basilic (Active)     DVT Prophylaxis: Ordered  Vent: N/A  Nutrition Ordered/appropriate: Contraindicated- surgery  Can antibiotics or other drugs be stopped? Yes/End Date set Yes/No  Inpat Anti-Infectives (From admission, onward)       Start     Ordered Stop    10/05/24 0500  linezolid (ZYVOX) IVPB 600 mg  600 mg,   IntraVENous,   EVERY 12 HOURS         10/04/24 2100 --    10/04/24 2015  piperacillin-tazobactam (ZOSYN) 3,375 mg in sodium chloride 0.9 % 50 mL IVPB (mini-bag)  3,375 mg,   IntraVENous,   EVERY 8 HOURS        Placed in \"Followed by\" Linked Group    10/04/24 0824 --                  Consults needed: None  A: Is pain control adequate? (has PRNs? Stop drip?) Orders adjusted--adjust per surgery  B: Sedation break and SBT? N/A  C: Is sedation choice appropriate? N/A  D: Delirium/CAM-ICU? No  E: Mobility goals/appropriateness? Yes  F: Family update and plan? friend is surrogate decision maker and is being updated daily by primary attending and nursing staff.    Nicci Lizama, APRN - CNP

## 2024-10-07 NOTE — OP NOTE
[address]  [address]                            OPERATIVE REPORT      PATIENT NAME: NIVIA CAMPOS           : 1950  MED REC NO: [id number]                       ROOM: 3109  ACCOUNT NO: [id number]                       ADMIT DATE: 10/03/2024  PROVIDER: Crow Montero MD    DATE OF SERVICE:  10/06/2024    PREOPERATIVE DIAGNOSES:  Recent history of small bowel resection with bowel left in discontinuity, need for second-look operation.    POSTOPERATIVE DIAGNOSES:  Recent history of small bowel resection with bowel left in discontinuity, need for second-look operation with a small segment of 30 cm, which had questionable viability.    PROCEDURES PERFORMED:  Exploratory laparotomy, removal of ABThera wound dressing, resection of small bowel and small bowel primary anastomosis.    SURGEON:  Crow Montero MD    ASSISTANT:  None.    ANESTHESIA:  General endotracheal anesthesia with Dr. Martino and Lyudmila, the nurse anesthetist.    ESTIMATED BLOOD LOSS:  Less than 50 mL.    SPECIMENS REMOVED:  Segment of small bowel.     COMPLICATIONS:  None.    IMPLANTS:  None.    INDICATIONS:  This is a 73-year-old female, who I inherited from Dr. Durham, surgeon.  Dr. Durham took the patient to surgery in the morning of 10/04/2024 that she was septic with hypotension and needed emergent surgery.  She was found to have an internal hernia with ischemic bowel.  He took out 70 cm of bowel.  She was on multiple pressors at the time and he did not feel comfortable putting her back together, so he left her in discontinuity with the 2 ends separate.  Dr. Durham left at noon on 10/04 and I inherited the patient as I was on-call for the weekend of 10/04 to 10/06.  I saw the patient and family on 10/05 and discussed exploratory laparotomy and re-establishing the integrity of the small intestine.  I said she was doing well off pressors, hemodynamically  evidence of any bleeding.  I closed the mucosa with a running 3-0 Vicryl suture, seromuscular layer with interrupted sutures of 3-0 silk.  I closed the mesenteric defect with a running 2-0 silk suture.  The bowel appeared viable and anastomosis was patent.  I tried to press fluid through it.  There was no evidence of any leak.  At this point, I did not see any other need to do anything else.  The NG tube was palpated in the stomach. Liver appeared normal. Gallbladder was removed.  Colon had a large amount of fecal material in it. It was difficult to appreciate if there were any masses due to the large amount of fecal material.  The lap and instrument counts were found to be correct x2.  I closed the fascia with two #1 looped PDS sutures.  I closed the skin with surgical staples, but I left a gap so that any fluid that may accumulate should be expressed through these openings as she is at high risk for wound infection.  The PATOS she had an ABThera on, so I am assuming that the skin and soft tissue was infected due to the fact it was a base clean open wound.  The patient tolerated the procedure well, was extubated, brought to recovery room in stable condition.    DRAINS:  None.        MD GEE MUIRA/AQS  D:  10/06/2024 12:30:00  T:  10/06/2024 16:10:16  JOB #:  [id number]

## 2024-10-07 NOTE — PROGRESS NOTES
Bedside and verbal shift change report received from   RN (offgoing nurse). Report included the following information SBAR, Kardex, Intake/Output, MAR, Recent Results, Med Rec Status, Cardiac Rhythm ST, and Alarm Parameters .

## 2024-10-07 NOTE — PROGRESS NOTES
[facility] CARDIOLOGY PROGRESS NOTE           10/7/2024 11:53 AM    Admit Date: 10/3/2024      Subjective:   No chest pain    Objective:      Vitals:    10/07/24 0930 10/07/24 1000 10/07/24 1130 10/07/24 1135   BP: (!) 165/75 (!) 161/74     Pulse: (!) 111 (!) 104 (!) 104    Resp: 16 17 16    Temp:       TempSrc:       SpO2: 96% 97% 96%    Weight:   93.8 kg (206 lb 12.7 oz)    Height:    1.676 m (5' 6\")       Physical Exam:  General- awake and alertNo Acute Distress  Neck- supple, no JVD  CV- regular rate and rhythm no MRG  Ext- no edema bilaterally.  Skin- warm and dry    Data Review:   Recent Labs     10/05/24  0910 10/05/24  1347 10/06/24  0205 10/07/24  0304 10/07/24  0854     --  141 141  --    K 4.0  --  3.4* 3.6  --    MG 1.3*  --   --   --  1.5*   BUN 32*  --  25* 13  --    WBC  --    < > 9.0 11.7*  --    HGB  --    < > 9.1* 10.0*  --    HCT  --    < > 29.2* 31.6*  --    PLT  --    < > 142* 154  --     < > = values in this interval not displayed.       Assessment/Plan:     CV stable post abdominal sx  Rhythm is sinus tach  On standby    LEILA AGUIRRE MD  10/7/2024 11:53 AM

## 2024-10-07 NOTE — ACP (ADVANCE CARE PLANNING)
Advance Care Planning     Advance Care Planning Activator (Inpatient)  Conversation Note      Date of ACP Conversation: 10/7/2024         ACP Activator: Cornelia Moya RN    {When Decision Maker makes decisions on behalf of the incapacitated patient: Decision Maker is asked to consider and make decisions based on patient values, known preferences, or best interests.     Health Care Decision Maker: NO LW/HCPOA on file currently. Does have one per pt and sister. Sister is listed agent. Pt has 3 children. Explained that unless on file, children would be legal NOK. They both understand.       Current Designated Health Care Decision Maker:     Primary Decision Maker: Garima Burch - Brother/Sister - [phone]  Click here to complete Healthcare Decision Makers including section of the Healthcare Decision Maker Relationship (ie \"Primary\")  Today we documented Decision Maker(s) consistent with Legal Next of Kin hierarchy.    Care Preferences    Full code per MD orders.

## 2024-10-07 NOTE — PROGRESS NOTES
[facility] Critical Care Note:: 10/7/2024  Estefanía Hope  Admission Date: 10/3/2024     Length of Stay: 4 days    Background: 73 y.o. female with need for Ventilatory Support after have surgery for ischemic bowel.     Per notes has HTN/Hypothyroidism and came in with abdominal pain with N/V. CT noted SBO with hiatal hernia. Patient also with WBC in 20's and taken to OR on 10/4. Note to have Ischemic bowel with Small bowel resection with abthera wound dressing and vac applied.   Back to ICU 10/6 for additional small bowel resection. Extubated 10/6.       Notable PMH:  has a past medical history of Anemia, Arrhythmia, Arthritis, Asthma, Chronic pain, Gastrointestinal disorder, GERD (gastroesophageal reflux disease), High cholesterol, Hypertension, Morbid obesity, Nausea & vomiting, Other ill-defined conditions(799.89), Psychiatric disorder, and Thyroid disease.    24 Hour events:   Pt went back to OR yesterday with additional small bowel removal and closure of abdominal wound. Concern for possible aspiration in the periop period. Remained intubated.   Pt extubated last night after 2 days on vent.   Currently on 30% airvo satting 100%.   CXR this morning with moderate R and small L effusion.       Review of Systems: Comprehensive ROS negative except in HPI    Lines: (insertion date)    NG/OG/NJ/NE Tube Nasogastric Right nostril (Active)       Urinary Catheter 10/04/24 (Active)     PICC 10/05/24 Right Basilic (Active)     Drips: current dose (range)  Dose (mg/hr) Midazolam: 0 mg/hr  Dose (mcg/kg/min) Propofol : *50 mg  Dose (mcg/hr) Fentanyl: 0 mcg/hr (not running on assessment)  Dose (mcg/min) Norepinephrine: 0 mcg/min  Dose (mcg/min) Phenylephrine : *200 mcg     Pertinent Exam:         Blood pressure (!) 188/79, pulse (!) 104, temperature 99 °F (37.2 °C), temperature source Axillary, resp. rate 12, height 1.676 m (5' 6\"), weight 83.9 kg (185 lb), SpO2 100%.   Intake/Output Summary (Last 24 hours)

## 2024-10-08 LAB
ANION GAP SERPL CALC-SCNC: 8 MMOL/L (ref 9–18)
ANION GAP SERPL CALC-SCNC: 8 MMOL/L (ref 9–18)
BASOPHILS # BLD: 0 K/UL (ref 0–0.2)
BASOPHILS NFR BLD: 0 % (ref 0–2)
BUN SERPL-MCNC: 10 MG/DL (ref 8–23)
BUN SERPL-MCNC: 11 MG/DL (ref 8–23)
CALCIUM SERPL-MCNC: 7.7 MG/DL (ref 8.8–10.2)
CALCIUM SERPL-MCNC: 8.1 MG/DL (ref 8.8–10.2)
CHLORIDE SERPL-SCNC: 102 MMOL/L (ref 98–107)
CHLORIDE SERPL-SCNC: 104 MMOL/L (ref 98–107)
CO2 SERPL-SCNC: 27 MMOL/L (ref 20–28)
CO2 SERPL-SCNC: 28 MMOL/L (ref 20–28)
CREAT SERPL-MCNC: 0.62 MG/DL (ref 0.6–1.1)
CREAT SERPL-MCNC: 0.62 MG/DL (ref 0.6–1.1)
DIFFERENTIAL METHOD BLD: ABNORMAL
EOSINOPHIL # BLD: 0.1 K/UL (ref 0–0.8)
EOSINOPHIL NFR BLD: 1 % (ref 0.5–7.8)
ERYTHROCYTE [DISTWIDTH] IN BLOOD BY AUTOMATED COUNT: 12.6 % (ref 11.9–14.6)
GLUCOSE BLD STRIP.AUTO-MCNC: 102 MG/DL (ref 65–100)
GLUCOSE BLD STRIP.AUTO-MCNC: 117 MG/DL (ref 65–100)
GLUCOSE BLD STRIP.AUTO-MCNC: 123 MG/DL (ref 65–100)
GLUCOSE SERPL-MCNC: 105 MG/DL (ref 70–99)
GLUCOSE SERPL-MCNC: 119 MG/DL (ref 70–99)
HCT VFR BLD AUTO: 27.3 % (ref 35.8–46.3)
HGB BLD-MCNC: 8.8 G/DL (ref 11.7–15.4)
IMM GRANULOCYTES # BLD AUTO: 0 K/UL (ref 0–0.5)
IMM GRANULOCYTES NFR BLD AUTO: 0 % (ref 0–5)
LYMPHOCYTES # BLD: 1.2 K/UL (ref 0.5–4.6)
LYMPHOCYTES NFR BLD: 17 % (ref 13–44)
MAGNESIUM SERPL-MCNC: 1.9 MG/DL (ref 1.8–2.4)
MCH RBC QN AUTO: 32.2 PG (ref 26.1–32.9)
MCHC RBC AUTO-ENTMCNC: 32.2 G/DL (ref 31.4–35)
MCV RBC AUTO: 100 FL (ref 82–102)
MONOCYTES # BLD: 0.6 K/UL (ref 0.1–1.3)
MONOCYTES NFR BLD: 8 % (ref 4–12)
NEUTS SEG # BLD: 5.3 K/UL (ref 1.7–8.2)
NEUTS SEG NFR BLD: 74 % (ref 43–78)
NRBC # BLD: 0 K/UL (ref 0–0.2)
PHOSPHATE SERPL-MCNC: 2.4 MG/DL (ref 2.5–4.5)
PHOSPHATE SERPL-MCNC: 2.8 MG/DL (ref 2.5–4.5)
PLATELET # BLD AUTO: 146 K/UL (ref 150–450)
PMV BLD AUTO: 9.2 FL (ref 9.4–12.3)
POTASSIUM SERPL-SCNC: 3 MMOL/L (ref 3.5–5.1)
POTASSIUM SERPL-SCNC: 3.7 MMOL/L (ref 3.5–5.1)
RBC # BLD AUTO: 2.73 M/UL (ref 4.05–5.2)
SERVICE CMNT-IMP: ABNORMAL
SODIUM SERPL-SCNC: 138 MMOL/L (ref 136–145)
SODIUM SERPL-SCNC: 140 MMOL/L (ref 136–145)
TRIGL SERPL-MCNC: 138 MG/DL (ref 0–150)
WBC # BLD AUTO: 7.2 K/UL (ref 4.3–11.1)

## 2024-10-08 PROCEDURE — 6360000002 HC RX W HCPCS

## 2024-10-08 PROCEDURE — 97165 OT EVAL LOW COMPLEX 30 MIN: CPT

## 2024-10-08 PROCEDURE — 2500000003 HC RX 250 WO HCPCS: Performed by: INTERNAL MEDICINE

## 2024-10-08 PROCEDURE — 83735 ASSAY OF MAGNESIUM: CPT

## 2024-10-08 PROCEDURE — 97530 THERAPEUTIC ACTIVITIES: CPT

## 2024-10-08 PROCEDURE — 97161 PT EVAL LOW COMPLEX 20 MIN: CPT

## 2024-10-08 PROCEDURE — 6370000000 HC RX 637 (ALT 250 FOR IP): Performed by: SURGERY

## 2024-10-08 PROCEDURE — 6360000002 HC RX W HCPCS: Performed by: SURGERY

## 2024-10-08 PROCEDURE — 84100 ASSAY OF PHOSPHORUS: CPT

## 2024-10-08 PROCEDURE — 2580000003 HC RX 258: Performed by: SURGERY

## 2024-10-08 PROCEDURE — 2580000003 HC RX 258: Performed by: INTERNAL MEDICINE

## 2024-10-08 PROCEDURE — 80048 BASIC METABOLIC PNL TOTAL CA: CPT

## 2024-10-08 PROCEDURE — 2700000000 HC OXYGEN THERAPY PER DAY

## 2024-10-08 PROCEDURE — 97112 NEUROMUSCULAR REEDUCATION: CPT

## 2024-10-08 PROCEDURE — 6360000002 HC RX W HCPCS: Performed by: INTERNAL MEDICINE

## 2024-10-08 PROCEDURE — 97535 SELF CARE MNGMENT TRAINING: CPT

## 2024-10-08 PROCEDURE — 94760 N-INVAS EAR/PLS OXIMETRY 1: CPT

## 2024-10-08 PROCEDURE — 2500000003 HC RX 250 WO HCPCS: Performed by: EMERGENCY MEDICINE

## 2024-10-08 PROCEDURE — 99232 SBSQ HOSP IP/OBS MODERATE 35: CPT | Performed by: INTERNAL MEDICINE

## 2024-10-08 PROCEDURE — 2580000003 HC RX 258: Performed by: EMERGENCY MEDICINE

## 2024-10-08 PROCEDURE — 82962 GLUCOSE BLOOD TEST: CPT

## 2024-10-08 PROCEDURE — 1100000003 HC PRIVATE W/ TELEMETRY

## 2024-10-08 PROCEDURE — 84478 ASSAY OF TRIGLYCERIDES: CPT

## 2024-10-08 PROCEDURE — 36592 COLLECT BLOOD FROM PICC: CPT

## 2024-10-08 PROCEDURE — 85025 COMPLETE CBC W/AUTO DIFF WBC: CPT

## 2024-10-08 PROCEDURE — 2500000003 HC RX 250 WO HCPCS: Performed by: SURGERY

## 2024-10-08 RX ORDER — INSULIN LISPRO 100 [IU]/ML
0-4 INJECTION, SOLUTION INTRAVENOUS; SUBCUTANEOUS EVERY 6 HOURS
Status: DISCONTINUED | OUTPATIENT
Start: 2024-10-08 | End: 2024-10-10

## 2024-10-08 RX ADMIN — ACETAMINOPHEN 650 MG: 325 TABLET ORAL at 12:13

## 2024-10-08 RX ADMIN — ONDANSETRON 4 MG: 2 INJECTION INTRAMUSCULAR; INTRAVENOUS at 04:19

## 2024-10-08 RX ADMIN — HYDRALAZINE HYDROCHLORIDE 5 MG: 20 INJECTION INTRAMUSCULAR; INTRAVENOUS at 09:40

## 2024-10-08 RX ADMIN — POTASSIUM CHLORIDE 20 MEQ: 400 INJECTION, SOLUTION INTRAVENOUS at 08:56

## 2024-10-08 RX ADMIN — CALCIUM GLUCONATE: 98 INJECTION, SOLUTION INTRAVENOUS at 17:46

## 2024-10-08 RX ADMIN — PIPERACILLIN AND TAZOBACTAM 3375 MG: 3; .375 INJECTION, POWDER, LYOPHILIZED, FOR SOLUTION INTRAVENOUS at 04:47

## 2024-10-08 RX ADMIN — METOCLOPRAMIDE 5 MG: 5 INJECTION, SOLUTION INTRAMUSCULAR; INTRAVENOUS at 12:16

## 2024-10-08 RX ADMIN — PIPERACILLIN AND TAZOBACTAM 3375 MG: 3; .375 INJECTION, POWDER, LYOPHILIZED, FOR SOLUTION INTRAVENOUS at 21:10

## 2024-10-08 RX ADMIN — PIPERACILLIN AND TAZOBACTAM 3375 MG: 3; .375 INJECTION, POWDER, LYOPHILIZED, FOR SOLUTION INTRAVENOUS at 12:16

## 2024-10-08 RX ADMIN — MORPHINE SULFATE 1 MG: 2 INJECTION, SOLUTION INTRAMUSCULAR; INTRAVENOUS at 08:36

## 2024-10-08 RX ADMIN — PANTOPRAZOLE SODIUM 40 MG: 40 INJECTION, POWDER, FOR SOLUTION INTRAVENOUS at 08:36

## 2024-10-08 RX ADMIN — POTASSIUM CHLORIDE 20 MEQ: 400 INJECTION, SOLUTION INTRAVENOUS at 05:57

## 2024-10-08 RX ADMIN — I.V. FAT EMULSION 250 ML: 20 EMULSION INTRAVENOUS at 17:49

## 2024-10-08 RX ADMIN — MORPHINE SULFATE 4 MG: 2 INJECTION, SOLUTION INTRAMUSCULAR; INTRAVENOUS at 04:19

## 2024-10-08 RX ADMIN — METOCLOPRAMIDE 5 MG: 5 INJECTION, SOLUTION INTRAMUSCULAR; INTRAVENOUS at 05:58

## 2024-10-08 RX ADMIN — METOCLOPRAMIDE 5 MG: 5 INJECTION, SOLUTION INTRAMUSCULAR; INTRAVENOUS at 17:40

## 2024-10-08 RX ADMIN — POTASSIUM CHLORIDE 20 MEQ: 400 INJECTION, SOLUTION INTRAVENOUS at 04:56

## 2024-10-08 RX ADMIN — SODIUM CHLORIDE, PRESERVATIVE FREE 10 ML: 5 INJECTION INTRAVENOUS at 08:47

## 2024-10-08 RX ADMIN — SODIUM PHOSPHATE, MONOBASIC, MONOHYDRATE AND SODIUM PHOSPHATE, DIBASIC, ANHYDROUS 15 MMOL: 276; 142 INJECTION, SOLUTION INTRAVENOUS at 01:05

## 2024-10-08 ASSESSMENT — PAIN SCALES - GENERAL
PAINLEVEL_OUTOF10: 0
PAINLEVEL_OUTOF10: 6
PAINLEVEL_OUTOF10: 0
PAINLEVEL_OUTOF10: 7
PAINLEVEL_OUTOF10: 0

## 2024-10-08 ASSESSMENT — PAIN DESCRIPTION - FREQUENCY: FREQUENCY: INTERMITTENT

## 2024-10-08 ASSESSMENT — PAIN DESCRIPTION - ORIENTATION: ORIENTATION: ANTERIOR

## 2024-10-08 ASSESSMENT — PAIN DESCRIPTION - LOCATION
LOCATION: ABDOMEN
LOCATION: ABDOMEN

## 2024-10-08 ASSESSMENT — PAIN DESCRIPTION - DESCRIPTORS: DESCRIPTORS: CRAMPING

## 2024-10-08 ASSESSMENT — PAIN DESCRIPTION - ONSET: ONSET: ON-GOING

## 2024-10-08 ASSESSMENT — PAIN - FUNCTIONAL ASSESSMENT: PAIN_FUNCTIONAL_ASSESSMENT: PREVENTS OR INTERFERES SOME ACTIVE ACTIVITIES AND ADLS

## 2024-10-08 ASSESSMENT — PAIN DESCRIPTION - PAIN TYPE: TYPE: SURGICAL PAIN

## 2024-10-08 NOTE — PROGRESS NOTES
Comprehensive Nutrition Assessment    Type and Reason for Visit: Reassess  Parenteral Nutrition Management (Pulmonology)    Nutrition Recommendations/Plan:   Parenteral Nutrition:  Central parenteral nutrition    Central line infusion  Continue: Dex 5%, 4.25% AA 2 L (85ml/hr)   Continue 250 ml 20% lipids daily  To provide: 1180 kcal/day (80% of needs), 85 grams of protein/day (100% of needs), 100 grams of CHO/day and ~2250 ml of total volume/day.   Above regimen: Initiation regimen not intended to meet macronutrient needs  Electrolytes/L:   Sodium ( 75 meq NaCl), Potassium ( 30 meq KCl) Phosphorus ( 15 mmol KPO4), Calcium (4.5 meq), Magnesium 10 meq   Additives per bag:   MVI, MTE  Thiamine 100 mg daily x 7 days (EOT 10/13)  Folic Acid 1 mg daily x 7 days (EOT 10/13)  Nutrition Related Medication Management:  Electrolyte Replacement Protocol PRN Continue for Magnesium, Potassium, and Phosphorus  IVF:  Discontinue at 1800  Labs:   BMP daily  Mg daily x 7 days at initiation then MWF  Phos daily x 7 days at initiation then MWF    Triglyceride weekly on Tuesday  Hepatic Panel weekly on Tuesdays  POC Glucoses/SSI Change frequency q 6 hours  Ionized Ca     Malnutrition Assessment:  Malnutrition Status: At risk for malnutrition (Comment) (S/p open abdominal surgery x2, NPO)  C  Mild temporal wasting  Nutrition Assessment:  Nutrition History: Daughter at bedside provides history. Usual intake is 2 meals per day. No changes prior to acute events. Symptoms started day of admission.         Weight History: Very limited. Last office visit 195# on 9/1/23. Daughter reports minimal weight changes.  Nutrition Background:       PMH: HTN, hypothyroidism. Presented with abdominal pain and N/V. CT with SBO and hiatal hernia. Small bowel resection with abthera wound dressing and wound vac 10/4. Returned to OR 10/6 for additional small bowel removal and closure of abdominal wound.   Nutrition Interval:  PICC placed 10/5. TPN started

## 2024-10-08 NOTE — PROGRESS NOTES
Range    Phosphorus 2.8 2.5 - 4.5 MG/DL   Basic Metabolic Panel    Collection Time: 10/08/24  3:39 AM   Result Value Ref Range    Sodium 138 136 - 145 mmol/L    Potassium 3.0 (L) 3.5 - 5.1 mmol/L    Chloride 102 98 - 107 mmol/L    CO2 28 20 - 28 mmol/L    Anion Gap 8 (L) 9 - 18 mmol/L    Glucose 105 (H) 70 - 99 mg/dL    BUN 10 8 - 23 MG/DL    Creatinine 0.62 0.60 - 1.10 MG/DL    Est, Glom Filt Rate >90 >60 ml/min/1.73m2    Calcium 7.7 (L) 8.8 - 10.2 MG/DL   Magnesium    Collection Time: 10/08/24  3:39 AM   Result Value Ref Range    Magnesium 1.9 1.8 - 2.4 mg/dL   Triglyceride    Collection Time: 10/08/24  3:39 AM   Result Value Ref Range    Triglycerides 138 0 - 150 MG/DL   POCT Glucose    Collection Time: 10/08/24  8:50 AM   Result Value Ref Range    POC Glucose 102 (H) 65 - 100 mg/dL    Performed by: Disha          Assessment:     Principal Problem:    Small bowel obstruction (HCC)  Active Problems:    HTN (hypertension)    Hypothyroidism    Leukocytosis    Ischemia, bowel (HCC)    Septic shock (HCC)    Encounter for weaning from ventilator (HCC)    Ischemic bowel disease (HCC)    SBO (small bowel obstruction) (HCC)    Pleural effusion  Resolved Problems:    * No resolved hospital problems. *        Plan/Recommendations/Medical Decision Making:     PLAN    Awaiting return of bowel function (no flatus.  On right IV Reglan since 10/7/2024)  Continue n.p.o. NGT LIS  Continue TPN  WBC 7.2 on Zosyn  PT/OT eval and treat    CLAIRE Tapia - NP

## 2024-10-08 NOTE — PROGRESS NOTES
ACUTE OCCUPATIONAL THERAPY GOALS:   (Developed with and agreed upon by patient and/or caregiver.)  1. Patient will complete upper body bathing and dressing with supervision and adaptive equipment as needed.   2. Patient will complete toileting with minimal assistance.   3. Patient will tolerate 30 minutes of OT treatment with 1-2 rest breaks to increase activity tolerance for ADLs.   4. Patient will complete functional transfers with minimal assistance and adaptive equipment as needed.   5. Patient will stand sink side for grooming tasks with CGA to improve standing tolerance for ADL.  6. Patient will complete functional mobility with minimal assistance and good safety awareness.     Timeframe: 7 visits       OCCUPATIONAL THERAPY Initial Assessment, Daily Note, and PM       OT Visit Days: 1  Acknowledge Orders  Time  OT Charge Capture  Rehab Caseload Tracker      Estefanía Hope is a 73 y.o. female   PRIMARY DIAGNOSIS: Small bowel obstruction (HCC)  SBO (small bowel obstruction) (HCC) [K56.609]  Procedure(s) (LRB):  EXPLORATORY LAPAROTOMY, REMOVAL OF ABTHERA WOUND DRESSING, RESECTION OF SMALL BOWEL AND SMALL BOWEL ANASTOMOSIS (N/A)  2 Days Post-Op  Reason for Referral: Generalized Muscle Weakness (M62.81)  Other lack of cordination (R27.8)  History of falling (Z91.81)  Inpatient: Payor: MEDICARE / Plan: MEDICARE PART A AND B / Product Type: *No Product type* /     ASSESSMENT:     REHAB RECOMMENDATIONS:   Recommendation to date pending progress:  Setting:  Inpatient Rehab Facility     Equipment:    To Be Determined     ASSESSMENT:  Ms. Hope presents to the hospital with SBO and is s/p exploratory lap with resection of the small bowel. Pt denies any current pain at rest. Pt has supportive daughter and sisters at bedside. Pt lives in [place] with her daughter but they were here visiting the patient's sisters. Pt educated on completing log roll to decrease pain with supine to sit and needed moderate assistance x 2.         Vitals          Oxygen            GROSS EVALUATION: INTACT IMPAIRED   (See Comments)   UE AROM [] []Generally decreased; hx of R wrist fx    UE PROM [] []   Strength []  Generally decreased; hx of weakness in R hand from wrist fx ~ 1 year ago     Posture / Balance []  Fair+ to fair sitting; fair- standing    Sensation []     Coordination []  Slowed      Tone []       Edema []    Activity Tolerance []  Limited by pain/weakness/fatigue      Hand Dominance R [] L []      COGNITION/  PERCEPTION: INTACT IMPAIRED   (See Comments)   Orientation []  Appears appopriate    Vision [x]     Hearing [x]     Cognition  [x]     Perception [x]       MOBILITY: I Mod I S SBA CGA Min Mod Max Total  NT x2 Comments:   Bed Mobility    Rolling [] [] [] [] [] [] [x] [] [] [] [x]    Supine to Sit [] [] [] [] [] [] [x] [] [] [] [x]    Scooting [] [] [] [] [] [] [x] [] [] [] [x]    Sit to Supine [] [] [] [] [] [] [] [x] [] [] [x]    Transfers    Sit to Stand [] [] [] [] [] [] [x] [] [] [] [x]    Bed to Chair [] [] [] [] [] [] [] [] [] [] []    Stand to Sit [] [] [] [] [] [] [x] [] [] [] [x]    Tub/Shower [] [] [] [] [] [] [] [] [] [] []     Toilet [] [] [] [] [] [] [] [] [] [] []      [] [] [] [] [] [] [] [] [] [] []    I=Independent, Mod I=Modified Independent, S=Supervision/Setup, SBA=Standby Assistance, CGA=Contact Guard Assistance, Min=Minimal Assistance, Mod=Moderate Assistance, Max=Maximal Assistance, Total=Total Assistance, NT=Not Tested    ACTIVITIES OF DAILY LIVING: I Mod I S SBA CGA Min Mod Max Total NT Comments   BASIC ADLs:              Upper Body Bathing  [] [] [] [] [] [] [] [] [] []     Lower Body Bathing [] [] [] [] [] [] [] [] [] []     Toileting [] [] [] [] [] [] [] [] [] []    Upper Body Dressing [] [] [] [] [] [] [] [] [] []    Lower Body Dressing [] [] [] [] [] [] [] [] [] []    Feeding [] [] [] [] [] [] [] [] [] []    Grooming [] [] [] [] [] [] [x] [] [] [] Brushing/combing hair sitting edge of bed   Personal

## 2024-10-08 NOTE — PROGRESS NOTES
ACUTE PHYSICAL THERAPY GOALS:   (Developed with and agreed upon by patient and/or caregiver.)    (1.) Estefanía Hope  will move from supine to sit and sit to supine  with MINIMAL ASSIST within 7 treatment day(s).    (2.) Estefanía Hope will transfer from bed to chair and chair to bed with MINIMAL ASSIST using the least restrictive device within 7 treatment day(s).    (3.) Estefanía Hope will ambulate with MINIMAL ASSIST for 50 feet with the least restrictive device within 7 treatment day(s).   (4.) Estefanía Hope will perform standing static and dynamic balance activities x 10 minutes with MINIMAL ASSIST to improve safety within 7 treatment day(s).  (5.) Estefanía Hope will ascend and descend 1 stairs using no hand rail(s) with MINIMAL ASSIST to improve functional mobility and safety within 7 treatment day(s).  (6.) Estefanía Hope will perform therapeutic exercises x 10 min for HEP with SUPERVISION to improve strength, endurance, and functional mobility within 7 treatment day(s).      PHYSICAL THERAPY Initial Assessment, Daily Note, and PM  (Link to Caseload Tracking: PT Visit Days : 1  Acknowledge Orders  Time In/Out  PT Charge Capture  Rehab Caseload Tracker    Estefanía Hoep is a 73 y.o. female   PRIMARY DIAGNOSIS: Small bowel obstruction (HCC)  SBO (small bowel obstruction) (HCC) [K56.609]  Procedure(s) (LRB):  EXPLORATORY LAPAROTOMY, REMOVAL OF ABTHERA WOUND DRESSING, RESECTION OF SMALL BOWEL AND SMALL BOWEL ANASTOMOSIS (N/A)  2 Days Post-Op  Reason for Referral: Generalized Muscle Weakness (M62.81)  Difficulty in walking, Not elsewhere classified (R26.2)  Inpatient: Payor: MEDICARE / Plan: MEDICARE PART A AND B / Product Type: *No Product type* /     ASSESSMENT:     REHAB RECOMMENDATIONS:   Recommendation to date pending progress:  Setting:  Inpatient Rehab Facility    Equipment:    To Be Determined     ASSESSMENT:  Ms. Hope is a 73 year old female who presents to ICU

## 2024-10-08 NOTE — INTERDISCIPLINARY ROUNDS
"  Pelvic Health Physical Therapy   Treatment Note     Name: Daksha Nettles  Clinic Number: [id number]    Therapy Diagnosis:   Encounter Diagnoses   Name Primary?    Pelvic floor dysfunction Yes    Other urinary incontinence      Physician: Christopher Cody MD    Visit Date: 7/5/2022    Physician Orders: PT Eval and Treat   Medical Diagnosis from Referral: Pelvic floor dysfunction [M62.89]  Evaluation Date: 4/29/2022  Authorization Period Expiration: 12/30/2022  Plan of Care Expiration: 10/29/2022  Visit # / Visits authorized: 3 total      Time In: 11:05  Time Out: 12:00  Total Appointment Time (timed & untimed codes): 55 minutes     Precautions: standard   Mom = Fahad     Subjective     Pt reports: had [facility], liked Dr. Matson. Switched her from Miralax to Senna, she's been on it for 2 weeks and at one point her stools got really hard and painful to have a bowel movement so mom has been giving her Miralax occasionally and that has improved everything. At [facility] they determined she was constipated again so they did another cleanout. They're supposed to do "toilet time" after meals and Lizzette taught them how to do the colon massage (has not been doing as much this last week because mom has been working more). Accidents have been better - determined that a lot of her fecal incontinence was due to not wiping well; she did have a small incident of fecal incontinence yesterday while she was at her aunt's house and playing with her cousins, likely waited too long to go because she wanted to keep playing.   Mom also noted that when they were just on Senna she was having less bed-wetting, likely due to not having an 8 oz glass of water right before bed.      She was compliant with home exercise program.  Response to previous treatment: tolerated well   Functional change: decreased accidents; improving ability to empty bowels.     Pain: 0/10  Location: not applicable     Objective       Daksha received the following manual therapy " Multi-D Rounds/Checklist (leapfrog):  Lines: can any be removed?: None    NG/OG/NJ/NE Tube Nasogastric Right nostril (Active)       Urinary Catheter 10/04/24 (Active)     PICC 10/05/24 Right Basilic (Active)     DVT Prophylaxis: Ordered  Vent: N/A  Nutrition Ordered/appropriate: Contraindicated- surgery  Can antibiotics or other drugs be stopped? Yes/End Date set Yes/No  Inpat Anti-Infectives (From admission, onward)       Start     Ordered Stop    10/05/24 0500  linezolid (ZYVOX) IVPB 600 mg  600 mg,   IntraVENous,   EVERY 12 HOURS         10/04/24 2100 --    10/04/24 2015  piperacillin-tazobactam (ZOSYN) 3,375 mg in sodium chloride 0.9 % 50 mL IVPB (mini-bag)  3,375 mg,   IntraVENous,   EVERY 8 HOURS        Placed in \"Followed by\" Linked Group    10/04/24 0824 --                  Consults needed: None  A: Is pain control adequate? (has PRNs? Stop drip?) Orders adjusted--adjust per surgery  B: Sedation break and SBT? N/A  C: Is sedation choice appropriate? N/A  D: Delirium/CAM-ICU? No  E: Mobility goals/appropriateness? Yes  F: Family update and plan? friend is surrogate decision maker and is being updated daily by primary attending and nursing staff.    Nicci Lizama, APRN - CNP    techniques: to develop desensitization for 45 minutes including:  Myofascial release of abdominal wall; colon massage    Daksha participated in neuromuscular re-education activities to develop Coordination, Control and Down training for 10 minutes including:  Exhale with eccentric transverse abdominis (use of bubbles) for instruction on bearing down    NOT TODAY RUSI for breathing, drops, contractions of the PFM to help pt visualize PFM motion and function.          Home Exercises Provided and Patient Education Provided     Education provided:   - anatomy/physiology of pelvic floor, diaphragmatic breathing, hygiene of perineum and behavior modifications  Discussed progression of plan of care with patient; educated pt in activity modification; reviewed HEP with pt. Pt demonstrated and verbalized understanding of all instruction and was provided with a handout of HEP (see Patient Instructions).      Written Home Exercises Provided: Patient instructed to cont prior HEP.  Exercises were reviewed and Daksha was able to demonstrate them prior to the end of the session.  Daksha demonstrated good  understanding of the education provided.     See EMR under Patient Instructions for exercises provided prior visit.    Assessment     Able to perform colon massage and soft tissue mobilization of abdominal wall with minimal intermittent guarding from patient, able to relax with verbal cues from physical therapist. Reviewed bearing down procedure with aid of bubbles - patient has difficulty maintaining eccentric transverse abdominis approx 50% of the time.   Daksha Is progressing well towards her goals.   Pt prognosis is Good.     Pt will continue to benefit from skilled outpatient physical therapy to address the deficits listed in the problem list box on initial evaluation, provide pt/family education and to maximize pt's level of independence in the home and community environment.     Pt's spiritual, cultural and educational needs  considered and pt agreeable to plan of care and goals.     Anticipated Barriers for therapy: long commute (1.5 hrs) to PT sessions.      Goals:  Short Term Goals: 3 months  - Pt will demonstrate excellent knowledge and adherence to HEP to facilitate optimal recovery. (PROGRESING)  - Pt will demonstrate appropriate diaphragmatic breathing technique to prevent adverse affects to adjacent structures. (PROGRESING)        Long Term Goals: 6 months   - Pt will demonstrate excellent knowledge and adherence to HEP for continued self-maintenance of symptoms.(PROGRESING)  - Pt will report voiding interval of 2-3 hours for improved ADL tolerance.(PROGRESING)  - Pt will be able to delay urinary urge at least 15 minutes for improved ADL/iADL tolerance. (PROGRESING)  - Pt will demonstrate independence with vulvar hygiene to reduce risk of UTI. (PROGRESING)  - Pt will report little (drops) to no urinary or fecal leakage 6/7 days for improved hygiene and ADL tolerance. (PROGRESING)  - Pt will report bearing down appropriately 100% of the time for improved bowel function and decreased stress on adjacent pelvic structures. (PROGRESING)    Plan     Continue with manual intervention, down-training, neuro re-edu     Rosita Rios, PT

## 2024-10-08 NOTE — PROGRESS NOTES
Paul Snow/[facility] Critical Care Note:: 10/8/2024  Estefanía Hope  Admission Date: 10/3/2024     Length of Stay: 5 days    Background: 73 y.o. female with need for Ventilatory Support after have surgery for ischemic bowel.     Per notes has HTN/Hypothyroidism and came in with abdominal pain with N/V. CT noted SBO with hiatal hernia. Patient also with WBC in 20's and taken to OR on 10/4. Note to have Ischemic bowel with Small bowel resection with abthera wound dressing and vac applied.   Back to ICU 10/6 for additional small bowel resection. Extubated 10/6.       Notable PMH:  has a past medical history of Anemia, Arrhythmia, Arthritis, Asthma, Chronic pain, Gastrointestinal disorder, GERD (gastroesophageal reflux disease), High cholesterol, Hypertension, Morbid obesity, Nausea & vomiting, Other ill-defined conditions(799.89), Psychiatric disorder, and Thyroid disease.    24 Hour events:   Pt remains extubated. Currently on room air.     On TPN.   C/o expected level of abdominal pain but no new issues.   No nausea.     Review of Systems: Comprehensive ROS negative except in HPI    Lines: (insertion date)    NG/OG/NJ/NE Tube Nasogastric Right nostril (Active)       Urinary Catheter 10/04/24 (Active)     PICC 10/05/24 Right Basilic (Active)     Drips: current dose (range)  Dose (mg/hr) Midazolam: 0 mg/hr  Dose (mcg/kg/min) Propofol : *50 mg  Dose (mcg/hr) Fentanyl: 0 mcg/hr (not running on assessment)  Dose (mcg/min) Norepinephrine: 0 mcg/min  Dose (mcg/min) Phenylephrine : *200 mcg     Pertinent Exam:         Blood pressure (!) 157/70, pulse 85, temperature 99.8 °F (37.7 °C), temperature source Axillary, resp. rate 17, height 1.676 m (5' 6\"), weight 93.8 kg (206 lb 12.7 oz), SpO2 97%.   Intake/Output Summary (Last 24 hours) at 10/8/2024 0732  Last data filed at 10/8/2024 0626  Gross per 24 hour   Intake 3496.27 ml   Output 3200 ml   Net 296.27 ml     Constitutional: NAD  EENMT:  Sclera clear, pupils equal,

## 2024-10-09 ENCOUNTER — APPOINTMENT (OUTPATIENT)
Dept: GENERAL RADIOLOGY | Age: 74
End: 2024-10-09
Payer: MEDICARE

## 2024-10-09 LAB
ANION GAP SERPL CALC-SCNC: 8 MMOL/L (ref 9–18)
BASOPHILS # BLD: 0 K/UL (ref 0–0.2)
BASOPHILS NFR BLD: 0 % (ref 0–2)
BUN SERPL-MCNC: 14 MG/DL (ref 8–23)
CA-I BLD-MCNC: 4.37 MG/DL (ref 4–5.2)
CALCIUM SERPL-MCNC: 8.1 MG/DL (ref 8.8–10.2)
CHLORIDE SERPL-SCNC: 103 MMOL/L (ref 98–107)
CO2 SERPL-SCNC: 28 MMOL/L (ref 20–28)
CREAT SERPL-MCNC: 0.62 MG/DL (ref 0.6–1.1)
DIFFERENTIAL METHOD BLD: ABNORMAL
EOSINOPHIL # BLD: 0.2 K/UL (ref 0–0.8)
EOSINOPHIL NFR BLD: 3 % (ref 0.5–7.8)
ERYTHROCYTE [DISTWIDTH] IN BLOOD BY AUTOMATED COUNT: 12.3 % (ref 11.9–14.6)
GLUCOSE BLD STRIP.AUTO-MCNC: 127 MG/DL (ref 65–100)
GLUCOSE BLD STRIP.AUTO-MCNC: 128 MG/DL (ref 65–100)
GLUCOSE BLD STRIP.AUTO-MCNC: 128 MG/DL (ref 65–100)
GLUCOSE BLD STRIP.AUTO-MCNC: 131 MG/DL (ref 65–100)
GLUCOSE SERPL-MCNC: 132 MG/DL (ref 70–99)
HCT VFR BLD AUTO: 29.1 % (ref 35.8–46.3)
HGB BLD-MCNC: 9.3 G/DL (ref 11.7–15.4)
IMM GRANULOCYTES # BLD AUTO: 0.1 K/UL (ref 0–0.5)
IMM GRANULOCYTES NFR BLD AUTO: 1 % (ref 0–5)
LYMPHOCYTES # BLD: 1 K/UL (ref 0.5–4.6)
LYMPHOCYTES NFR BLD: 16 % (ref 13–44)
MAGNESIUM SERPL-MCNC: 2 MG/DL (ref 1.8–2.4)
MCH RBC QN AUTO: 32 PG (ref 26.1–32.9)
MCHC RBC AUTO-ENTMCNC: 32 G/DL (ref 31.4–35)
MCV RBC AUTO: 100 FL (ref 82–102)
MONOCYTES # BLD: 0.6 K/UL (ref 0.1–1.3)
MONOCYTES NFR BLD: 9 % (ref 4–12)
NEUTS SEG # BLD: 4.5 K/UL (ref 1.7–8.2)
NEUTS SEG NFR BLD: 71 % (ref 43–78)
NRBC # BLD: 0 K/UL (ref 0–0.2)
PHOSPHATE SERPL-MCNC: 2.4 MG/DL (ref 2.5–4.5)
PLATELET # BLD AUTO: 148 K/UL (ref 150–450)
PMV BLD AUTO: 9.7 FL (ref 9.4–12.3)
POTASSIUM SERPL-SCNC: 3.7 MMOL/L (ref 3.5–5.1)
RBC # BLD AUTO: 2.91 M/UL (ref 4.05–5.2)
SERVICE CMNT-IMP: ABNORMAL
SODIUM SERPL-SCNC: 139 MMOL/L (ref 136–145)
WBC # BLD AUTO: 6.3 K/UL (ref 4.3–11.1)

## 2024-10-09 PROCEDURE — 2580000003 HC RX 258: Performed by: SURGERY

## 2024-10-09 PROCEDURE — 6360000002 HC RX W HCPCS: Performed by: INTERNAL MEDICINE

## 2024-10-09 PROCEDURE — 85025 COMPLETE CBC W/AUTO DIFF WBC: CPT

## 2024-10-09 PROCEDURE — 82962 GLUCOSE BLOOD TEST: CPT

## 2024-10-09 PROCEDURE — 2500000003 HC RX 250 WO HCPCS: Performed by: SURGERY

## 2024-10-09 PROCEDURE — 2500000003 HC RX 250 WO HCPCS: Performed by: INTERNAL MEDICINE

## 2024-10-09 PROCEDURE — 99222 1ST HOSP IP/OBS MODERATE 55: CPT | Performed by: PHYSICAL MEDICINE & REHABILITATION

## 2024-10-09 PROCEDURE — 2700000000 HC OXYGEN THERAPY PER DAY

## 2024-10-09 PROCEDURE — 6360000002 HC RX W HCPCS: Performed by: SURGERY

## 2024-10-09 PROCEDURE — 1100000003 HC PRIVATE W/ TELEMETRY

## 2024-10-09 PROCEDURE — 2580000003 HC RX 258: Performed by: INTERNAL MEDICINE

## 2024-10-09 PROCEDURE — 80048 BASIC METABOLIC PNL TOTAL CA: CPT

## 2024-10-09 PROCEDURE — 84100 ASSAY OF PHOSPHORUS: CPT

## 2024-10-09 PROCEDURE — 6360000002 HC RX W HCPCS: Performed by: STUDENT IN AN ORGANIZED HEALTH CARE EDUCATION/TRAINING PROGRAM

## 2024-10-09 PROCEDURE — 94760 N-INVAS EAR/PLS OXIMETRY 1: CPT

## 2024-10-09 PROCEDURE — 83735 ASSAY OF MAGNESIUM: CPT

## 2024-10-09 PROCEDURE — 36415 COLL VENOUS BLD VENIPUNCTURE: CPT

## 2024-10-09 PROCEDURE — 74018 RADEX ABDOMEN 1 VIEW: CPT

## 2024-10-09 PROCEDURE — 6370000000 HC RX 637 (ALT 250 FOR IP): Performed by: INTERNAL MEDICINE

## 2024-10-09 PROCEDURE — 6360000002 HC RX W HCPCS

## 2024-10-09 PROCEDURE — 82330 ASSAY OF CALCIUM: CPT

## 2024-10-09 RX ORDER — METOCLOPRAMIDE HYDROCHLORIDE 5 MG/ML
10 INJECTION INTRAMUSCULAR; INTRAVENOUS EVERY 6 HOURS
Status: COMPLETED | OUTPATIENT
Start: 2024-10-09 | End: 2024-10-10

## 2024-10-09 RX ORDER — HYDRALAZINE HYDROCHLORIDE 20 MG/ML
10 INJECTION INTRAMUSCULAR; INTRAVENOUS EVERY 6 HOURS PRN
Status: DISCONTINUED | OUTPATIENT
Start: 2024-10-09 | End: 2024-10-11 | Stop reason: HOSPADM

## 2024-10-09 RX ORDER — ATORVASTATIN CALCIUM 40 MG/1
40 TABLET, FILM COATED ORAL NIGHTLY
Status: DISCONTINUED | OUTPATIENT
Start: 2024-10-10 | End: 2024-10-11 | Stop reason: HOSPADM

## 2024-10-09 RX ADMIN — PANTOPRAZOLE SODIUM 40 MG: 40 INJECTION, POWDER, FOR SOLUTION INTRAVENOUS at 10:08

## 2024-10-09 RX ADMIN — PIPERACILLIN AND TAZOBACTAM 3375 MG: 3; .375 INJECTION, POWDER, LYOPHILIZED, FOR SOLUTION INTRAVENOUS at 04:18

## 2024-10-09 RX ADMIN — FERROUS SULFATE TAB 325 MG (65 MG ELEMENTAL FE) 325 MG: 325 (65 FE) TAB at 10:21

## 2024-10-09 RX ADMIN — METOCLOPRAMIDE HYDROCHLORIDE 10 MG: 5 INJECTION INTRAMUSCULAR; INTRAVENOUS at 23:08

## 2024-10-09 RX ADMIN — METOCLOPRAMIDE 5 MG: 5 INJECTION, SOLUTION INTRAMUSCULAR; INTRAVENOUS at 00:54

## 2024-10-09 RX ADMIN — METOCLOPRAMIDE 5 MG: 5 INJECTION, SOLUTION INTRAMUSCULAR; INTRAVENOUS at 10:23

## 2024-10-09 RX ADMIN — SODIUM PHOSPHATE, MONOBASIC, MONOHYDRATE AND SODIUM PHOSPHATE, DIBASIC, ANHYDROUS 15 MMOL: 276; 142 INJECTION, SOLUTION INTRAVENOUS at 15:58

## 2024-10-09 RX ADMIN — PIPERACILLIN AND TAZOBACTAM 3375 MG: 3; .375 INJECTION, POWDER, LYOPHILIZED, FOR SOLUTION INTRAVENOUS at 21:20

## 2024-10-09 RX ADMIN — PIPERACILLIN AND TAZOBACTAM 3375 MG: 3; .375 INJECTION, POWDER, LYOPHILIZED, FOR SOLUTION INTRAVENOUS at 10:27

## 2024-10-09 RX ADMIN — CALCIUM GLUCONATE: 98 INJECTION, SOLUTION INTRAVENOUS at 17:14

## 2024-10-09 RX ADMIN — SODIUM CHLORIDE, PRESERVATIVE FREE 10 ML: 5 INJECTION INTRAVENOUS at 10:25

## 2024-10-09 RX ADMIN — SODIUM CHLORIDE, PRESERVATIVE FREE 10 ML: 5 INJECTION INTRAVENOUS at 21:20

## 2024-10-09 RX ADMIN — I.V. FAT EMULSION 250 ML: 20 EMULSION INTRAVENOUS at 17:20

## 2024-10-09 RX ADMIN — HYDRALAZINE HYDROCHLORIDE 10 MG: 20 INJECTION INTRAMUSCULAR; INTRAVENOUS at 10:21

## 2024-10-09 RX ADMIN — METOCLOPRAMIDE HYDROCHLORIDE 10 MG: 5 INJECTION INTRAMUSCULAR; INTRAVENOUS at 17:38

## 2024-10-09 RX ADMIN — SODIUM CHLORIDE: 9 INJECTION, SOLUTION INTRAVENOUS at 21:18

## 2024-10-09 RX ADMIN — METOCLOPRAMIDE 5 MG: 5 INJECTION, SOLUTION INTRAMUSCULAR; INTRAVENOUS at 06:46

## 2024-10-09 ASSESSMENT — PAIN SCALES - GENERAL: PAINLEVEL_OUTOF10: 0

## 2024-10-09 NOTE — PROGRESS NOTES
RRT Clinical Rounding Nurse Progress Report      SUBJECTIVE: Patient assessed secondary to transfer from critical care.      Vitals:    10/08/24 1545 10/08/24 1624 10/08/24 2059 10/08/24 2118   BP: (!) 154/70 (!) 148/78 (!) 161/79    Pulse: 84 90 86 87   Resp: 21 16  18   Temp:  98.6 °F (37 °C) 98.4 °F (36.9 °C)    TempSrc:  Oral     SpO2: 97% 97% 96% 96%   Weight:       Height:            ASSESSMENT:  Pt awake and sitting up in bed, family present at bedside. Pt with a little pain, primary RN made aware. TPN and lipids are running at this time. NGT to LWIS. Respirations are even and unlabored on 1L NC. No other needs from pt at this time.     PLAN:  Will follow per RRT Clinical Rounding Program protocol.    Kailey La RN  [place]: [phone]  [place]: [phone]

## 2024-10-09 NOTE — CONSULTS
transverse  colon. Small bowel obstruction in the left abdomen. Sigmoid diverticulosis. No  inflammatory changes.  - LYMPH NODES: No significant retroperitoneal, mesenteric, or pelvic adenopathy.  - BONES: Severe compression fracture of L1 vertebral body with retropulsion  causing narrowing of the spinal canal at this location.  - VASCULATURE: Atherosclerotic changes of the blood vessel with vascular  calcifications.  - OTHER: A small amount of free fluid in the abdomen and pelvis.    Impression  A large hiatal hernia containing the stomach and distal transverse colon. Small  bowel obstruction in the left abdomen. Sigmoid diverticulosis.    Small right kidney with a 1.5 cm exophytic right renal cyst.    Severe compression fracture of L1 vertebral body with retropulsion causing  narrowing of the spinal canal at this location.    A small amount of free fluid in the abdomen and pelvis.      If providers have any questions about this report, I can be reached on  [employer].      Electronically signed by TERRANCE CHOW       Xray Result (most recent):  XR CHEST LIMITED ONE VIEW 10/07/2024    Narrative  EXAMINATION: XR CHEST 1 VIEW    EXAM DATE: 10/7/2024 3:43 AM    INDICATION: HYPOXIA    COMPARISON: October 6, 2024    TECHNIQUE:  Single AP view of the chest.    FINDINGS:    The pulmonary vasculature appears prominent. Consolidative bibasilar pulmonary  opacities are present, greater on the right than the left. There is been  interval removal of the endotracheal tube. Possible moderate right and small  left pleural effusions.    The cardiac silhouette appears prominent, although exaggerated by AP technique.  There is a right upper extremity PICC line with the catheter tip in the superior  vena cava. An endotracheal tube is present coursing into the stomach with the  tip at the gastric antrum. A prominent hiatal hernia is noted.    No acute osseous or soft tissue abnormality. Surgical clips are noted in the  right upper  quadrant.    Impression  1.  Bibasilar consolidative pulmonary opacities with associated moderate right  and small left pleural effusions, possibly due to atelectasis, pulmonary edema  or pneumonia.    2.  Pulmonary vascular congestion.    3.  Satisfactory position of the enteric tube and PICC line.        Electronically signed by Angelia Lozano       Code status: Full Code

## 2024-10-09 NOTE — PLAN OF CARE
Problem: Discharge Planning  Goal: Discharge to home or other facility with appropriate resources  Outcome: Progressing     Problem: Safety - Adult  Goal: Free from fall injury  Outcome: Progressing     Problem: Skin/Tissue Integrity  Goal: Absence of new skin breakdown  Description: 1.  Monitor for areas of redness and/or skin breakdown  2.  Assess vascular access sites hourly  3.  Every 4-6 hours minimum:  Change oxygen saturation probe site  4.  Every 4-6 hours:  If on nasal continuous positive airway pressure, respiratory therapy assess nares and determine need for appliance change or resting period.  Outcome: Progressing     Problem: Safety - Medical Restraint  Goal: Remains free of injury from restraints (Restraint for Interference with Medical Device)  Description: INTERVENTIONS:  1. Determine that other, less restrictive measures have been tried or would not be effective before applying the restraint  2. Evaluate the patient's condition at the time of restraint application  3. Inform patient/family regarding the reason for restraint  4. Q2H: Monitor safety, psychosocial status, comfort, nutrition and hydration  Outcome: Progressing     Problem: Pain  Goal: Verbalizes/displays adequate comfort level or baseline comfort level  Outcome: Progressing     Problem: ABCDS Injury Assessment  Goal: Absence of physical injury  Outcome: Progressing

## 2024-10-09 NOTE — PLAN OF CARE
Problem: Skin/Tissue Integrity  Goal: Absence of new skin breakdown  Description: 1.  Monitor for areas of redness and/or skin breakdown  2.  Assess vascular access sites hourly  3.  Every 4-6 hours minimum:  Change oxygen saturation probe site  4.  Every 4-6 hours:  If on nasal continuous positive airway pressure, respiratory therapy assess nares and determine need for appliance change or resting period.  10/9/2024 1151 by Scarlett Chin RN  Outcome: Progressing  10/8/2024 2325 by Althea Anne RN  Outcome: Progressing     Problem: Safety - Medical Restraint  Goal: Remains free of injury from restraints (Restraint for Interference with Medical Device)  Description: INTERVENTIONS:  1. Determine that other, less restrictive measures have been tried or would not be effective before applying the restraint  2. Evaluate the patient's condition at the time of restraint application  3. Inform patient/family regarding the reason for restraint  4. Q2H: Monitor safety, psychosocial status, comfort, nutrition and hydration  10/8/2024 2325 by Althea Anne, RN  Outcome: Progressing

## 2024-10-09 NOTE — PROGRESS NOTES
Comprehensive Nutrition Assessment    Type and Reason for Visit: Reassess  Parenteral Nutrition Management (Pulmonology)    Nutrition Recommendations/Plan:   Parenteral Nutrition:  Central parenteral nutrition    Central line infusion  Advance: Dex 10% , 4.25% AA 2 L (85ml/hr)   Continue 250 ml 20% lipids daily  To provide: 1520 kcal/day (100% of needs), 85 grams of protein/day (100% of needs), 200 grams of CHO/day and ~2250 ml of total volume/day.   Above regimen: Intended to meet macronutrient goals  Electrolytes/L:   Sodium ( 75 meq NaCl), Potassium ( 30 meq KCl) Phosphorus ( 15 mmol KPO4), Calcium (4.5 meq), Magnesium 10 meq   Additives per bag:   MVI, MTE  Thiamine 100 mg daily x 7 days (EOT 10/13)  Folic Acid 1 mg daily x 7 days (EOT 10/13)  Nutrition Related Medication Management:  Electrolyte Replacement Protocol PRN Continue for Magnesium, Potassium, and Phosphorus  IVF:  N/A  Labs:   BMP daily  Mg daily x 7 days at initiation then MWF  Phos daily x 7 days at initiation then MWF    Triglyceride weekly on Tuesday  Hepatic Panel weekly on Tuesdays  POC Glucoses/SSI Change frequency q 6 hours     Malnutrition Assessment:  Malnutrition Status: At risk for malnutrition (Comment) (S/p open abdominal surgery x2, NPO)  C  Mild temporal wasting  Nutrition Assessment:  Nutrition History: Daughter at bedside provides history. Usual intake is 2 meals per day. No changes prior to acute events. Symptoms started day of admission.         Weight History: Very limited. Last office visit 195# on 9/1/23. Daughter reports minimal weight changes.  Nutrition Background:       PMH: HTN, hypothyroidism. Presented with abdominal pain and N/V. CT with SBO and hiatal hernia. Small bowel resection with abthera wound dressing and wound vac 10/4. Returned to OR 10/6 for additional small bowel removal and closure of abdominal wound.   Nutrition Interval:  PICC placed 10/5. TPN + lipids started 10/7.     Patient reclined in bed with

## 2024-10-09 NOTE — PROGRESS NOTES
4 Eyes Skin Assessment     NAME:  Estefanía Hope  YOB: 1950  MEDICAL RECORD NUMBER:  [id number]    The patient is being assessed for  Admission    I agree that at least one RN has performed a thorough Head to Toe Skin Assessment on the patient. ALL assessment sites listed below have been assessed.      Areas assessed by both nurses:    Head, Face, Ears, Shoulders, Back, Chest, Arms, Elbows, Hands, Sacrum. Buttock, Coccyx, Ischium, and Legs. Feet and Heels        Does the Patient have a Wound? No noted wound(s)       Demetris Prevention initiated by RN: No  Wound Care Orders initiated by RN: No    Pressure Injury (Stage 3,4, Unstageable, DTI, NWPT, and Complex wounds) if present, place Wound referral order by RN under : No    New Ostomies, if present place, Ostomy referral order under : No     Nurse 1 eSignature: Electronically signed by Preeti Olivares RN on 10/9/24 at 2:43 AM EDT    **SHARE this note so that the co-signing nurse can place an eSignature**    Nurse 2 eSignature: Electronically signed by Althea Anne RN on 10/10/24 at 2:50 AM EDT

## 2024-10-09 NOTE — CONSULTS
[facility] Hospitalist Consult   Admit Date:  10/3/2024 10:59 PM   Name:  Estefanía Hope   Age:  73 y.o.  Sex:  female  :  1950   MRN:  [id number]   Room:  /    Presenting/Chief Complaint: No chief complaint on file.    Reason(s) for Admission: SBO (small bowel obstruction) (HCC) [K56.609]     Hospitalists consulted by Kashmir Ingram MD for: Assume care    History of Presenting Illness:   Estefanía Hope is a 73 y.o. female with medical history of HTN, hypothyroidism who presented to ED with abdominal pain.  CT abdomen revealed SBO with large hiatal hernia.  Patient was transferred to the ICU due to hypotension, needing pressors.  Surgery was consulted.  She is status post ex lap converted to open bowel resection on 10/4, repeat on 10/6.  She was intubated and extubated and downgraded to the floors.  Hospitalist consulted to resume care.  Patient does not have any active medical issues.  Will request surgery to resume care.    Assessment & Plan:   Small bowel obstruction (HCC)  Ischemia, bowel (HCC)  Septic shock (HCC)  Status post bowel resection on 10/4, 10/6  NG tube in place  Continue TPN  Continue management per surgery    HTN (hypertension)  Hypothyroidism  On losartan, hydrochlorothiazide  Hydralazine as needed  Continue levothyroxine    Leukocytosis      Anticipated Discharge Arrangements:   Inpatient Rehab Facility    PT/OT evals ordered?  Therapy evals ordered  Diet:  Diet NPO Exceptions are: Ice Chips  PN-Adult Premix 4.25/5 - Peripheral Line  VTE prophylaxis: SCD's   Code status: Full Code    Non-peripheral Lines and Tubes (if present):      NG/OG/NJ/NE Tube Nasogastric Right nostril (Active)       Urinary Catheter 10/04/24 (Active)     PICC 10/05/24 Right Basilic (Active)       Telemetry (if present):  Cardiac/Telemetry Monitor On: Portable telemetry pack applied        Hospital Problems:  Principal Problem:    Small bowel obstruction (HCC)  Active Problems:    HTN

## 2024-10-09 NOTE — ICUWATCH
RRT Clinical Rounding Nurse Progress Report      SUBJECTIVE: Patient assessed secondary to transfer from critical care.      Vitals:    10/08/24 2240 10/08/24 2256 10/09/24 0249 10/09/24 0747   BP: (!) 167/91  (!) 169/81 (!) 174/82   Pulse: 94 (!) 105 79 75   Resp: 18  18 20   Temp: 98 °F (36.7 °C)  97.9 °F (36.6 °C) 98.1 °F (36.7 °C)   TempSrc: Oral  Oral Oral   SpO2: 94%  95% 97%   Weight:       Height:            ASSESSMENT:  Pt lying awake in bed, in NAD.  A&Ox3.  Lung sounds clear.  On 2L NC.  NSR, HR 88 on remote telemetry.  Abd soft, incision site with staples cdi/radha. Bowel sounds active. NGT in place to LIS, green bilious output.  +flatus. No BM yet. UOP good.  Denies any pain/complaints.    PLAN:  Will discharge from RRT Clinical Rounding Program per protocol. Please call if needed.    Marixa Jones, DOMINIC  [facility]: [phone]  [facility] [place]: [phone]

## 2024-10-09 NOTE — PROGRESS NOTES
Admit Date: 10/3/2024  10/6/24 EXPLORATORY LAPAROTOMY, REMOVAL OF ABTHERA WOUND DRESSING, RESECTION OF SMALL BOWEL AND SMALL BOWEL ANASTOMOSIS. Dr. Montero    10/4/24 EXPLORATORY LAPAROSCOPY CONVERTED TO OPEN. SMALL BOWEL RESECTION WITH ABTHERA WOUND DRESSING AND VAC APPLIED. Dr. Durham     Subjective:     A/OX4 with NAD noted.  Respirations even unlabored on 2L/M NC   NGT in place with 1950 bilious output past 24h.  (500 mL bilious output previous 24H)  Endorses flatus.  No bowel movement.  Denies abdominal pain.  Guerrero in place with 4350 mL clear yellow UOP past 24H.  Tmax 100.4.  HR 80s-105.  BP 150s-190s/60s-90s    Objective:       Vitals:    10/09/24 0249 10/09/24 0747 10/09/24 1045 10/09/24 1056   BP: (!) 169/81 (!) 174/82  (!) 169/84   Pulse: 79 75  83   Resp: 18 20  18   Temp: 97.9 °F (36.6 °C) 98.1 °F (36.7 °C)  98.1 °F (36.7 °C)   TempSrc: Oral Oral  Oral   SpO2: 95% 97%  96%   Weight:   93 kg (205 lb)    Height:           Temp (24hrs), Av.2 °F (36.8 °C), Min:97.9 °F (36.6 °C), Max:98.6 °F (37 °C)  .  I&O reviewed as documented.      Physical Exam  Constitutional:       General: She is not in acute distress.  HENT:      Nose:      Comments: NGT     Mouth/Throat:      Mouth: Mucous membranes are dry.   Cardiovascular:      Rate and Rhythm: Normal rate and regular rhythm.      Pulses: Normal pulses.      Heart sounds: Normal heart sounds. No murmur heard.     No friction rub. No gallop.   Pulmonary:      Effort: Pulmonary effort is normal. No respiratory distress.      Breath sounds: Normal breath sounds.   Abdominal:      General: Bowel sounds are normal. There is no distension.      Palpations: Abdomen is soft.   Genitourinary:     Comments: guerrero  Musculoskeletal:         General: No swelling. Normal range of motion.      Cervical back: No rigidity.   Skin:     General: Skin is warm and dry.      Capillary Refill: Capillary refill takes less than 2 seconds.      Comments: Midline abdominal incision

## 2024-10-09 NOTE — CARE COORDINATION
PT/OT recommended IRC, CM met with pt and daughter at bedside to discuss inpt acute rehab, they voiced understanding and would like referral sent to 9th floor IRC. Referral sent. CM will continue to follow for discharge plan/needs.

## 2024-10-09 NOTE — PROGRESS NOTES
RRT Clinical Rounding Nurse Update    Vitals:    10/08/24 2059 10/08/24 2118 10/08/24 2240 10/08/24 2256   BP: (!) 161/79  (!) 167/91    Pulse: 86 87 94 (!) 105   Resp:  18 18    Temp: 98.4 °F (36.9 °C)  98 °F (36.7 °C)    TempSrc:   Oral    SpO2: 96% 96% 94%    Weight:       Height:            ASSESSMENT:  Previous outreach assessment was reviewed. There have been no significant changes since previous assessment.    PLAN:  Will follow per RRT Clinical Rounding Program protocol.    Kailey La RN  [place]: [phone]  [place]: [phone]

## 2024-10-09 NOTE — FLOWSHEET NOTE
10/09/24 0747   Vital Signs   Temp 98.1 °F (36.7 °C)   Temp Source Oral   Pulse 75   Heart Rate Source Monitor   Respirations 20   BP (!) 174/82   MAP (Calculated) 113   MAP (mmHg) 108   BP Location Left upper arm   Patient Position Supine   Oxygen Therapy   SpO2 97 %   O2 Device Nasal cannula   O2 Flow Rate (L/min) 2 L/min     Prn Hydralazine 10mg IV administered.

## 2024-10-09 NOTE — PLAN OF CARE
Problem: Pain  Goal: Verbalizes/displays adequate comfort level or baseline comfort level  10/9/2024 1151 by Scarlett Chin, RN  Outcome: Progressing  10/8/2024 2325 by Althea Anne, RN  Outcome: Progressing

## 2024-10-10 LAB
ANION GAP SERPL CALC-SCNC: 10 MMOL/L (ref 9–18)
BACTERIA SPEC CULT: NORMAL
BASOPHILS # BLD: 0 K/UL (ref 0–0.2)
BASOPHILS NFR BLD: 0 % (ref 0–2)
BUN SERPL-MCNC: 16 MG/DL (ref 8–23)
CALCIUM SERPL-MCNC: 8.5 MG/DL (ref 8.8–10.2)
CHLORIDE SERPL-SCNC: 104 MMOL/L (ref 98–107)
CO2 SERPL-SCNC: 23 MMOL/L (ref 20–28)
CREAT SERPL-MCNC: 0.64 MG/DL (ref 0.6–1.1)
DIFFERENTIAL METHOD BLD: ABNORMAL
EOSINOPHIL # BLD: 0.4 K/UL (ref 0–0.8)
EOSINOPHIL NFR BLD: 6 % (ref 0.5–7.8)
ERYTHROCYTE [DISTWIDTH] IN BLOOD BY AUTOMATED COUNT: 12.2 % (ref 11.9–14.6)
GLUCOSE BLD STRIP.AUTO-MCNC: 131 MG/DL (ref 65–100)
GLUCOSE BLD STRIP.AUTO-MCNC: 144 MG/DL (ref 65–100)
GLUCOSE BLD STRIP.AUTO-MCNC: 146 MG/DL (ref 65–100)
GLUCOSE SERPL-MCNC: 157 MG/DL (ref 70–99)
HCT VFR BLD AUTO: 27.8 % (ref 35.8–46.3)
HGB BLD-MCNC: 9.2 G/DL (ref 11.7–15.4)
IMM GRANULOCYTES # BLD AUTO: 0.1 K/UL (ref 0–0.5)
IMM GRANULOCYTES NFR BLD AUTO: 2 % (ref 0–5)
LYMPHOCYTES # BLD: 1 K/UL (ref 0.5–4.6)
LYMPHOCYTES NFR BLD: 16 % (ref 13–44)
MAGNESIUM SERPL-MCNC: 2.1 MG/DL (ref 1.8–2.4)
MCH RBC QN AUTO: 31.8 PG (ref 26.1–32.9)
MCHC RBC AUTO-ENTMCNC: 33.1 G/DL (ref 31.4–35)
MCV RBC AUTO: 96.2 FL (ref 82–102)
MONOCYTES # BLD: 0.8 K/UL (ref 0.1–1.3)
MONOCYTES NFR BLD: 12 % (ref 4–12)
NEUTS SEG # BLD: 4.3 K/UL (ref 1.7–8.2)
NEUTS SEG NFR BLD: 64 % (ref 43–78)
NRBC # BLD: 0 K/UL (ref 0–0.2)
PHOSPHATE SERPL-MCNC: 2.9 MG/DL (ref 2.5–4.5)
PLATELET # BLD AUTO: 168 K/UL (ref 150–450)
PMV BLD AUTO: 9.3 FL (ref 9.4–12.3)
POTASSIUM SERPL-SCNC: 3.9 MMOL/L (ref 3.5–5.1)
RBC # BLD AUTO: 2.89 M/UL (ref 4.05–5.2)
SERVICE CMNT-IMP: ABNORMAL
SERVICE CMNT-IMP: NORMAL
SODIUM SERPL-SCNC: 137 MMOL/L (ref 136–145)
WBC # BLD AUTO: 6.7 K/UL (ref 4.3–11.1)

## 2024-10-10 PROCEDURE — 97530 THERAPEUTIC ACTIVITIES: CPT

## 2024-10-10 PROCEDURE — 2500000003 HC RX 250 WO HCPCS: Performed by: INTERNAL MEDICINE

## 2024-10-10 PROCEDURE — 82962 GLUCOSE BLOOD TEST: CPT

## 2024-10-10 PROCEDURE — 6370000000 HC RX 637 (ALT 250 FOR IP): Performed by: STUDENT IN AN ORGANIZED HEALTH CARE EDUCATION/TRAINING PROGRAM

## 2024-10-10 PROCEDURE — 6370000000 HC RX 637 (ALT 250 FOR IP): Performed by: INTERNAL MEDICINE

## 2024-10-10 PROCEDURE — 36415 COLL VENOUS BLD VENIPUNCTURE: CPT

## 2024-10-10 PROCEDURE — 6360000002 HC RX W HCPCS: Performed by: SURGERY

## 2024-10-10 PROCEDURE — 83735 ASSAY OF MAGNESIUM: CPT

## 2024-10-10 PROCEDURE — 85025 COMPLETE CBC W/AUTO DIFF WBC: CPT

## 2024-10-10 PROCEDURE — 84100 ASSAY OF PHOSPHORUS: CPT

## 2024-10-10 PROCEDURE — 2580000003 HC RX 258: Performed by: SURGERY

## 2024-10-10 PROCEDURE — 6360000002 HC RX W HCPCS: Performed by: INTERNAL MEDICINE

## 2024-10-10 PROCEDURE — 2580000003 HC RX 258: Performed by: INTERNAL MEDICINE

## 2024-10-10 PROCEDURE — 1100000003 HC PRIVATE W/ TELEMETRY

## 2024-10-10 PROCEDURE — 2500000003 HC RX 250 WO HCPCS: Performed by: SURGERY

## 2024-10-10 PROCEDURE — 6360000002 HC RX W HCPCS

## 2024-10-10 PROCEDURE — 80048 BASIC METABOLIC PNL TOTAL CA: CPT

## 2024-10-10 RX ORDER — AMLODIPINE BESYLATE 10 MG/1
10 TABLET ORAL DAILY
Status: DISCONTINUED | OUTPATIENT
Start: 2024-10-10 | End: 2024-10-11 | Stop reason: HOSPADM

## 2024-10-10 RX ORDER — DEXTROSE MONOHYDRATE 25 G/50ML
25 INJECTION, SOLUTION INTRAVENOUS PRN
Status: DISCONTINUED | OUTPATIENT
Start: 2024-10-10 | End: 2024-10-11 | Stop reason: HOSPADM

## 2024-10-10 RX ORDER — HYDROCHLOROTHIAZIDE 25 MG/1
12.5 TABLET ORAL DAILY
Status: DISCONTINUED | OUTPATIENT
Start: 2024-10-10 | End: 2024-10-10

## 2024-10-10 RX ORDER — DEXTROSE MONOHYDRATE 100 MG/ML
INJECTION, SOLUTION INTRAVENOUS CONTINUOUS PRN
Status: DISCONTINUED | OUTPATIENT
Start: 2024-10-10 | End: 2024-10-11 | Stop reason: HOSPADM

## 2024-10-10 RX ORDER — HYDROCHLOROTHIAZIDE 25 MG/1
25 TABLET ORAL DAILY
Status: DISCONTINUED | OUTPATIENT
Start: 2024-10-11 | End: 2024-10-11 | Stop reason: HOSPADM

## 2024-10-10 RX ORDER — LOSARTAN POTASSIUM 50 MG/1
100 TABLET ORAL DAILY
Status: DISCONTINUED | OUTPATIENT
Start: 2024-10-10 | End: 2024-10-10

## 2024-10-10 RX ORDER — HYDROCHLOROTHIAZIDE 25 MG/1
25 TABLET ORAL ONCE
Status: COMPLETED | OUTPATIENT
Start: 2024-10-10 | End: 2024-10-10

## 2024-10-10 RX ORDER — IBUPROFEN 600 MG/1
1 TABLET ORAL PRN
Status: DISCONTINUED | OUTPATIENT
Start: 2024-10-10 | End: 2024-10-11 | Stop reason: HOSPADM

## 2024-10-10 RX ORDER — LOSARTAN POTASSIUM 50 MG/1
100 TABLET ORAL DAILY
Status: DISCONTINUED | OUTPATIENT
Start: 2024-10-11 | End: 2024-10-11 | Stop reason: HOSPADM

## 2024-10-10 RX ORDER — INSULIN LISPRO 100 [IU]/ML
0-4 INJECTION, SOLUTION INTRAVENOUS; SUBCUTANEOUS
Status: DISCONTINUED | OUTPATIENT
Start: 2024-10-10 | End: 2024-10-10

## 2024-10-10 RX ORDER — DEXTROSE MONOHYDRATE 25 G/50ML
12.5 INJECTION, SOLUTION INTRAVENOUS PRN
Status: DISCONTINUED | OUTPATIENT
Start: 2024-10-10 | End: 2024-10-11 | Stop reason: HOSPADM

## 2024-10-10 RX ADMIN — PANTOPRAZOLE SODIUM 40 MG: 40 INJECTION, POWDER, FOR SOLUTION INTRAVENOUS at 08:59

## 2024-10-10 RX ADMIN — METOCLOPRAMIDE HYDROCHLORIDE 10 MG: 5 INJECTION INTRAMUSCULAR; INTRAVENOUS at 12:38

## 2024-10-10 RX ADMIN — PIPERACILLIN AND TAZOBACTAM 3375 MG: 3; .375 INJECTION, POWDER, LYOPHILIZED, FOR SOLUTION INTRAVENOUS at 04:37

## 2024-10-10 RX ADMIN — SODIUM CHLORIDE, PRESERVATIVE FREE 10 ML: 5 INJECTION INTRAVENOUS at 21:00

## 2024-10-10 RX ADMIN — METOCLOPRAMIDE HYDROCHLORIDE 10 MG: 5 INJECTION INTRAMUSCULAR; INTRAVENOUS at 04:32

## 2024-10-10 RX ADMIN — CALCIUM GLUCONATE: 98 INJECTION, SOLUTION INTRAVENOUS at 17:46

## 2024-10-10 RX ADMIN — MORPHINE SULFATE 1 MG: 2 INJECTION, SOLUTION INTRAMUSCULAR; INTRAVENOUS at 18:50

## 2024-10-10 RX ADMIN — LEVOTHYROXINE SODIUM 50 MCG: 0.05 TABLET ORAL at 04:32

## 2024-10-10 RX ADMIN — LOSARTAN POTASSIUM 100 MG: 50 TABLET, FILM COATED ORAL at 08:58

## 2024-10-10 RX ADMIN — AMLODIPINE BESYLATE 10 MG: 10 TABLET ORAL at 13:15

## 2024-10-10 RX ADMIN — FERROUS SULFATE TAB 325 MG (65 MG ELEMENTAL FE) 325 MG: 325 (65 FE) TAB at 12:39

## 2024-10-10 RX ADMIN — I.V. FAT EMULSION 250 ML: 20 EMULSION INTRAVENOUS at 17:47

## 2024-10-10 RX ADMIN — METOCLOPRAMIDE HYDROCHLORIDE 10 MG: 5 INJECTION INTRAMUSCULAR; INTRAVENOUS at 23:09

## 2024-10-10 RX ADMIN — PIPERACILLIN AND TAZOBACTAM 3375 MG: 3; .375 INJECTION, POWDER, LYOPHILIZED, FOR SOLUTION INTRAVENOUS at 12:44

## 2024-10-10 RX ADMIN — METOCLOPRAMIDE HYDROCHLORIDE 10 MG: 5 INJECTION INTRAMUSCULAR; INTRAVENOUS at 17:44

## 2024-10-10 RX ADMIN — HYDROCHLOROTHIAZIDE 12.5 MG: 25 TABLET ORAL at 08:59

## 2024-10-10 RX ADMIN — HYDROCHLOROTHIAZIDE 25 MG: 25 TABLET ORAL at 14:30

## 2024-10-10 RX ADMIN — SODIUM CHLORIDE, PRESERVATIVE FREE 10 ML: 5 INJECTION INTRAVENOUS at 09:00

## 2024-10-10 RX ADMIN — ATORVASTATIN CALCIUM 40 MG: 40 TABLET, FILM COATED ORAL at 22:22

## 2024-10-10 ASSESSMENT — PAIN SCALES - GENERAL
PAINLEVEL_OUTOF10: 4
PAINLEVEL_OUTOF10: 0

## 2024-10-10 ASSESSMENT — PAIN DESCRIPTION - DESCRIPTORS: DESCRIPTORS: ACHING

## 2024-10-10 ASSESSMENT — PAIN DESCRIPTION - LOCATION: LOCATION: ABDOMEN

## 2024-10-10 ASSESSMENT — PAIN DESCRIPTION - ORIENTATION: ORIENTATION: RIGHT;LEFT;LOWER

## 2024-10-10 NOTE — PLAN OF CARE
Problem: Discharge Planning  Goal: Discharge to home or other facility with appropriate resources  10/10/2024 0004 by Althea Anne RN  Outcome: Progressing  10/9/2024 1151 by Scarlett Chin RN  Outcome: Progressing  10/9/2024 1151 by Scarlett Chin RN  Outcome: Progressing  Flowsheets (Taken 10/9/2024 3003)  Discharge to home or other facility with appropriate resources: Identify barriers to discharge with patient and caregiver     Problem: Safety - Adult  Goal: Free from fall injury  10/10/2024 0004 by Althea Anne RN  Outcome: Progressing  10/9/2024 1151 by Scarlett Chin RN  Outcome: Progressing  10/9/2024 1151 by Scarlett Chin RN  Outcome: Progressing     Problem: Skin/Tissue Integrity  Goal: Absence of new skin breakdown  Description: 1.  Monitor for areas of redness and/or skin breakdown  2.  Assess vascular access sites hourly  3.  Every 4-6 hours minimum:  Change oxygen saturation probe site  4.  Every 4-6 hours:  If on nasal continuous positive airway pressure, respiratory therapy assess nares and determine need for appliance change or resting period.  10/10/2024 0004 by Althea Anne RN  Outcome: Progressing  10/9/2024 1151 by Scarlett Chin RN  Outcome: Progressing     Problem: Safety - Medical Restraint  Goal: Remains free of injury from restraints (Restraint for Interference with Medical Device)  Description: INTERVENTIONS:  1. Determine that other, less restrictive measures have been tried or would not be effective before applying the restraint  2. Evaluate the patient's condition at the time of restraint application  3. Inform patient/family regarding the reason for restraint  4. Q2H: Monitor safety, psychosocial status, comfort, nutrition and hydration  Outcome: Progressing     Problem: Pain  Goal: Verbalizes/displays adequate comfort level or baseline comfort level  10/10/2024 0004 by Althea Anne RN  Outcome: Progressing  10/9/2024 1151 by Scarlett Chin  RN  Outcome: Progressing     Problem: ABCDS Injury Assessment  Goal: Absence of physical injury  Outcome: Progressing

## 2024-10-10 NOTE — PROGRESS NOTES
2030: On call provider notified of patient's KUB results. Orders received to repeat KUB.     On call provider notified of patient's second KUB results. Orders received  to remove NG tube and place patient on NPO diet with sips of clears and ice chips.

## 2024-10-10 NOTE — PROGRESS NOTES
Comprehensive Nutrition Assessment    Type and Reason for Visit: Reassess  Parenteral Nutrition Management (Pulmonology)    Nutrition Recommendations/Plan:   Parenteral Nutrition:  Central parenteral nutrition    Central line infusion  Change: Dex 5%, 4.25% AA 2 L (85ml/hr)   Continue 250 ml 20% lipids daily  To provide: 1180 kcal/day (80% of needs), 85 grams of protein/day (100% of needs), 100 grams of CHO/day and ~2250 ml of total volume/day.   Above regimen:  Starting wean  Electrolytes/L:   Sodium ( 75 meq NaCl), Potassium ( 20 meq KCl) Phosphorus ( 15 mmol KPO4), Calcium (4.5 meq), Magnesium 8 meq   Additives per bag:   MVI, MTE  Thiamine 100 mg daily x 7 days (EOT 10/13)  Folic Acid 1 mg daily x 7 days (EOT 10/13)  Nutrition Related Medication Management:  Electrolyte Replacement Protocol PRN Continue for Magnesium, Potassium, and Phosphorus  IVF:  N/A  Labs:   BMP daily  Mg daily x 7 days at initiation then MWF  Phos daily x 7 days at initiation then MWF    Triglyceride weekly on Tuesday  Hepatic Panel weekly on Tuesdays  POC Glucoses/SSI Discontinue  Meals and Snacks:  Diet: Continue current diet and advance as medically appropriate  Oral Nutriton Supplement Therapy:   Medical food supplement therapy:  Initiate Ensure Clear (clear liquid oral supplement) 240 calories, 8 grams protein per 8 ounce serving     Malnutrition Assessment:  Malnutrition Status: At risk for malnutrition (Comment) (S/p open abdominal surgery x2, NPO)  C  Mild temporal wasting  Nutrition Assessment:  Nutrition History: Daughter at bedside provides history. Usual intake is 2 meals per day. No changes prior to acute events. Symptoms started day of admission.     Do You Have Any Cultural, Yazdanism, or Ethnic Food Preferences?: No   Weight History: Very limited. Last office visit 195# on 9/1/23. Daughter reports minimal weight changes.  Nutrition Background:       PMH: HTN, hypothyroidism. Presented with abdominal pain and N/V. CT with  Nutrition Therapies:  PN-Adult Premix 4.25/10 - Central Line  ADULT DIET; Clear Liquid; No Carbonated Beverages, No Concentrated sweets  Current Parenteral Nutrition Orders:  Type and Formula: Premix Central (Dex 5%, 4.25% AA)   Lipids: 250ml, Daily  Duration: Continuous  Rate/Volume: 2 L (85ml/hr)  Current PN Order Provides: 1180 kcal/day (80% of needs), 85 grams of protein/day (100% of needs), 100 grams of CHO/day and ~2250 ml of total volume/day.    Current Intake:   Average Meal Intake:  (No intakes yet)        Anthropometric Measures:  Height: 167.6 cm (5' 6\")  Current Body Wt: 93 kg (205 lb 0.4 oz) (10/9), Weight source: Bed Scale  BMI: 33.1,  (currently skewed due to fluid status)  Admission Body Weight: 83.9 kg (184 lb 15.5 oz) (10/4 stated)  Ideal Body Weight (Kg) (Calculated): 59 kg (130 lbs), 159.1 %  BMI Category  (currently skewed due to fluid status)  Estimated Daily Nutrient Needs:  Energy (kcal/day): 7903-3727 (25-30 kcal/kg) (Kcal/kg Weight used: 59.1 kg Ideal  Protein (g/day): 77-89 (1.3-1.5 g/kg) Weight Used: (Ideal) 59.1 kg  Fluid (ml/day):   (1 ml/kcal)    Nutrition Diagnosis:   Inadequate oral intake related to altered GI function as evidenced by NPO or clear liquid status due to medical condition  Nutrition Interventions:   Food and/or Nutrient Delivery: Modify Parenteral Nutrition     Coordination of Nutrition Care: Continue to monitor while inpatient      Goals:   Previous Goal Met: Progressing toward Goal(s)  Active Goal: Tolerate nutrition support at goal rate (within 5 days)       Nutrition Monitoring and Evaluation:      Food/Nutrient Intake Outcomes: Diet Advancement/Tolerance, Food and Nutrient Intake, Supplement Intake, Parenteral Nutrition Intake/Tolerance  Physical Signs/Symptoms Outcomes: Biochemical Data, GI Status, Fluid Status or Edema, Meal Time Behavior, Weight    Discharge Planning:    Too soon to determine    DANITZA NESS, RD  [phone]

## 2024-10-10 NOTE — PROGRESS NOTES
Neutrophils Absolute 4.3 1.7 - 8.2 K/UL    Lymphocytes Absolute 1.0 0.5 - 4.6 K/UL    Monocytes Absolute 0.8 0.1 - 1.3 K/UL    Eosinophils Absolute 0.4 0.0 - 0.8 K/UL    Basophils Absolute 0.0 0.0 - 0.2 K/UL    Immature Granulocytes Absolute 0.1 0.0 - 0.5 K/UL   POCT Glucose    Collection Time: 10/10/24  8:29 AM   Result Value Ref Range    POC Glucose 131 (H) 65 - 100 mg/dL    Performed by: Kasie          Assessment:     Principal Problem:    Small bowel obstruction (HCC)  Active Problems:    HTN (hypertension)    Hypothyroidism    Leukocytosis    Ischemia, bowel (HCC)    Septic shock (HCC)    Encounter for weaning from ventilator (HCC)    Ischemic bowel disease (HCC)    SBO (small bowel obstruction) (HCC)    Pleural effusion  Resolved Problems:    * No resolved hospital problems. *        Plan/Recommendations/Medical Decision Making:     PLAN    Start clear liquid diet.  No concentrated sweets.  No carbonation  Start clear liquid nutritional supplementation.  Change FSBS to before meals and at bedtime from every 6 hours.  Hypoglycemia treatment protocol ordered  Wean supplemental O2 as tolerated to room air  D/C. Ruby  D/C telemetry  Will plan to start weaning TPN tomorrow 10/11/24  WBC 6.7 on Zosyn  10/10/24 D/C zosyn    PT/OT recommending IRC.   CM assisting with discharge disposition.     CLAIRE Tapia - JENNIFER

## 2024-10-10 NOTE — PROGRESS NOTES
END OF SHIFT NOTE:    INTAKE/OUTPUT  10/09 0701 - 10/10 0700  In: 4647.1 [I.V.:117.8]  Out: 5475 [Urine:3025]  Voiding: No  Catheter: Yes  Drain:   Urinary Catheter 10/04/24 (Active)   Catheter Indications Need for fluid volume management of the critically ill patient in a critical care setting 10/09/24 1942   Site Assessment No urethral drainage 10/09/24 1942   Urine Color Yellow 10/09/24 1942   Urine Appearance Clear 10/09/24 1942   Collection Container Standard 10/09/24 1942   Securement Method Securing device (Describe) 10/09/24 1942   Catheter Care  Perineal wipes 10/09/24 0418   Catheter Best Practices  Drainage tube clipped to bed;Catheter secured to thigh;Tamper seal intact;Bag below bladder;Bag not on floor;Lack of dependent loop in tubing;Drainage bag less than half full 10/09/24 1942   Status Patent 10/09/24 1942   Output (mL) 125 mL 10/10/24 0320   Discontinuation Reason Per provider order 10/05/24 0710               Flatus: Patient does have flatus present.    Stool: 1 occurrences.    Characteristics:  Stool Appearance: Soft  Stool Color: Brown  Stool Amount: Large  Stool Assessment  Incontinence: No  Stool Appearance: Soft  Stool Color: Brown  Stool Amount: Large  Stool Source: Rectum  Last BM (including prior to admit): 10/09/24    Emesis: 0 occurrences.    Characteristics:        VITAL SIGNS  Patient Vitals for the past 12 hrs:   Temp Pulse Resp BP SpO2   10/10/24 0601 -- -- 16 -- --   10/10/24 0320 98.4 °F (36.9 °C) 92 -- (!) 148/79 96 %   10/09/24 2345 -- -- -- (!) 158/78 --   10/09/24 2310 -- 81 -- -- --   10/09/24 2255 99 °F (37.2 °C) 81 16 (!) 169/82 98 %   10/09/24 2007 -- 82 -- -- 96 %   10/09/24 2002 98.1 °F (36.7 °C) 84 16 (!) 157/80 95 %       Pain Assessment  Pain Level: 0 (10/09/24 1942)  Pain Location: Abdomen  Patient's Stated Pain Goal: 2    Ambulating  No    Shift report given to oncoming nurse at the bedside.    Althea Anne RN

## 2024-10-10 NOTE — PROGRESS NOTES
ACUTE PHYSICAL THERAPY GOALS:   (Developed with and agreed upon by patient and/or caregiver.)  (1.) Estefanía Hope  will move from supine to sit and sit to supine  with MINIMAL ASSIST within 7 treatment day(s).    (2.) Estefanía Hope will transfer from bed to chair and chair to bed with MINIMAL ASSIST using the least restrictive device within 7 treatment day(s).    (3.) Estefanía Hope will ambulate with MINIMAL ASSIST for 50 feet with the least restrictive device within 7 treatment day(s).   (4.) Estefanía Hope will perform standing static and dynamic balance activities x 10 minutes with MINIMAL ASSIST to improve safety within 7 treatment day(s).  (5.) Estefanía Hope will ascend and descend 1 stairs using no hand rail(s) with MINIMAL ASSIST to improve functional mobility and safety within 7 treatment day(s).  (6.) Estefanía Hope will perform therapeutic exercises x 10 min for HEP with SUPERVISION to improve strength, endurance, and functional mobility within 7 treatment day(s).     PHYSICAL THERAPY: Daily Note PM   (Link to Caseload Tracking: PT Visit Days : 2  Time In/Out PT Charge Capture  Rehab Caseload Tracker  Orders    Estefanía Hope is a 73 y.o. female   PRIMARY DIAGNOSIS: Small bowel obstruction (HCC)  SBO (small bowel obstruction) (HCC) [K56.609]  Procedure(s) (LRB):  EXPLORATORY LAPAROTOMY, REMOVAL OF ABTHERA WOUND DRESSING, RESECTION OF SMALL BOWEL AND SMALL BOWEL ANASTOMOSIS (N/A)  4 Days Post-Op  Inpatient: Payor: MEDICARE / Plan: MEDICARE PART A AND B / Product Type: *No Product type* /     ASSESSMENT:     REHAB RECOMMENDATIONS:   Recommendation to date pending progress:  Setting:  Inpatient Rehab Facility    Equipment:    To Be Determined     ASSESSMENT:  Ms. Hope presents resting in bed, supportive sister at bedside, agreeable to therapy. She is endorsing good pain control and overall feeling good.    This date pt performs mobility including bed mobility (instruction  IN/OUT:  Time In: 1450  Time Out: 1514  Minutes: 24    Rocío Gale, PT

## 2024-10-10 NOTE — PROGRESS NOTES
Hospitalist Progress Note   Admit Date:  10/3/2024 10:59 PM   Name:  Estefanía Hope   Age:  73 y.o.  Sex:  female  :  1950   MRN:  [id number]   Room:  214/01    Presenting/Chief Complaint: No chief complaint on file.     Reason(s) for Admission: SBO (small bowel obstruction) (HCC) [K56.609]     Hospital Course:   Estefanía Hope is a 73 y.o. female with medical history of HTN, hypothyroidism who presented to ED with abdominal pain.  CT abdomen revealed SBO with large hiatal hernia.  Patient was transferred to the ICU due to hypotension, needing pressors.  Surgery was consulted.  She is status post ex lap converted to open bowel resection on 10/4, repeat on 10/6.  She was intubated and extubated and downgraded to the floors.  Hospitalist consulted for medical management.      Subjective & 24hr Events:   Seen and examined at bedside this morning.  No acute events overnight.  Daughter noted at bedside.  Denies any complaints.      Assessment & Plan:   Small bowel obstruction (HCC)  Ischemia, bowel (HCC)  Septic shock (HCC)  Status post bowel resection on 10/4, 10/6  NG tube in place  Continue TPN  Continue management per surgery     HTN (hypertension)  Hypothyroidism  On losartan, hydrochlorothiazide, amlodipine  Hydralazine as needed  Continue levothyroxine      Anticipated Discharge Arrangements:   Defer to primary team    PT/OT evals ordered?  Defer to primary team  Diet:  PN-Adult Premix 4.25/10 - Central Line  ADULT DIET; Clear Liquid; No Carbonated Beverages, No Concentrated sweets  PN-Adult Premix 4.25/5 - Peripheral Line  ADULT ORAL NUTRITION SUPPLEMENT; Breakfast, Lunch, Dinner; Clear Liquid Oral Supplement  VTE prophylaxis: Defer to primary team  Code status: Full Code      Non-peripheral Lines and Tubes (if present):      Urinary Catheter 10/04/24 (Active)     PICC 10/05/24 Right Basilic (Active)       Telemetry (if present):  Cardiac/Telemetry Monitor On: Portable telemetry pack

## 2024-10-10 NOTE — CARE COORDINATION
LOS 7D    Dr. Sargent PM&R from 9th floor evaluated pt. Is considering for IPR.   He will continue to evaluate pending further medical course and ongoing therapy assessment.  CM service will continue to follow and make referrals as needed based on clinical course.

## 2024-10-11 ENCOUNTER — HOSPITAL ENCOUNTER (INPATIENT)
Age: 74
LOS: 7 days | Discharge: HOME HEALTH | DRG: 948 | End: 2024-10-18
Attending: PHYSICAL MEDICINE & REHABILITATION | Admitting: PHYSICAL MEDICINE & REHABILITATION
Payer: MEDICARE

## 2024-10-11 VITALS
DIASTOLIC BLOOD PRESSURE: 90 MMHG | HEIGHT: 66 IN | BODY MASS INDEX: 32.08 KG/M2 | WEIGHT: 199.6 LBS | TEMPERATURE: 98.6 F | HEART RATE: 102 BPM | SYSTOLIC BLOOD PRESSURE: 139 MMHG | OXYGEN SATURATION: 98 % | RESPIRATION RATE: 16 BRPM

## 2024-10-11 PROBLEM — R53.81 PHYSICAL DEBILITY: Status: ACTIVE | Noted: 2024-10-11

## 2024-10-11 LAB
ANION GAP SERPL CALC-SCNC: 11 MMOL/L (ref 9–18)
BUN SERPL-MCNC: 15 MG/DL (ref 8–23)
CALCIUM SERPL-MCNC: 8.9 MG/DL (ref 8.8–10.2)
CHLORIDE SERPL-SCNC: 103 MMOL/L (ref 98–107)
CO2 SERPL-SCNC: 23 MMOL/L (ref 20–28)
CREAT SERPL-MCNC: 0.64 MG/DL (ref 0.6–1.1)
GLUCOSE BLD STRIP.AUTO-MCNC: 113 MG/DL (ref 65–100)
GLUCOSE SERPL-MCNC: 125 MG/DL (ref 70–99)
MAGNESIUM SERPL-MCNC: 2 MG/DL (ref 1.8–2.4)
PHOSPHATE SERPL-MCNC: 3.1 MG/DL (ref 2.5–4.5)
POTASSIUM SERPL-SCNC: 3.8 MMOL/L (ref 3.5–5.1)
SERVICE CMNT-IMP: ABNORMAL
SODIUM SERPL-SCNC: 136 MMOL/L (ref 136–145)

## 2024-10-11 PROCEDURE — 6370000000 HC RX 637 (ALT 250 FOR IP): Performed by: PHYSICAL MEDICINE & REHABILITATION

## 2024-10-11 PROCEDURE — 6370000000 HC RX 637 (ALT 250 FOR IP): Performed by: STUDENT IN AN ORGANIZED HEALTH CARE EDUCATION/TRAINING PROGRAM

## 2024-10-11 PROCEDURE — 80048 BASIC METABOLIC PNL TOTAL CA: CPT

## 2024-10-11 PROCEDURE — 36415 COLL VENOUS BLD VENIPUNCTURE: CPT

## 2024-10-11 PROCEDURE — 84100 ASSAY OF PHOSPHORUS: CPT

## 2024-10-11 PROCEDURE — 6370000000 HC RX 637 (ALT 250 FOR IP)

## 2024-10-11 PROCEDURE — 6360000002 HC RX W HCPCS: Performed by: INTERNAL MEDICINE

## 2024-10-11 PROCEDURE — 6360000002 HC RX W HCPCS

## 2024-10-11 PROCEDURE — 99222 1ST HOSP IP/OBS MODERATE 55: CPT | Performed by: PHYSICAL MEDICINE & REHABILITATION

## 2024-10-11 PROCEDURE — 6370000000 HC RX 637 (ALT 250 FOR IP): Performed by: INTERNAL MEDICINE

## 2024-10-11 PROCEDURE — 82962 GLUCOSE BLOOD TEST: CPT

## 2024-10-11 PROCEDURE — 2580000003 HC RX 258: Performed by: INTERNAL MEDICINE

## 2024-10-11 PROCEDURE — 1180000000 HC REHAB R&B

## 2024-10-11 PROCEDURE — 83735 ASSAY OF MAGNESIUM: CPT

## 2024-10-11 RX ORDER — SENNOSIDES A AND B 8.6 MG/1
2 TABLET, FILM COATED ORAL NIGHTLY PRN
Status: DISCONTINUED | OUTPATIENT
Start: 2024-10-11 | End: 2024-10-18 | Stop reason: HOSPADM

## 2024-10-11 RX ORDER — ONDANSETRON 4 MG/1
4 TABLET, ORALLY DISINTEGRATING ORAL EVERY 8 HOURS PRN
Status: CANCELLED | OUTPATIENT
Start: 2024-10-11

## 2024-10-11 RX ORDER — AMLODIPINE BESYLATE 10 MG/1
10 TABLET ORAL DAILY
Qty: 30 TABLET | Refills: 3 | Status: SHIPPED | OUTPATIENT
Start: 2024-10-12

## 2024-10-11 RX ORDER — FERROUS SULFATE 325(65) MG
325 TABLET ORAL
Status: DISCONTINUED | OUTPATIENT
Start: 2024-10-12 | End: 2024-10-18 | Stop reason: HOSPADM

## 2024-10-11 RX ORDER — AMLODIPINE BESYLATE 10 MG/1
10 TABLET ORAL DAILY
Status: CANCELLED | OUTPATIENT
Start: 2024-10-12

## 2024-10-11 RX ORDER — HYDRALAZINE HYDROCHLORIDE 25 MG/1
25 TABLET, FILM COATED ORAL EVERY 8 HOURS SCHEDULED
Status: CANCELLED | OUTPATIENT
Start: 2024-10-11

## 2024-10-11 RX ORDER — POLYETHYLENE GLYCOL 3350 17 G/17G
17 POWDER, FOR SOLUTION ORAL DAILY PRN
Status: DISCONTINUED | OUTPATIENT
Start: 2024-10-11 | End: 2024-10-18 | Stop reason: HOSPADM

## 2024-10-11 RX ORDER — PANTOPRAZOLE SODIUM 40 MG/1
40 TABLET, DELAYED RELEASE ORAL
Status: CANCELLED | OUTPATIENT
Start: 2024-10-12

## 2024-10-11 RX ORDER — ATORVASTATIN CALCIUM 40 MG/1
40 TABLET, FILM COATED ORAL NIGHTLY
Status: DISCONTINUED | OUTPATIENT
Start: 2024-10-11 | End: 2024-10-18 | Stop reason: HOSPADM

## 2024-10-11 RX ORDER — ATORVASTATIN CALCIUM 40 MG/1
40 TABLET, FILM COATED ORAL NIGHTLY
Status: CANCELLED | OUTPATIENT
Start: 2024-10-11

## 2024-10-11 RX ORDER — HYDROCHLOROTHIAZIDE 25 MG/1
25 TABLET ORAL DAILY
Status: CANCELLED | OUTPATIENT
Start: 2024-10-12

## 2024-10-11 RX ORDER — OXYCODONE HYDROCHLORIDE 5 MG/1
5 TABLET ORAL EVERY 6 HOURS PRN
Status: DISCONTINUED | OUTPATIENT
Start: 2024-10-11 | End: 2024-10-18 | Stop reason: HOSPADM

## 2024-10-11 RX ORDER — AMLODIPINE BESYLATE 10 MG/1
10 TABLET ORAL DAILY
Status: DISCONTINUED | OUTPATIENT
Start: 2024-10-12 | End: 2024-10-18 | Stop reason: HOSPADM

## 2024-10-11 RX ORDER — ONDANSETRON 4 MG/1
4 TABLET, ORALLY DISINTEGRATING ORAL EVERY 8 HOURS PRN
Status: DISCONTINUED | OUTPATIENT
Start: 2024-10-11 | End: 2024-10-18 | Stop reason: HOSPADM

## 2024-10-11 RX ORDER — ENOXAPARIN SODIUM 100 MG/ML
40 INJECTION SUBCUTANEOUS DAILY
Status: DISCONTINUED | OUTPATIENT
Start: 2024-10-12 | End: 2024-10-18 | Stop reason: HOSPADM

## 2024-10-11 RX ORDER — POLYETHYLENE GLYCOL 3350 17 G/17G
17 POWDER, FOR SOLUTION ORAL DAILY PRN
Status: CANCELLED | OUTPATIENT
Start: 2024-10-11

## 2024-10-11 RX ORDER — LEVOTHYROXINE SODIUM 50 UG/1
50 TABLET ORAL
Status: DISCONTINUED | OUTPATIENT
Start: 2024-10-12 | End: 2024-10-18 | Stop reason: HOSPADM

## 2024-10-11 RX ORDER — ACETAMINOPHEN 325 MG/1
650 TABLET ORAL EVERY 6 HOURS PRN
Status: CANCELLED | OUTPATIENT
Start: 2024-10-11

## 2024-10-11 RX ORDER — LOSARTAN POTASSIUM 50 MG/1
100 TABLET ORAL DAILY
Status: DISCONTINUED | OUTPATIENT
Start: 2024-10-12 | End: 2024-10-18 | Stop reason: HOSPADM

## 2024-10-11 RX ORDER — ENOXAPARIN SODIUM 100 MG/ML
40 INJECTION SUBCUTANEOUS DAILY
Status: CANCELLED | OUTPATIENT
Start: 2024-10-12

## 2024-10-11 RX ORDER — HYDRALAZINE HYDROCHLORIDE 25 MG/1
25 TABLET, FILM COATED ORAL EVERY 8 HOURS SCHEDULED
Status: DISCONTINUED | OUTPATIENT
Start: 2024-10-11 | End: 2024-10-11 | Stop reason: HOSPADM

## 2024-10-11 RX ORDER — SERTRALINE HYDROCHLORIDE 100 MG/1
100 TABLET, FILM COATED ORAL DAILY
Status: DISCONTINUED | OUTPATIENT
Start: 2024-10-12 | End: 2024-10-18 | Stop reason: HOSPADM

## 2024-10-11 RX ORDER — SERTRALINE HYDROCHLORIDE 100 MG/1
100 TABLET, FILM COATED ORAL DAILY
Status: CANCELLED | OUTPATIENT
Start: 2024-10-12

## 2024-10-11 RX ORDER — BISACODYL 10 MG
10 SUPPOSITORY, RECTAL RECTAL DAILY PRN
Status: DISCONTINUED | OUTPATIENT
Start: 2024-10-11 | End: 2024-10-18 | Stop reason: HOSPADM

## 2024-10-11 RX ORDER — HYDROCHLOROTHIAZIDE 25 MG/1
25 TABLET ORAL DAILY
Qty: 30 TABLET | Refills: 3 | Status: SHIPPED | OUTPATIENT
Start: 2024-10-12

## 2024-10-11 RX ORDER — OXYCODONE AND ACETAMINOPHEN 5; 325 MG/1; MG/1
1 TABLET ORAL EVERY 6 HOURS PRN
Qty: 20 TABLET | Refills: 0 | Status: ON HOLD | OUTPATIENT
Start: 2024-10-11 | End: 2024-10-17 | Stop reason: HOSPADM

## 2024-10-11 RX ORDER — HYDROCHLOROTHIAZIDE 25 MG/1
25 TABLET ORAL DAILY
Status: DISCONTINUED | OUTPATIENT
Start: 2024-10-12 | End: 2024-10-18 | Stop reason: HOSPADM

## 2024-10-11 RX ORDER — CALCIUM CARBONATE 500 MG/1
500 TABLET, CHEWABLE ORAL 3 TIMES DAILY PRN
Status: DISCONTINUED | OUTPATIENT
Start: 2024-10-11 | End: 2024-10-18 | Stop reason: HOSPADM

## 2024-10-11 RX ORDER — LEVOTHYROXINE SODIUM 50 UG/1
50 TABLET ORAL
Status: CANCELLED | OUTPATIENT
Start: 2024-10-12

## 2024-10-11 RX ORDER — ACETAMINOPHEN 325 MG/1
650 TABLET ORAL EVERY 6 HOURS PRN
Status: DISCONTINUED | OUTPATIENT
Start: 2024-10-11 | End: 2024-10-18 | Stop reason: HOSPADM

## 2024-10-11 RX ORDER — LANOLIN ALCOHOL/MO/W.PET/CERES
3 CREAM (GRAM) TOPICAL NIGHTLY PRN
Status: DISCONTINUED | OUTPATIENT
Start: 2024-10-11 | End: 2024-10-14

## 2024-10-11 RX ORDER — PANTOPRAZOLE SODIUM 40 MG/1
40 TABLET, DELAYED RELEASE ORAL
Status: DISCONTINUED | OUTPATIENT
Start: 2024-10-12 | End: 2024-10-18 | Stop reason: HOSPADM

## 2024-10-11 RX ORDER — FERROUS SULFATE 325(65) MG
325 TABLET ORAL
Status: CANCELLED | OUTPATIENT
Start: 2024-10-12

## 2024-10-11 RX ORDER — LOSARTAN POTASSIUM 50 MG/1
100 TABLET ORAL DAILY
Status: CANCELLED | OUTPATIENT
Start: 2024-10-12

## 2024-10-11 RX ORDER — HYDRALAZINE HYDROCHLORIDE 25 MG/1
25 TABLET, FILM COATED ORAL EVERY 8 HOURS SCHEDULED
Qty: 90 TABLET | Refills: 3 | Status: ON HOLD | OUTPATIENT
Start: 2024-10-11 | End: 2024-10-17 | Stop reason: HOSPADM

## 2024-10-11 RX ORDER — HYDRALAZINE HYDROCHLORIDE 25 MG/1
25 TABLET, FILM COATED ORAL EVERY 8 HOURS SCHEDULED
Status: DISCONTINUED | OUTPATIENT
Start: 2024-10-11 | End: 2024-10-14

## 2024-10-11 RX ORDER — LOSARTAN POTASSIUM 100 MG/1
100 TABLET ORAL DAILY
Qty: 30 TABLET | Refills: 3 | Status: SHIPPED | OUTPATIENT
Start: 2024-10-12

## 2024-10-11 RX ORDER — OXYCODONE HYDROCHLORIDE 5 MG/1
5 TABLET ORAL EVERY 6 HOURS PRN
Status: CANCELLED | OUTPATIENT
Start: 2024-10-11

## 2024-10-11 RX ADMIN — LEVOTHYROXINE SODIUM 50 MCG: 0.05 TABLET ORAL at 05:52

## 2024-10-11 RX ADMIN — FERROUS SULFATE TAB 325 MG (65 MG ELEMENTAL FE) 325 MG: 325 (65 FE) TAB at 11:26

## 2024-10-11 RX ADMIN — ATORVASTATIN CALCIUM 40 MG: 40 TABLET, FILM COATED ORAL at 21:02

## 2024-10-11 RX ADMIN — Medication 3 MG: at 22:11

## 2024-10-11 RX ADMIN — ENOXAPARIN SODIUM 40 MG: 100 INJECTION SUBCUTANEOUS at 08:50

## 2024-10-11 RX ADMIN — HYDRALAZINE HYDROCHLORIDE 25 MG: 25 TABLET ORAL at 21:02

## 2024-10-11 RX ADMIN — HYDRALAZINE HYDROCHLORIDE 25 MG: 25 TABLET ORAL at 13:24

## 2024-10-11 RX ADMIN — AMLODIPINE BESYLATE 10 MG: 10 TABLET ORAL at 08:50

## 2024-10-11 RX ADMIN — HYDRALAZINE HYDROCHLORIDE 25 MG: 25 TABLET ORAL at 08:50

## 2024-10-11 RX ADMIN — HYDROCHLOROTHIAZIDE 25 MG: 25 TABLET ORAL at 08:49

## 2024-10-11 RX ADMIN — PANTOPRAZOLE SODIUM 40 MG: 40 INJECTION, POWDER, FOR SOLUTION INTRAVENOUS at 08:51

## 2024-10-11 RX ADMIN — ACETAMINOPHEN 650 MG: 325 TABLET ORAL at 16:36

## 2024-10-11 RX ADMIN — LOSARTAN POTASSIUM 100 MG: 50 TABLET, FILM COATED ORAL at 08:50

## 2024-10-11 RX ADMIN — SERTRALINE 100 MG: 100 TABLET, FILM COATED ORAL at 08:50

## 2024-10-11 ASSESSMENT — PAIN DESCRIPTION - ONSET: ONSET: ON-GOING

## 2024-10-11 ASSESSMENT — PAIN SCALES - GENERAL
PAINLEVEL_OUTOF10: 1
PAINLEVEL_OUTOF10: 0
PAINLEVEL_OUTOF10: 0
PAINLEVEL_OUTOF10: 4
PAINLEVEL_OUTOF10: 0

## 2024-10-11 ASSESSMENT — PAIN DESCRIPTION - LOCATION: LOCATION: ABDOMEN

## 2024-10-11 ASSESSMENT — PAIN - FUNCTIONAL ASSESSMENT: PAIN_FUNCTIONAL_ASSESSMENT: PREVENTS OR INTERFERES SOME ACTIVE ACTIVITIES AND ADLS

## 2024-10-11 ASSESSMENT — PAIN DESCRIPTION - DESCRIPTORS: DESCRIPTORS: ACHING

## 2024-10-11 ASSESSMENT — PAIN DESCRIPTION - PAIN TYPE: TYPE: SURGICAL PAIN

## 2024-10-11 ASSESSMENT — PAIN DESCRIPTION - FREQUENCY: FREQUENCY: INTERMITTENT

## 2024-10-11 NOTE — PROGRESS NOTES
Patient has been accepted by physiatry for admission to [facility] and will plan to admit early next week pending medical stability.

## 2024-10-11 NOTE — CARE COORDINATION
Patient with discharge orders for today. Patient to transfer to 9th floor IR at [facility].  RN to call IRC for room #. No additional needs made known to CM. Patient has met all treatment goals and milestones for discharge. CM following until patient is discharged.        10/11/24 7203   Services At/After Discharge   Transition of Care Consult (CM Consult) Acute Rehab   Services At/After Discharge Inpatient rehab   [place] Resource Information Provided? No   Condition of Participation: Discharge Planning   The Plan for Transition of Care is related to the following treatment goals: Patient to transfer to 9th floor [facility]   The Patient and/or Patient Representative was provided with a Choice of Provider? Patient   Freedom of Choice list was provided with basic dialogue that supports the patient's individualized plan of care/goals, treatment preferences, and shares the quality data associated with the providers?  Yes

## 2024-10-11 NOTE — DISCHARGE SUMMARY
[place] [address]  [phone]   Discharge Summary     Estefanía Hope  MRN: [id number]     : 1950     Age: 73 y.o.                          Admit date: 10/3/2024     Discharge date: 10/11/2024  Attending Physician: Crow Montero MD  Primary Discharge Diagnosis:   Principal Problem:    Small bowel obstruction (HCC)  Active Problems:    HTN (hypertension)    Hypothyroidism    Leukocytosis    Ischemia, bowel (HCC)    Septic shock (HCC)    Encounter for weaning from ventilator (HCC)    Ischemic bowel disease (HCC)    SBO (small bowel obstruction) (HCC)    Pleural effusion  Resolved Problems:    * No resolved hospital problems. *    Primary Operations or Procedures Performed :  Procedure(s):  10/6/24 EXPLORATORY LAPAROTOMY, REMOVAL OF ABTHERA WOUND DRESSING, RESECTION OF SMALL BOWEL AND SMALL BOWEL ANASTOMOSIS. Dr. Montero     10/4/24 EXPLORATORY LAPAROSCOPY CONVERTED TO OPEN. SMALL BOWEL RESECTION WITH ABTHERA WOUND DRESSING AND VAC APPLIED. Dr. Durham     Brief History and Reason for Admission: Estefanía Hope was admitted with the following history of present illness.  10/4/24 HPI: Estefanía Hope is a 73 y.o. female who is asked in consultation by Dr. Amaya (hospitalist) to see with small bowel obstruction with large hiatal hernia.    The patient has a PMHx of A-fib (s/p ablation ), anemia (takes iron), arthritis, asthma, chronic pain (back), GERD, HLD, HTN, morbid obesity, nausea and vomiting, depression, and hypothyroidism.   She presented 10/3/24 with acute onset (the day of 10/3/2024) of severe abdominal pain with associated nausea and bilious vomiting.  Abdominal pain is described as upper abdomen, constant, 10/10 at worst, aching, sore, aggravated by movement, alleviated by nothing.  Patient has never experienced abdominal pain similar.  She endorses history of constipation with approximately 3 bowel movements per week.  She  uses Benefiber for constipation.  Prior to 10/3/2024, she was tolerating diet and having normal bowel function.  She denies chronic nausea; however nausea and vomiting is listed on her medical history.  She denied fevers, chills, hematemesis, coffee grounds emesis, melena, or hematochezia  In the ED 10/4/2024, afebrile.  Tachycardia QZ=917l; otherwise VSS.  Lactic acid 3.4.  WBC 19.07.  CMP was hemolyzed  CT ABD/PELV 10/3/2024 revealed: Small bowel obstruction large hiatal hernia containing stomach portion of large bowel.     She was admitted by the hospitalist service on 10/3/2024 for small bowel obstruction.      NGT LIS was placed.  Patient is n.p.o. NGT/LIS at time of consultation.  Patient is not taking anticoagulation.  (Status post A-fib ablation 2008).  Previous abdominal surgeries: Umbilical hernia repair with mesh, hysterectomy, appendectomy with tubal ligation (1972)     Hospital Course:    The patient underwent   10/6/24 EXPLORATORY LAPAROTOMY, REMOVAL OF ABTHERA WOUND DRESSING, RESECTION OF SMALL BOWEL AND SMALL BOWEL ANASTOMOSIS. Dr. Montero     10/4/24 EXPLORATORY LAPAROSCOPY CONVERTED TO OPEN. SMALL BOWEL RESECTION WITH ABTHERA WOUND DRESSING AND VAC APPLIED. Dr. Durham     TPN  Ileus   Hospitalist followed in consultation   The patient progressed satisfactorily meeting milestones necessary for successful discharge including tolerating a diet, adequate mobility, adequate pain control, and active bowel function. Patient was deemed a good candidate for discharge at the time of morning rounding. They are to follow up as indicated in their provided discharge paperwork. The patient helped develop and voices understanding with the plan of care. They are amenable without reservations at this time to moving forward with discharge.     Condition at Discharge: Good    Discharge Medications:   Current Discharge Medication List        CONTINUE these medications which have NOT CHANGED    Details   CALCIUM PO

## 2024-10-11 NOTE — PROGRESS NOTES
PM&R acute rehab referral brief follow-up note    Please see my initial consultation dated 10/9/2024    Chart review only today-    Interim-  Medically stabilizing.  Upgraded to clear liquid diet 10/10.  Remains on TPN supplementation as of today - wean in progress.    Improved performance with PT on 10/10-mod assist to min assist for bed mobility and transfers, walking 8 feet min assist with a rolling walker.    Impr:  //Gait and Self-care deficits in setting of debility secondary to complex medical-surgical hospital course (bowel perforation and multiple procedural interventions)  //Decreased independence with ADLs  //Gait instability  //Hypoxia-improved?    Recommendation:  I anticipate that once medically stable, the patient will be appropriate for acute rehabilitation as a bridge to community discharge.  Current medical barrier to admission: Nutrition plan/ patient continues to require TPN- we generally will not manage this on an acute rehab level of care.  Patient must demonstrate she is meeting nutritional needs off of TPN.  Anticipated date of acute rehab admission: Monday 10/14? Would not need insurance auth.    Medical issues requiring close management on acute rehab:  Bowel perforation status post complex operative repair  Anemia  Hypertension  Postoperative pain  High risk for malnutrition    I will continue to follow along to coordinate appropriate timing for transfer once patient medically stable.    Nnamdi Sargent MD  10/11/2024    Addendum:  TPN discontinued today.  Tolerating all PO intake, advanced to SBS diet today.  Discussed with Dr. Montero, patient stable for transfer today.  Patient assessed at bedside. Clinically she looks great without concerning GI symptoms at this time, excellent rehab potential.    OK to transfer to acute rehab today.

## 2024-10-11 NOTE — PROGRESS NOTES
Comprehensive Nutrition Assessment    Type and Reason for Visit: Reassess  Parenteral Nutrition Management (Pulmonology)    Nutrition Recommendations/Plan:   Parenteral Nutrition:  Central parenteral nutrition    Central line infusion  Change: Dex 5%, 4.25% AA - reduce infusion rate to 45 ml/hr and discontinue after 2 hours. Will not reorder PN this evening.  Discontinue 250 ml 20% lipids daily  Nutrition Related Medication Management:  Electrolyte Replacement Protocol PRN Continue for Magnesium, Potassium, and Phosphorus  IVF:  N/A  Labs:   Discontinue PN labs  Meals and Snacks:  Diet: Continue current diet and advance as medically appropriate  Oral Nutriton Supplement Therapy:   Medical food supplement therapy:  Modify Ensure Enlive (high calorie high protein) 350 calories, 20 grams protein per 8 ounce serving      Malnutrition Assessment:  Malnutrition Status: At risk for malnutrition (Comment) (S/p open abdominal surgery x2, NPO)  C  Mild temporal wasting  Nutrition Assessment:  Nutrition History: Daughter at bedside provides history. Usual intake is 2 meals per day. No changes prior to acute events. Symptoms started day of admission.     Do You Have Any Cultural, Pentecostalism, or Ethnic Food Preferences?: No   Weight History: Very limited. Last office visit 195# on 9/1/23. Daughter reports minimal weight changes.  Nutrition Background:       PMH: HTN, hypothyroidism. Presented with abdominal pain and N/V. CT with SBO and hiatal hernia. Small bowel resection with abthera wound dressing and wound vac 10/4. Returned to OR 10/6 for additional small bowel removal and closure of abdominal wound.   Nutrition Interval:  PICC placed 10/5. TPN + lipids started 10/7. Advanced to goal 10/9. NGT found to be in 2nd portion duodenum and was removed, CLD initiated. 10/11 PN wean initiated.    Patient sitting up in bed with daughter at bedside. She is alert and oriented. She has been tolerating CLD and Ensure Clear. Denies any  85 grams of protein/day (100% of needs), 100 grams of CHO/day and ~2250 ml of total volume/day    Current Intake:   Average Meal Intake: 51-75% (CLD)        Anthropometric Measures:  Height: 167.6 cm (5' 6\")  Current Body Wt: 90.5 kg (199 lb 8.3 oz) (10/11), Weight source: Bed Scale  BMI: 32.2, Obese Class 1 (BMI 30.0-34.9)  Admission Body Weight: 83.9 kg (184 lb 15.5 oz) (10/4 stated)  Ideal Body Weight (Kg) (Calculated): 59 kg (130 lbs), 159.1 %  BMI Category Obese Class 1 (BMI 30.0-34.9)  Estimated Daily Nutrient Needs:  Energy (kcal/day): 1253-9162 (25-30 kcal/kg) (Kcal/kg Weight used: 59.1 kg Ideal  Protein (g/day): 77-89 (1.3-1.5 g/kg) Weight Used: (Ideal) 59.1 kg  Fluid (ml/day):   (1 ml/kcal)    Nutrition Diagnosis:   Inadequate oral intake related to altered GI function as evidenced by  (s/p surgery with slow diet advance)  Nutrition Interventions:   Food and/or Nutrient Delivery: Continue Current Diet, Modify Oral Nutrition Supplement, Discontinue Parenteral Nutrition     Coordination of Nutrition Care: Continue to monitor while inpatient      Goals:   Previous Goal Met: Goal(s) Achieved (New goal)  Active Goal: PO intake 50% or greater, by next RD assessment       Nutrition Monitoring and Evaluation:      Food/Nutrient Intake Outcomes: Food and Nutrient Intake, Supplement Intake  Physical Signs/Symptoms Outcomes: Biochemical Data, GI Status, Meal Time Behavior, Weight    Discharge Planning:    Continue Oral Nutrition Supplement    DANITZA NESS, RD  [phone]

## 2024-10-11 NOTE — PROGRESS NOTES
Hospitalist Progress Note   Admit Date:  10/3/2024 10:59 PM   Name:  Estefanía Hope   Age:  73 y.o.  Sex:  female  :  1950   MRN:  [id number]   Room:  /    Presenting/Chief Complaint: No chief complaint on file.     Reason(s) for Admission: SBO (small bowel obstruction) (HCC) [K56.609]     Hospital Course:   Estefanía Hope is a 73 y.o. female with medical history of HTN, hypothyroidism who presented to ED with abdominal pain.  CT abdomen revealed SBO with large hiatal hernia.  Patient was transferred to the ICU due to hypotension, needing pressors.  Surgery was consulted.  She is status post ex lap converted to open bowel resection on 10/4, repeat on 10/6.  She was intubated and extubated and downgraded to the floors.  Hospitalist consulted for medical management.      Subjective & 24hr Events:   Seen and examined at bedside this morning.  No acute events overnight.  Patient started on a clear liquid diet.    Assessment & Plan:   Small bowel obstruction (HCC)  Ischemia, bowel (HCC)  Septic shock (HCC)  Status post bowel resection on 10/4, 10/6  NG tube in place  Continue TPN  Continue management per surgery     HTN (hypertension)  Hypothyroidism  On losartan, hydrochlorothiazide, amlodipine  Started on hydralazine 25 mg every 8 hours  Hydralazine as needed  Continue levothyroxine      Anticipated Discharge Arrangements:   Defer to primary team    PT/OT evals ordered?  Defer to primary team  Diet:  PN-Adult Premix 4.25/5 - Peripheral Line  ADULT DIET; Dysphagia - Soft and Bite Sized; 4 carb choices (60 gm/meal); Low Fiber  ADULT ORAL NUTRITION SUPPLEMENT; Breakfast, Lunch, Dinner; Diabetic Oral Supplement  VTE prophylaxis: Defer to primary team  Code status: Full Code      Non-peripheral Lines and Tubes (if present):      External Urinary Catheter (Active)     PICC 10/05/24 Right Basilic (Active)       Telemetry (if present):  Cardiac/Telemetry Monitor On: [facility]  dextrose 10 % infusion   IntraVENous Continuous PRN    dextrose 50 % IV solution  12.5 g IntraVENous PRN    Or    dextrose 50 % IV solution  25 g IntraVENous PRN    amLODIPine (NORVASC) tablet 10 mg  10 mg Oral Daily    PN-Adult Premix 4.25/5 - Peripheral Line   IntraVENous Continuous TPN    losartan (COZAAR) tablet 100 mg  100 mg Oral Daily    And    hydroCHLOROthiazide (HYDRODIURIL) tablet 25 mg  25 mg Oral Daily    hydrALAZINE (APRESOLINE) injection 10 mg  10 mg IntraVENous Q6H PRN    atorvastatin (LIPITOR) tablet 40 mg  40 mg Oral Nightly    sodium phosphate 15 mmol in sodium chloride 0.9 % 250 mL IVPB  15 mmol IntraVENous PRN    morphine injection 4 mg  4 mg IntraVENous Q3H PRN    fat emulsion (INTRALIPID/NUTRILIPID) 20 % infusion 250 mL  250 mL IntraVENous Daily    potassium chloride 20 mEq/50 mL IVPB (Central Line)  20 mEq IntraVENous PRN    Or    potassium chloride 10 mEq/100 mL IVPB (Peripheral Line)  10 mEq IntraVENous PRN    albuterol (PROVENTIL) (2.5 MG/3ML) 0.083% nebulizer solution 2.5 mg  2.5 mg Nebulization Q6H PRN    pantoprazole (PROTONIX) 40 mg in sodium chloride (PF) 0.9 % 10 mL injection  40 mg IntraVENous Daily    sodium chloride flush 0.9 % injection 5-40 mL  5-40 mL IntraVENous PRN    lidocaine 1 % injection 50 mg  50 mg IntraDERmal Once    sodium chloride flush 0.9 % injection 5-40 mL  5-40 mL IntraVENous 2 times per day    sodium chloride flush 0.9 % injection 5-40 mL  5-40 mL IntraVENous PRN    ferrous sulfate (IRON 325) tablet 325 mg  325 mg Oral Lunch    levothyroxine (SYNTHROID) tablet 50 mcg  50 mcg Oral QAM AC    sertraline (ZOLOFT) tablet 100 mg  100 mg Oral Daily    sodium chloride flush 0.9 % injection 5-40 mL  5-40 mL IntraVENous PRN    magnesium sulfate 2000 mg in 50 mL IVPB premix  2,000 mg IntraVENous PRN    enoxaparin (LOVENOX) injection 40 mg  40 mg SubCUTAneous Daily    ondansetron (ZOFRAN-ODT) disintegrating tablet 4 mg  4 mg Oral Q8H PRN    Or    ondansetron (ZOFRAN)

## 2024-10-11 NOTE — DISCHARGE INSTRUCTIONS
[facility] Discharge Instructions/Follow-up Plans:   MD Instructions:     Follow-up with Aguilar BOTELLO (General Surgery) in 2 weeks.     Incisions  Keep incisions clean and dry, may remain uncovered.  Do not apply lotions, creams or ointments to incisions.  Showers are allowed - gently wash and pat dry your incisions.   No tub baths or soaking/submerging until cleared by follow up provider.       Diet -   Low fiber/residue diet for 2 weeks.     Activity   No heavy lifting >10 lb for the first week after surgery. Lift nothing heavier than 25 lbs for 4  weeks after surgery.   Walking and non-strenuous exercise promotes good oxygenated blood supply to body tissues and will assist in recovery. Walking and non-strenuous exercise also promotes good lung mechanics and reduces chance of developing pneumonia.     Medications  No driving while taking narcotics/(prescription strength pain medication).  Do not drink alcohol while taking narcotics.  Resume other home medications.     Narcotic pain medication is known to cause constipation as narcotic pain medication slows gut motility. Even if you are not taking oral narcotic pain medication, you have likely been given narcotic pain medication intravenously during your surgical procedure.     Do not get constipated!  No more than 1 day without a bm. If 1 day no bm, then take colace stool softener twice a day. If 24 hours of taking colace stool softener does not produce a bm , then keep taking colace and add miralax laxative twice a day until you have a bm. Once you are having regular bms, you can use colace and/or miralax as needed to ensure you have a regular daily bm.        If problems (fever, signs of infection, uncontrolled bleeding or drainage from surgical incision, uncontrolled pain, uncontrolled nausea and/or vomiting) or questions arise, please call our office at [phone] [facility] Office

## 2024-10-11 NOTE — DISCHARGE SUMMARY
[place] [address]  [phone]   Discharge Summary     Estefanía Hope  MRN: [id number]     : 1950     Age: 73 y.o.                          Admit date: 10/3/2024     Discharge date: 10/11/2024  Attending Physician: Crow Montero MD  Primary Discharge Diagnosis:   Principal Problem:    Small bowel obstruction (HCC)  Active Problems:    HTN (hypertension)    Hypothyroidism    Leukocytosis    Ischemia, bowel (HCC)    Septic shock (HCC)    Encounter for weaning from ventilator (HCC)    Ischemic bowel disease (HCC)    SBO (small bowel obstruction) (HCC)    Pleural effusion  Resolved Problems:    * No resolved hospital problems. *    Primary Operations or Procedures Performed :  Procedure(s):  10/6/24 EXPLORATORY LAPAROTOMY, REMOVAL OF ABTHERA WOUND DRESSING, RESECTION OF SMALL BOWEL AND SMALL BOWEL ANASTOMOSIS. Dr. Montero     10/4/24 EXPLORATORY LAPAROSCOPY CONVERTED TO OPEN. SMALL BOWEL RESECTION WITH ABTHERA WOUND DRESSING AND VAC APPLIED. Dr. Durham       Brief History and Reason for Admission: Estefanía Hope was admitted with the following history of present illness.  10/4/24 HPI: Estefanía Hope is a 73 y.o. female who is asked in consultation by Dr. Amaya (hospitalist) to see with small bowel obstruction with large hiatal hernia.    The patient has a PMHx of A-fib (s/p ablation ), anemia (takes iron), arthritis, asthma, chronic pain (back), GERD, HLD, HTN, morbid obesity, nausea and vomiting, depression, and hypothyroidism.   She presented 10/3/24 with acute onset (the day of 10/3/2024) of severe abdominal pain with associated nausea and bilious vomiting.  Abdominal pain is described as upper abdomen, constant, 10/10 at worst, aching, sore, aggravated by movement, alleviated by nothing.  Patient has never experienced abdominal pain similar.  She endorses history of constipation with approximately 3 bowel movements per week.

## 2024-10-11 NOTE — PROGRESS NOTES
Admit Date: 10/3/2024  10/6/24 EXPLORATORY LAPAROTOMY, REMOVAL OF ABTHERA WOUND DRESSING, RESECTION OF SMALL BOWEL AND SMALL BOWEL ANASTOMOSIS. Dr. Montero    10/4/24 EXPLORATORY LAPAROSCOPY CONVERTED TO OPEN. SMALL BOWEL RESECTION WITH ABTHERA WOUND DRESSING AND VAC APPLIED. Dr. Durham     Subjective:     Pt awake and sitting up in bed. No current complaints or acute events overnight. Pain controlled. TPN in progress. Tolerating CLD. No nausea or vomiting. Reports +flatus/+BM.  Has been working with PT.  Planning to go to Rehab 9th floor when bed available.     Objective:       Vitals:    10/10/24 2350 10/11/24 0421 10/11/24 0559 10/11/24 0736   BP: (!) 165/74 (!) 175/71  (!) 169/82   Pulse: 88 87  96   Resp: 18 18  18   Temp: 98.1 °F (36.7 °C) 98.1 °F (36.7 °C)  99 °F (37.2 °C)   TempSrc: Oral Oral  Oral   SpO2: 96% 96%  93%   Weight:   90.5 kg (199 lb 9.6 oz)    Height:           Temp (24hrs), Av.3 °F (36.8 °C), Min:98 °F (36.7 °C), Max:99 °F (37.2 °C)  .  I&O reviewed as documented.      Physical Exam  Constitutional:       General: She is not in acute distress.  HENT:      Mouth/Throat:      Mouth: Mucous membranes are moist.   Cardiovascular:      Rate and Rhythm: Normal rate and regular rhythm.      Pulses: Normal pulses.      Heart sounds: Normal heart sounds. No murmur heard.     No friction rub. No gallop.   Pulmonary:      Effort: Pulmonary effort is normal. No respiratory distress.      Breath sounds: Normal breath sounds.      Comments: Room air  Abdominal:      General: Bowel sounds are normal. There is no distension.      Palpations: Abdomen is soft.   Genitourinary:     Comments: External guerrero - 1550mL 24hr output  Musculoskeletal:         General: No swelling. Normal range of motion.      Cervical back: No rigidity.   Skin:     General: Skin is warm and dry.      Capillary Refill: Capillary refill takes less than 2 seconds.      Comments: Midline abdominal incision RUQ laparoscopic site C/D/I  with no signs of infection.   Neurological:      Mental Status: She is alert and oriented to person, place, and time.   Psychiatric:         Behavior: Behavior normal.          Labs:   Recent Results (from the past 24 hour(s))   POCT Glucose    Collection Time: 10/10/24  5:08 PM   Result Value Ref Range    POC Glucose 144 (H) 65 - 100 mg/dL    Performed by: Kasie    Basic Metabolic Panel    Collection Time: 10/11/24  4:32 AM   Result Value Ref Range    Sodium 136 136 - 145 mmol/L    Potassium 3.8 3.5 - 5.1 mmol/L    Chloride 103 98 - 107 mmol/L    CO2 23 20 - 28 mmol/L    Anion Gap 11 9 - 18 mmol/L    Glucose 125 (H) 70 - 99 mg/dL    BUN 15 8 - 23 MG/DL    Creatinine 0.64 0.60 - 1.10 MG/DL    Est, Glom Filt Rate >90 >60 ml/min/1.73m2    Calcium 8.9 8.8 - 10.2 MG/DL   Magnesium    Collection Time: 10/11/24  4:32 AM   Result Value Ref Range    Magnesium 2.0 1.8 - 2.4 mg/dL   Phosphorus    Collection Time: 10/11/24  4:32 AM   Result Value Ref Range    Phosphorus 3.1 2.5 - 4.5 MG/DL   POCT Glucose    Collection Time: 10/11/24  5:57 AM   Result Value Ref Range    POC Glucose 113 (H) 65 - 100 mg/dL    Performed by: Shyam          Assessment:     Principal Problem:    Small bowel obstruction (HCC)  Active Problems:    HTN (hypertension)    Hypothyroidism    Leukocytosis    Ischemia, bowel (HCC)    Septic shock (HCC)    Encounter for weaning from ventilator (HCC)    Ischemic bowel disease (HCC)    SBO (small bowel obstruction) (HCC)    Pleural effusion  Resolved Problems:    * No resolved hospital problems. *        Plan/Recommendations/Medical Decision Making:     PLAN    Advance to soft diet  With nutritional supplementation.  FSBS before meals and at bedtime   Hypoglycemia treatment protocol ordered    Weaning TPN (can stop if pt transfers to 9th floor)    Ok to DC to [facility] when bed available.   CM assisting with discharge disposition.     Kimberly A Frommel, JENNIFER

## 2024-10-11 NOTE — PRE-CERTIFICATION NOTE
[facility]  Pre-admission Assessment    Facility Information: [facility]  Patient Name: Estefanía Campos        MRN: [id number]    : 1950 (73 y.o.)  Gender: female   Ethnicity:Not of , /a, or [place]ic origin  Race:White    COVERAGE INFORMATION  Active Insurance as of 10/3/2024       Primary Coverage       Payor Plan Insurance Group Employer/Plan Group    MEDICARE MEDICARE PART A AND B        Payor Address Payor Phone Number Payor Fax Number Effective Dates    [address] [address] [phone]  2013 - None Entered    [facility] [id number]         Subscriber Name Subscriber Birth Date Member ID       ESTEFANÍA CAMPOS 1950 [id number]               Secondary Coverage       Payor Plan Insurance Group Employer/Plan Group    BCBS [employer] BCBS [facility]        Payor Plan Address Payor Plan Phone Number Payor Plan Fax Number Effective Dates    [address]   1995 - None Entered    [facility] [id number]         Subscriber Name Subscriber Birth Date Member ID       ESTEFANÍA CAMPOS 1950 [id number]                   Update Due:     PHYSICIAN/REFERRAL INFORMATION  Attending Physician: Crow Montero MD   Admitted From: [facility]  Date of Admission to the Hospital: 10/3/2024 10:59 PM  Date Patient Eligible for Admission: 10/11/2024    PRIOR LIVING SITUATION/LEVEL OF FUNCTION:  Social/Functional History  Lives With: Family (lives in [place], but was previously living with sister and here visiting.)  Home Equipment: Cane  Receives Help From: Family  ADL Assistance: Independent  Homemaking Assistance: Independent  Homemaking Responsibilities: Yes  Ambulation Assistance: Independent  Transfer Assistance: Independent  Active : Yes   Has lack of transportation kept you from medical appointments, meetings, work, or from getting things needed for daily living? (Check all that apply.)  No    REHABILITATION DIAGNOSIS/PMH:  Primary Diagnosis: SBO

## 2024-10-11 NOTE — H&P
Normal.    - ADRENALS: Normal.  - KIDNEYS/URETERS: Small right kidney with a 1.5 cm exophytic right renal cyst.  No hydronephrosis or significant mass.  - BLADDER: Normal.  - REPRODUCTIVE ORGANS: The uterus has been removed. No pelvic masses.    - BOWEL: A large hiatal hernia containing the stomach and distal transverse  colon. Small bowel obstruction in the left abdomen. Sigmoid diverticulosis. No  inflammatory changes.  - LYMPH NODES: No significant retroperitoneal, mesenteric, or pelvic adenopathy.  - BONES: Severe compression fracture of L1 vertebral body with retropulsion  causing narrowing of the spinal canal at this location.  - VASCULATURE: Atherosclerotic changes of the blood vessel with vascular  calcifications.  - OTHER: A small amount of free fluid in the abdomen and pelvis.    Impression  A large hiatal hernia containing the stomach and distal transverse colon. Small  bowel obstruction in the left abdomen. Sigmoid diverticulosis.    Small right kidney with a 1.5 cm exophytic right renal cyst.    Severe compression fracture of L1 vertebral body with retropulsion causing  narrowing of the spinal canal at this location.    A small amount of free fluid in the abdomen and pelvis.      If providers have any questions about this report, I can be reached on  [employer].      Electronically signed by TERRANCE CHOW       Xray Result (most recent):  XR ABDOMEN (KUB) (SINGLE AP VIEW) 10/09/2024    Narrative  KUB    INDICATION:  ngt placement    COMPARISON: Reviewed    TECHNIQUE: Supine views of the abdomen were obtained.    FINDINGS:    Enteric tube courses below the left hemidiaphragm and projects to the right of  the midline suggesting placement in the second portion of the duodenum.  Correlate clinically.    There is a normal bowel gas pattern. There is no evidence of obstruction.  No  renal calculi are seen. Lung bases are clear.    Impression  As above.    Electronically signed by Harpal Kinney    thought organization, problem solving, reasoning, word retrieval, and memory given recent injury. Therefore, patient may require continued routine follow up primarily by Speech Language Pathology in addition to Occupational Therapy to address potential underlying deficits and working on higher level-cognitive function with compensatory strategies as indicated.  -Deep venous thromboembolism: patient is at increased risk for thromboembolic event given recent injury, new mobility limitations and current hospitalization. Therefore, maintain on mechanical DVT prophylaxis and encourage progressive out of mobility while continuing routine monitoring for potential thromboembolic events.  -Bowel management: increased potential for recurrent constipation given mobility limitations, current hospitalization, and medication use. Will need to continue routine monitoring of bowel function with appropriate adjustment in bowel regimen as indicated to ensure adequate bowel management.  -Bladder management: Monitor urinary output and urinary function/dysfunction. Does have the potential for underlying bladder dysfunction given immobility. Therefore, requires continued routine monitoring with appropriate bladder management as indicated to avoid urinary tract complications.    Current Risks:   **Risk for Delirium given prolonged hospital stay.   **Risks for falls given recent immobility, weakness and fatigue  **Skin breakdown and pressure injury in the setting of significantly impaired mobility and/or impaired sensation      Fall precautions in place. Chair and bed alarms are set daily and nightly to discourage patient from getting up unattended. Discussed strategy to reduce the risk of falls which include understanding patient’s known fall risk factors and management of the risk factors identified, such as modification of home environment, avoiding or decreasing psychoactive medications. Sitting for 3-5 minutes prior to standing for

## 2024-10-12 PROCEDURE — 1180000000 HC REHAB R&B

## 2024-10-12 PROCEDURE — 6370000000 HC RX 637 (ALT 250 FOR IP): Performed by: PHYSICAL MEDICINE & REHABILITATION

## 2024-10-12 PROCEDURE — 6360000002 HC RX W HCPCS: Performed by: PHYSICAL MEDICINE & REHABILITATION

## 2024-10-12 PROCEDURE — 97161 PT EVAL LOW COMPLEX 20 MIN: CPT

## 2024-10-12 PROCEDURE — 97166 OT EVAL MOD COMPLEX 45 MIN: CPT

## 2024-10-12 PROCEDURE — 97535 SELF CARE MNGMENT TRAINING: CPT

## 2024-10-12 PROCEDURE — 97116 GAIT TRAINING THERAPY: CPT

## 2024-10-12 PROCEDURE — 97530 THERAPEUTIC ACTIVITIES: CPT

## 2024-10-12 RX ADMIN — LEVOTHYROXINE SODIUM 50 MCG: 0.05 TABLET ORAL at 06:18

## 2024-10-12 RX ADMIN — HYDROCHLOROTHIAZIDE 25 MG: 25 TABLET ORAL at 07:55

## 2024-10-12 RX ADMIN — FERROUS SULFATE TAB 325 MG (65 MG ELEMENTAL FE) 325 MG: 325 (65 FE) TAB at 13:21

## 2024-10-12 RX ADMIN — ENOXAPARIN SODIUM 40 MG: 100 INJECTION SUBCUTANEOUS at 07:55

## 2024-10-12 RX ADMIN — Medication 3 MG: at 20:21

## 2024-10-12 RX ADMIN — SERTRALINE 100 MG: 100 TABLET, FILM COATED ORAL at 07:55

## 2024-10-12 RX ADMIN — LOSARTAN POTASSIUM 100 MG: 50 TABLET, FILM COATED ORAL at 07:55

## 2024-10-12 RX ADMIN — HYDRALAZINE HYDROCHLORIDE 25 MG: 25 TABLET ORAL at 13:21

## 2024-10-12 RX ADMIN — AMLODIPINE BESYLATE 10 MG: 10 TABLET ORAL at 07:55

## 2024-10-12 RX ADMIN — PANTOPRAZOLE SODIUM 40 MG: 40 TABLET, DELAYED RELEASE ORAL at 06:18

## 2024-10-12 RX ADMIN — HYDRALAZINE HYDROCHLORIDE 25 MG: 25 TABLET ORAL at 06:18

## 2024-10-12 RX ADMIN — ATORVASTATIN CALCIUM 40 MG: 40 TABLET, FILM COATED ORAL at 20:20

## 2024-10-12 RX ADMIN — ACETAMINOPHEN 650 MG: 325 TABLET ORAL at 20:20

## 2024-10-12 RX ADMIN — HYDRALAZINE HYDROCHLORIDE 25 MG: 25 TABLET ORAL at 20:20

## 2024-10-12 ASSESSMENT — PAIN SCALES - GENERAL
PAINLEVEL_OUTOF10: 3
PAINLEVEL_OUTOF10: 0
PAINLEVEL_OUTOF10: 0

## 2024-10-12 ASSESSMENT — PAIN DESCRIPTION - LOCATION: LOCATION: GENERALIZED

## 2024-10-12 NOTE — CONSULTS
Comprehensive Nutrition Assessment    Type and Reason for Visit: Initial, Consult  General Nutrition Management/other reason (Physical Medicine)  \"s/p abdominal surgery diet advanced, monitor intermittently for meeting nutritional needs\"    Nutrition Recommendations/Plan:   Meals and Snacks:  Diet: Continue current order  Oral Nutriton Supplement Therapy:   Medical food supplement therapy:  Continue Ensure Enlive (high calorie high protein) 350 calories, 20 grams protein per 8 ounce serving      Malnutrition Assessment:  Malnutrition Status: At risk for malnutrition (Comment) (S/p abdominal surgery x2, likely prolonged admission)  NFPE unremarkable     Nutrition Assessment:  Nutrition History: Patient known to clinical nutrition from [facility] admission. Daughter reported at that usual intake is 2 meals per day. She was eating normally until day of admission. During admission was on TPN for 5 days prior to transfer to [facility] on a soft diet with Ensure Enlive TID.     Do You Have Any Cultural, Islam, or Ethnic Food Preferences?: No   Weight History: Very limited history. Last office visit was 9/1/23 195#. Daughter reported minimal weight changes  Nutrition Background:       PMH: HTN, hypothyroid. Presented to [facility] with abdominal pain and N/V. [place] to have SBO s/p small bowel resection with wound vac 10/4. Returned to OR 10/6 for additional small bowel resection and abdominal wound closure. Transfer to [facility] 10/11.  Nutrition Interval:  Patient up to recliner. She states she is tired but feeling much better. Observed lunch tray with most of vegetables and ~30% entree consumed. She was drinking her Ensure. For breakfast she had milk, Ensure, coffee, and a banana for breakfast. She states appetite is improving. Denied needs.     Current Nutrition Therapies:  ADULT DIET; Dysphagia - Soft and Bite Sized; 4 carb choices (60 gm/meal); Low Fiber  ADULT ORAL NUTRITION SUPPLEMENT; Breakfast, Lunch, Dinner; Standard High Calorie/High

## 2024-10-12 NOTE — PLAN OF CARE
Problem: Safety - Medical Restraint  Goal: Remains free of injury from restraints (Restraint for Interference with Medical Device)  Description: INTERVENTIONS:  1. Determine that other, less restrictive measures have been tried or would not be effective before applying the restraint  2. Evaluate the patient's condition at the time of restraint application  3. Inform patient/family regarding the reason for restraint  4. Q2H: Monitor safety, psychosocial status, comfort, nutrition and hydration  Outcome: Progressing     Problem: ABCDS Injury Assessment  Goal: Absence of physical injury  Outcome: Progressing     Problem: Safety - Adult  Goal: Free from fall injury  Outcome: Progressing     Problem: Pain  Goal: Verbalizes/displays adequate comfort level or baseline comfort level  Outcome: Progressing

## 2024-10-12 NOTE — PROGRESS NOTES
[facility] OCCUPATIONAL THERAPY INITIAL EVALUATION  OT Individual Minutes  Time In: 0750  Time Out: 0910  Minutes: 80               HPI (per MD report): \"Estefanía Hope is a 73 y.o. female patient who presented to [facility] on 10/3/2024 secondary to abdominal pain, with finding of SBO/internal hernia, ischemic bowel and septic shock. She required emergent ex-lap converted to open small bowel resection on 10/4, and ex-lap second look operation with bowel primary anastomosis on 10/6. She initially had slow to recover bowel function, although with excellent improvements in the last 2 days. She very briefly required TPN but is now on a soft and bite sized diet. Additional hospital issues: mild hypoxia (resolved), hypertension (ongoing), post-op anemia (stable). Patient seen at bedside today, with family present. Patient reports that pain has not been a significant issue post-operatively. She is passing gas. She does feel somewhat debilitated although is tolerating more activity today compared to prior. PT/OT assessed, noted good baseline of function, and recommended IRF once medically stable.   Co-morbidities: Hypothyroidism, chronic Left shoulder pain/OA/RTC injury     Of note, she lives with her daughter in [place], and was out her visiting her sister when she required hospitalization.  She is very independent at baseline, uses a cane as needed for community ambulation.  She does have a history of right wrist fracture approximately 1 year ago, with resulting deformity and resulting chronic pain creating some modest functional difficulty at her baseline.\"  Past Medical History:   Diagnosis Date    Anemia     taking iron    Arrhythmia     had ablation-2008- no problems since    Arthritis     osteoarthritis knees, fingers    Asthma      no problems except in fall season-- no meds regularly    Chronic pain     back    Gastrointestinal disorder     GERD    GERD (gastroesophageal reflux disease)     controlled with

## 2024-10-12 NOTE — PROGRESS NOTES
PHYSICAL THERAPY EXAMINATION    PT Individual Minutes  Time In: 1011  Time Out: 1115  Minutes: 64                   Total Treatment Time:  64 Minutes         HPI:  Estefanía Hope is a 73 y.o. female patient who presented to [facility] on 10/3/2024 secondary to abdominal pain, with finding of SBO/internal hernia, ischemic bowel and septic shock. She required emergent ex-lap converted to open small bowel resection on 10/4, and ex-lap second look operation with bowel primary anastomosis on 10/6. She initially had slow to recover bowel function, although with excellent improvements in the last 2 days. She very briefly required TPN but is now on a soft and bite sized diet. Additional hospital issues: mild hypoxia (resolved), hypertension (ongoing), post-op anemia (stable). Patient seen at bedside today, with family present. Patient reports that pain has not been a significant issue post-operatively. She is passing gas. She does feel somewhat debilitated although is tolerating more activity today compared to prior. PT/OT assessed, noted good baseline of function, and recommended IRF once medically stable.   Co-morbidities: Hypothyroidism, chronic Left shoulder pain/OA/RTC injury     Of note, she lives with her daughter in [place], and was out her visiting her sister when she required hospitalization.  She is very independent at baseline, uses a cane as needed for community ambulation.  She does have a history of right wrist fracture approximately 1 year ago, with resulting deformity and resulting chronic pain creating some modest functional difficulty at her baseline.    Past Medical History:   Past Medical History:   Diagnosis Date    Anemia     taking iron    Arrhythmia     had ablation-2008- no problems since    Arthritis     osteoarthritis knees, fingers    Asthma      no problems except in fall season-- no meds regularly    Chronic pain     back    Gastrointestinal disorder     GERD    GERD  .    Education:  A tour was provided with instruction in the POC, Rehab schedule, goals, room safety, call system, w/c management, transfer and gait techniques with assist.         Interdisciplinary Communication: RN and OT    Cognition:   Overall Orientation Status: Within Normal Limits  Orientation Level: Oriented X4  Overall Cognitive Status: WNL    Lower Extremity Function:     LLE RLE   ROM WFL WFL   Fine Motor Coordination Intact Impaired:  at heel strike   Tone Normal Normal   Sensation Intact Intact   Strength Grossly WFL hip flexion 3+ Grossly WFL hip flexion 3     PRIMARY MODE OF LOCOMOTION: Ambulation    Functional Assessment:    Balance  Comments   Static Sitting Normal:  Pt. able to maintain balance w/o UE support    Dynamic Sitting Good - accepts moderate challenge;  can maintain balance while picking object off the floor    Static Standing Good:  Pt. able to maintain balance w/o UE support;  exhibits some postural sway    Dynamic Standing Good - accepts moderate challenge;  can maintain balance while picking object off the floor        BED MOBILITY & TRANSFERS Quality Indicator Score Assistance Required  Comments   Rolling 6             Supine to Sit 6         Sit to Supine 4        Sit to/from Stand 4               Bed to/from Chair 4                  Car Transfer 4            WHEELCHAIR MOBILITY/MANAGEMENT Quality Indicator Score Assistance Required  Comments   Able to Propel 50' w/ 2 Turns 9        Able to Propel 150' 9      Wheelchair management            AMBULATION Quality Indicator Score AMBULATION Assessment   Assistive device Prior to Admission RW Pt ambulated 70' at time of fatigue setting in.           Walk 10' 4      Walk 50' with 2 Turns 4      Walk 150' 88      Walking 10' on Unlevel Surfaces 88      Picking Up Object From Floor 88            STEPS/STAIRS Quality Indicator Score STAIRS Assessment   1 Curb Step 3          4 Steps 3      12 Steps 88        Pt. Left in recliner call bell in

## 2024-10-13 PROCEDURE — 6360000002 HC RX W HCPCS: Performed by: PHYSICAL MEDICINE & REHABILITATION

## 2024-10-13 PROCEDURE — 1180000000 HC REHAB R&B

## 2024-10-13 PROCEDURE — 6370000000 HC RX 637 (ALT 250 FOR IP): Performed by: PHYSICAL MEDICINE & REHABILITATION

## 2024-10-13 RX ADMIN — ATORVASTATIN CALCIUM 40 MG: 40 TABLET, FILM COATED ORAL at 20:59

## 2024-10-13 RX ADMIN — LEVOTHYROXINE SODIUM 50 MCG: 0.05 TABLET ORAL at 06:12

## 2024-10-13 RX ADMIN — HYDROCHLOROTHIAZIDE 25 MG: 25 TABLET ORAL at 08:12

## 2024-10-13 RX ADMIN — HYDRALAZINE HYDROCHLORIDE 25 MG: 25 TABLET ORAL at 13:14

## 2024-10-13 RX ADMIN — PANTOPRAZOLE SODIUM 40 MG: 40 TABLET, DELAYED RELEASE ORAL at 06:12

## 2024-10-13 RX ADMIN — SERTRALINE 100 MG: 100 TABLET, FILM COATED ORAL at 08:12

## 2024-10-13 RX ADMIN — AMLODIPINE BESYLATE 10 MG: 10 TABLET ORAL at 08:12

## 2024-10-13 RX ADMIN — HYDRALAZINE HYDROCHLORIDE 25 MG: 25 TABLET ORAL at 06:12

## 2024-10-13 RX ADMIN — ENOXAPARIN SODIUM 40 MG: 100 INJECTION SUBCUTANEOUS at 08:12

## 2024-10-13 RX ADMIN — FERROUS SULFATE TAB 325 MG (65 MG ELEMENTAL FE) 325 MG: 325 (65 FE) TAB at 13:14

## 2024-10-13 RX ADMIN — LOSARTAN POTASSIUM 100 MG: 50 TABLET, FILM COATED ORAL at 08:12

## 2024-10-13 RX ADMIN — HYDRALAZINE HYDROCHLORIDE 25 MG: 25 TABLET ORAL at 20:59

## 2024-10-13 ASSESSMENT — PAIN SCALES - GENERAL
PAINLEVEL_OUTOF10: 0
PAINLEVEL_OUTOF10: 0

## 2024-10-13 NOTE — PLAN OF CARE
Problem: Safety - Medical Restraint  Goal: Remains free of injury from restraints (Restraint for Interference with Medical Device)  Description: INTERVENTIONS:  1. Determine that other, less restrictive measures have been tried or would not be effective before applying the restraint  2. Evaluate the patient's condition at the time of restraint application  3. Inform patient/family regarding the reason for restraint  4. Q2H: Monitor safety, psychosocial status, comfort, nutrition and hydration  10/12/2024 2128 by Nilda Whitlock RN  Outcome: Progressing  10/12/2024 0959 by Ting Gupta RN  Outcome: Progressing     Problem: ABCDS Injury Assessment  Goal: Absence of physical injury  10/12/2024 2128 by Nilda Whitlock RN  Outcome: Progressing  10/12/2024 0959 by Ting Gupta RN  Outcome: Progressing     Problem: Safety - Adult  Goal: Free from fall injury  10/12/2024 2128 by Nilda Whitlock RN  Outcome: Progressing  10/12/2024 0959 by Ting Gupta RN  Outcome: Progressing     Problem: Pain  Goal: Verbalizes/displays adequate comfort level or baseline comfort level  10/12/2024 2128 by Nilda Whitlock RN  Outcome: Progressing  10/12/2024 0959 by Ting Gupta RN  Outcome: Progressing

## 2024-10-14 ENCOUNTER — HOSPITAL ENCOUNTER (INPATIENT)
Age: 74
End: 2024-10-14
Attending: PHYSICAL MEDICINE & REHABILITATION | Admitting: PHYSICAL MEDICINE & REHABILITATION

## 2024-10-14 PROBLEM — Z74.09 IMPAIRED FUNCTIONAL MOBILITY, BALANCE, GAIT, AND ENDURANCE: Status: ACTIVE | Noted: 2024-10-03

## 2024-10-14 PROBLEM — Z78.9 DECREASED INDEPENDENCE WITH ACTIVITIES OF DAILY LIVING: Status: ACTIVE | Noted: 2024-10-03

## 2024-10-14 PROBLEM — Z51.89 ENCOUNTER FOR REHABILITATION: Status: ACTIVE | Noted: 2024-10-11

## 2024-10-14 LAB
ANION GAP SERPL CALC-SCNC: 10 MMOL/L (ref 9–18)
BASOPHILS # BLD: 0 K/UL (ref 0–0.2)
BASOPHILS NFR BLD: 1 % (ref 0–2)
BUN SERPL-MCNC: 16 MG/DL (ref 8–23)
CALCIUM SERPL-MCNC: 9.6 MG/DL (ref 8.8–10.2)
CHLORIDE SERPL-SCNC: 100 MMOL/L (ref 98–107)
CO2 SERPL-SCNC: 28 MMOL/L (ref 20–28)
CREAT SERPL-MCNC: 0.77 MG/DL (ref 0.6–1.1)
DIFFERENTIAL METHOD BLD: ABNORMAL
EOSINOPHIL # BLD: 0.2 K/UL (ref 0–0.8)
EOSINOPHIL NFR BLD: 2 % (ref 0.5–7.8)
ERYTHROCYTE [DISTWIDTH] IN BLOOD BY AUTOMATED COUNT: 12.6 % (ref 11.9–14.6)
GLUCOSE SERPL-MCNC: 128 MG/DL (ref 70–99)
HCT VFR BLD AUTO: 28.3 % (ref 35.8–46.3)
HGB BLD-MCNC: 9.4 G/DL (ref 11.7–15.4)
IMM GRANULOCYTES # BLD AUTO: 0.1 K/UL (ref 0–0.5)
IMM GRANULOCYTES NFR BLD AUTO: 1 % (ref 0–5)
LYMPHOCYTES # BLD: 1 K/UL (ref 0.5–4.6)
LYMPHOCYTES NFR BLD: 13 % (ref 13–44)
MAGNESIUM SERPL-MCNC: 1.7 MG/DL (ref 1.8–2.4)
MCH RBC QN AUTO: 32.4 PG (ref 26.1–32.9)
MCHC RBC AUTO-ENTMCNC: 33.2 G/DL (ref 31.4–35)
MCV RBC AUTO: 97.6 FL (ref 82–102)
MONOCYTES # BLD: 0.6 K/UL (ref 0.1–1.3)
MONOCYTES NFR BLD: 8 % (ref 4–12)
NEUTS SEG # BLD: 5.6 K/UL (ref 1.7–8.2)
NEUTS SEG NFR BLD: 75 % (ref 43–78)
NRBC # BLD: 0 K/UL (ref 0–0.2)
PLATELET # BLD AUTO: 256 K/UL (ref 150–450)
PMV BLD AUTO: 9.8 FL (ref 9.4–12.3)
POTASSIUM SERPL-SCNC: 3.5 MMOL/L (ref 3.5–5.1)
RBC # BLD AUTO: 2.9 M/UL (ref 4.05–5.2)
SODIUM SERPL-SCNC: 137 MMOL/L (ref 136–145)
WBC # BLD AUTO: 7.5 K/UL (ref 4.3–11.1)

## 2024-10-14 PROCEDURE — 6370000000 HC RX 637 (ALT 250 FOR IP): Performed by: PHYSICIAN ASSISTANT

## 2024-10-14 PROCEDURE — 36415 COLL VENOUS BLD VENIPUNCTURE: CPT

## 2024-10-14 PROCEDURE — 97530 THERAPEUTIC ACTIVITIES: CPT

## 2024-10-14 PROCEDURE — 97535 SELF CARE MNGMENT TRAINING: CPT

## 2024-10-14 PROCEDURE — 1180000000 HC REHAB R&B

## 2024-10-14 PROCEDURE — 99232 SBSQ HOSP IP/OBS MODERATE 35: CPT | Performed by: PHYSICAL MEDICINE & REHABILITATION

## 2024-10-14 PROCEDURE — 6360000002 HC RX W HCPCS: Performed by: PHYSICAL MEDICINE & REHABILITATION

## 2024-10-14 PROCEDURE — 97110 THERAPEUTIC EXERCISES: CPT

## 2024-10-14 PROCEDURE — 85025 COMPLETE CBC W/AUTO DIFF WBC: CPT

## 2024-10-14 PROCEDURE — 6370000000 HC RX 637 (ALT 250 FOR IP): Performed by: PHYSICAL MEDICINE & REHABILITATION

## 2024-10-14 PROCEDURE — 97116 GAIT TRAINING THERAPY: CPT

## 2024-10-14 PROCEDURE — 83735 ASSAY OF MAGNESIUM: CPT

## 2024-10-14 PROCEDURE — 80048 BASIC METABOLIC PNL TOTAL CA: CPT

## 2024-10-14 RX ORDER — LANOLIN ALCOHOL/MO/W.PET/CERES
400 CREAM (GRAM) TOPICAL DAILY
Status: COMPLETED | OUTPATIENT
Start: 2024-10-14 | End: 2024-10-16

## 2024-10-14 RX ORDER — LANOLIN ALCOHOL/MO/W.PET/CERES
9 CREAM (GRAM) TOPICAL NIGHTLY
Status: DISCONTINUED | OUTPATIENT
Start: 2024-10-14 | End: 2024-10-18 | Stop reason: HOSPADM

## 2024-10-14 RX ORDER — HYDRALAZINE HYDROCHLORIDE 25 MG/1
50 TABLET, FILM COATED ORAL EVERY 8 HOURS SCHEDULED
Status: DISCONTINUED | OUTPATIENT
Start: 2024-10-14 | End: 2024-10-18

## 2024-10-14 RX ADMIN — PANTOPRAZOLE SODIUM 40 MG: 40 TABLET, DELAYED RELEASE ORAL at 06:07

## 2024-10-14 RX ADMIN — HYDROCHLOROTHIAZIDE 25 MG: 25 TABLET ORAL at 08:52

## 2024-10-14 RX ADMIN — Medication 3 MG: at 00:58

## 2024-10-14 RX ADMIN — Medication 9 MG: at 20:15

## 2024-10-14 RX ADMIN — FERROUS SULFATE TAB 325 MG (65 MG ELEMENTAL FE) 325 MG: 325 (65 FE) TAB at 12:48

## 2024-10-14 RX ADMIN — ATORVASTATIN CALCIUM 40 MG: 40 TABLET, FILM COATED ORAL at 20:15

## 2024-10-14 RX ADMIN — LEVOTHYROXINE SODIUM 50 MCG: 0.05 TABLET ORAL at 06:07

## 2024-10-14 RX ADMIN — HYDRALAZINE HYDROCHLORIDE 50 MG: 25 TABLET ORAL at 20:15

## 2024-10-14 RX ADMIN — SERTRALINE 100 MG: 100 TABLET, FILM COATED ORAL at 08:52

## 2024-10-14 RX ADMIN — HYDRALAZINE HYDROCHLORIDE 25 MG: 25 TABLET ORAL at 06:07

## 2024-10-14 RX ADMIN — MAGNESIUM GLUCONATE 500 MG ORAL TABLET 400 MG: 500 TABLET ORAL at 10:30

## 2024-10-14 RX ADMIN — AMLODIPINE BESYLATE 10 MG: 10 TABLET ORAL at 08:51

## 2024-10-14 RX ADMIN — LOSARTAN POTASSIUM 100 MG: 50 TABLET, FILM COATED ORAL at 08:51

## 2024-10-14 RX ADMIN — ENOXAPARIN SODIUM 40 MG: 100 INJECTION SUBCUTANEOUS at 10:30

## 2024-10-14 ASSESSMENT — PAIN SCALES - GENERAL
PAINLEVEL_OUTOF10: 0
PAINLEVEL_OUTOF10: 0

## 2024-10-14 NOTE — PROGRESS NOTES
[facility] OCCUPATIONAL THERAPY DAILY NOTE  OT Individual Minutes  Time In: 1300  Time Out: 1345  Minutes: 45               Subjective: Pt agreeable to OT treatment.  Pain: Patient reports 2/10 pain and RN notified .  Interdisciplinary Communication: Collaborated with RN regarding pt status, pt performance, and handoff communication.   Precautions: Falls, Impulsive, Poor Safety Awareness, and [facility] Alarm    FUNCTIONAL MOBILITY:       Bed Mobility NT    Sit to Stand SBA and CGA    Transfer  SBA and CGA  Transfer Type: SPT  Equipment: RW Multiple transfers: toilet, wc, recliner   Ambulation SBA and CGA  Equipment: RW       - Activity Tolerance - Balance - Coordination - Self-Care   ACTIVITIES OF DAILY LIVING:       Toileting Hygiene Supervision or touching assistance SUP/SBA   Toilet Transfer Supervision or touching assistance SBA-CGA w/ FWW, grab bar          - Activity Tolerance - Balance - Cognition - Coordination - Energy Conservation/Pacing  - Strengthening - Visual-Perceptual    Pt completed 4 minutes on the upper body ergometer, ½ forward and ½ backward, with light resistance to increase upper body strength and activity tolerance for integration into functional tasks. Pt requires multiple rest breaks, ceased task d/t pt c/o shoulder/elbow discomfort.      Pt completed the following exercises with 3 lb santos to promote UB strength, activity tolerance, and shoulder stabilization for integration into functional tasks:  Exercise Repetitions Comments   [x] Protraction/Retraction  x30    [x]  Abduction/Adduction  x30    [x]          Circles  x15 Clockwise   []              Vs      []          Alphabet               Attempted standing activity at standing frame, upon standing pt c/o dizziness.  Vitals assessed- RN informed of vitals and pt performance:  Vitals after sitting 1 min: BP 96/56, map 70; HR 92; 91% O2.  Vitals after sitting 7 mins: /115, map 123; 95% O2.    Education   Benefits of OT, Energy Conservation,  Patient c/o frequency and burning when urinating. Patient also c/o lower back pain and temperature reading of 99.        Reason for Disposition   [1] Discomfort (pain, burning or stinging) when passing urine AND [2] pregnant   Side (flank) or lower back pain present    Additional Information   Negative: Shock suspected (e.g., cold/pale/clammy skin, too weak to stand, low BP, rapid pulse)   Negative: Sounds like a life-threatening emergency to the triager   Negative: Followed a genital area injury   Negative: Followed a genital area injury (penis, scrotum)   Negative: Vaginal discharge   Negative: Pus (white, yellow) or bloody discharge from end of penis   Negative: [1] Taking antibiotic for urinary tract infection (UTI) AND [2] female   Negative: [1] Taking antibiotic for urinary tract infection (UTI) AND [2] male   Negative: Shock suspected (e.g., cold/pale/clammy skin, too weak to stand, low BP, rapid pulse)   Negative: Sounds like a life-threatening emergency to the triager   Negative: Followed a female genital area injury (e.g., vagina, vulva)   Negative: Followed a male genital area injury (e.g., penis, scrotum)   Negative: Vaginal discharge   Negative: Pus (white, yellow) or bloody discharge from end of penis   Negative: [1] Taking antibiotic for urinary tract infection (UTI) AND [2] female   Negative: [1] Taking antibiotic for urinary tract infection (UTI) AND [2] male   Negative: [1] Discomfort (pain, burning or stinging) when passing urine AND [2] pregnant   Negative: [1] Discomfort (pain, burning or stinging) when passing urine AND [2] postpartum (from 0 to 6 weeks after delivery)   Negative: [1] Discomfort (pain, burning or stinging) when passing urine AND [2] female   Negative: [1] Discomfort (pain, burning or stinging) when passing urine AND [2] male   Negative: Pain or itching in the vulvar area   Negative: Pain in scrotum is main symptom   Negative: Blood in the urine is main  symptom   Negative: Symptoms arising from use of a urinary catheter (Bon[place] or Coude)   Negative: [1] Unable to urinate (or only a few drops) > 4 hours AND [2] bladder feels very full (e.g., palpable bladder or strong urge to urinate)   Negative: [1] Decreased urination and [2] drinking very little AND [2] dehydration suspected (e.g., dark urine, no urine > 12 hours, very dry mouth, very lightheaded)   Negative: Patient sounds very sick or weak to the triager   Negative: Fever > 100.4 F (38.0 C)    Protocols used: ST URINARY SYMPTOMS-A-AH, URINARY SYMPTOMS-A-AH

## 2024-10-14 NOTE — PROGRESS NOTES
[place] [address]  Tel: [phone]     Physical Medicine and Rehabilitation Progress Note      Estefanía Hope  Admit Date: 10/11/2024  Admit Diagnosis: Physical debility [R53.81]    Subjective     No significant events over weekend.     Patient seen this morning during break in therapy; she good-naturedly reports she's \"worn out.\"  Patient reports abdominal pain is much less now. She reports not sleeping as well last night, noting she didn't know she had to request melatonin and stating she takes 9mg at home. +BM this AM, voiding without issue. She is considering staying with her sister locally for the short-term after discharge. All questions and concerns were addressed at this time.    Objective:     Current Facility-Administered Medications   Medication Dose Route Frequency    melatonin tablet 9 mg  9 mg Oral Nightly    magnesium oxide (MAG-OX) tablet 400 mg  400 mg Oral Daily    hydrALAZINE (APRESOLINE) tablet 50 mg  50 mg Oral 3 times per day    acetaminophen (TYLENOL) tablet 650 mg  650 mg Oral Q6H PRN    amLODIPine (NORVASC) tablet 10 mg  10 mg Oral Daily    atorvastatin (LIPITOR) tablet 40 mg  40 mg Oral Nightly    enoxaparin (LOVENOX) injection 40 mg  40 mg SubCUTAneous Daily    ferrous sulfate (IRON 325) tablet 325 mg  325 mg Oral Lunch    levothyroxine (SYNTHROID) tablet 50 mcg  50 mcg Oral QAM AC    losartan (COZAAR) tablet 100 mg  100 mg Oral Daily    And    hydroCHLOROthiazide (HYDRODIURIL) tablet 25 mg  25 mg Oral Daily    oxyCODONE (ROXICODONE) immediate release tablet 5 mg  5 mg Oral Q6H PRN    ondansetron (ZOFRAN-ODT) disintegrating tablet 4 mg  4 mg Oral Q8H PRN    pantoprazole (PROTONIX) tablet 40 mg  40 mg Oral QAM AC    polyethylene glycol (GLYCOLAX) packet 17 g  17 g Oral Daily PRN    sertraline (ZOLOFT) tablet 100 mg  100 mg Oral Daily    bisacodyl (DULCOLAX) suppository 10 mg  10 mg Rectal Daily PRN    senna (SENOKOT)  3.5 - 5.1 mmol/L    Chloride 100 98 - 107 mmol/L    CO2 28 20 - 28 mmol/L    Anion Gap 10 9 - 18 mmol/L    Glucose 128 (H) 70 - 99 mg/dL    BUN 16 8 - 23 MG/DL    Creatinine 0.77 0.60 - 1.10 MG/DL    Est, Glom Filt Rate 81 >60 ml/min/1.73m2    Calcium 9.6 8.8 - 10.2 MG/DL   CBC with Auto Differential    Collection Time: 10/14/24  5:19 AM   Result Value Ref Range    WBC 7.5 4.3 - 11.1 K/uL    RBC 2.90 (L) 4.05 - 5.2 M/uL    Hemoglobin 9.4 (L) 11.7 - 15.4 g/dL    Hematocrit 28.3 (L) 35.8 - 46.3 %    MCV 97.6 82 - 102 FL    MCH 32.4 26.1 - 32.9 PG    MCHC 33.2 31.4 - 35.0 g/dL    RDW 12.6 11.9 - 14.6 %    Platelets 256 150 - 450 K/uL    MPV 9.8 9.4 - 12.3 FL    nRBC 0.00 0.0 - 0.2 K/uL    Differential Type AUTOMATED      Neutrophils % 75 43 - 78 %    Lymphocytes % 13 13 - 44 %    Monocytes % 8 4.0 - 12.0 %    Eosinophils % 2 0.5 - 7.8 %    Basophils % 1 0.0 - 2.0 %    Immature Granulocytes % 1 0.0 - 5.0 %    Neutrophils Absolute 5.6 1.7 - 8.2 K/UL    Lymphocytes Absolute 1.0 0.5 - 4.6 K/UL    Monocytes Absolute 0.6 0.1 - 1.3 K/UL    Eosinophils Absolute 0.2 0.0 - 0.8 K/UL    Basophils Absolute 0.0 0.0 - 0.2 K/UL    Immature Granulocytes Absolute 0.1 0.0 - 0.5 K/UL   Magnesium    Collection Time: 10/14/24  5:19 AM   Result Value Ref Range    Magnesium 1.7 (L) 1.8 - 2.4 mg/dL         Bowel / Bladder Program:  Last BM (including prior to admit): 10/14/24    Urine Assessment  Urinary Status: Voiding  Urinary Incontinence: Present  Urine Color: Yellow/straw  Urine Appearance: Clear  Urine Odor: No odor         Functional Assessment:  Per PT:   Bed mobility  Bridging: Minimal assistance  Rolling to Left: Supervision  Rolling to Right: Supervision  Supine to Sit: Minimal assistance  Sit to Supine: Minimal assistance  Bed Mobility Comments: Increased time and effort to complete with cues for body mechanics   Transfers  Sit to Stand: Minimal Assistance  Stand to Sit: Contact guard assistance  Stand Pivot Transfers: Contact

## 2024-10-14 NOTE — PROGRESS NOTES
seated rest break after going up steps. Single step at a time leading up with RLE and down with LLE. NT         BALANCE Daily Assessment    Static Sitting: Normal:  Pt. able to maintain balance w/o UE support  Dynamic Sitting: Good - accepts moderate challenge;  can maintain balance while picking object off the floor  Static Standing: Good:  Pt. able to maintain balance w/o UE support;  exhibits some postural sway  Dynamic Standing: Fair - accepts minimal challenge;  can maintain balance while turning head/trunk       WHEELCHAIR MOBILITY Daily Assessment    Wheelchair Size: 18x16  Wheelchair Type: Standard  Wheelchair Cushion: Pressure Relieving (ROHO)  Pressure Relief Type:  (SIT<>sTAND WITH RW)  Level of Assistance for pressure relief activities: Minimal assistance  Wheelchair Parts Management: No  Propulsion: No                     LOWER EXTREMITY EXERCISES   SEATED EXERCISES Sets Reps Comments   Ankle PF 3 10 Verbal cues   Ankle DF 3 10 Verbal cues   Long Arc Quads 3 10 Min assist   Hip Abduction with red Theraband 3 10 Verbal cues   Hamstring Curls with red Theraband 3 10 Min assist   Hip Extension with red Theraband 3 10 Min assist          Pain level: No c/o pain during treatment  Pain Location:  NA  Pain Interventions: NA    Vital Signs:  BP (!) 149/65   Pulse 92   Temp 98.2 °F (36.8 °C) (Oral)   Resp 18   Ht 1.67 m (5' 5.75\")   Wt 91.2 kg (201 lb 1.6 oz)   SpO2 95%   BMI 32.71 kg/m²       Education:    Education Given To: Patient  Education Provided: Safety;Transfer Training;Energy Conservation;Fall Prevention Strategies;Precautions;Mobility Training  Education Method: Demonstration;Verbal  Barriers to Learning: None  Education Outcome: Verbalized understanding;Continued education needed;Demonstrated understanding     Interdisciplinary Communication: spoke with OT regarding functional status    Pt. Left in bed call bell in reach needs in reach bed/chair alarm activated         Assessment: Progressing  towards goals. Gait distance limited due to fatigue.          Plan of Care: Continue with POC and progress as tolerated.     Adrian Vargas, PT  10/14/2024

## 2024-10-14 NOTE — PROGRESS NOTES
[facility] OCCUPATIONAL THERAPY DAILY NOTE  OT Individual Minutes  Time In: 0701  Time Out: 0730  Minutes: 29               Subjective: Pt agreeable to treatment. \"I'm sleepy.\"  Pain: No pain expressed.  Interdisciplinary Communication: Collaborated with RN regarding pt status and pt performance.   Precautions: Falls, Poor Safety Awareness, and [place] Alarm    FUNCTIONAL MOBILITY:       Bed Mobility SBA    Sit to Stand SBA and CGA    Transfer  CGA  Transfer Type: SPT  Equipment: RW    Ambulation CGA  Equipment: RW      ACTIVITIES OF DAILY LIVING:       Eating       Oral Hygiene       Shower/Bathe Supervision or touching assistance SBA/CGA UB/LB bathing seated and standing EOB with RW   Upper Body  Dressing Supervision or touching assistance SBA don/doff gown and shirt seated EOB   Lower Body Dressing Partial/moderate assistance José don/doff pants and brief. José to thread RLE through pants   Donning/[place] Footwear Partial/moderate assistance SBA/José able to don socks and shoes, José for tying shoes. Will review adaptive equipment and techniques for shoe tying next session as appropriate.    Toileting Hygiene       Toilet Transfer       Education Adaptive ADL Techniques, Benefits of OT, Energy Conservation, Pacing, Functional Transfer Training, Rolling Walker Management, and Safety Awareness     Assessment: Patient tolerated session well. Pt demonstrated good participation in OT treatment. Pt continues to benefit from skilled OT services to address remaining deficits and progress toward baseline level of independence and safety. Patient ended session seated in recliner with call bell and needs within reach, with chair/bed alarm activated, and with breakfast tray .   Plan: Continue OT POC.     JAYLYN MCFADDEN   10/14/2024

## 2024-10-14 NOTE — PROGRESS NOTES
7-day week. Pt completed 6/6 medications. No errors noted. Pt required 1-3 verbal cues for problem solving. 4 drops noted throughout.   Pt completed peg and board activity seated at table. Pt used LUE to retrieve pegs from container on L side. Pt then used BUE to separate stacked [2] pegs. Once  pt used RUE to place pegs into green board placed midline of pt as instructed via verbal and/or diagram instructions. Patient completed 2 sets, mod difficulty, at tabletop in appropriate time with decreased tremors. Activity graded up to having peg board placed on elevated slant board (65 degrees). Pt completed 1 set of max difficulty. Pt completed with increased time and no drops noted. Pt required 1-3 verbal cues for problem solving.  Pt completed object retrieval activity seated at table. Pt used B/L hand(s) to spread, stretch and flatten putty. Pt located and retrieved beads with B/L hand(s) and placed into container on L. Pt completed Orange, mixed soft-medium, theraputty with 10 beads. Pt required increased time, 1 drop noted and 1-3 verbal cues for problem solving.      Education   Adaptive ADL Techniques, Benefits of OT, Energy Conservation, Pacing, Functional Transfer Training, Rolling Walker Management, and Safety Awareness     Assessment: Patient tolerated session well overall. Increased seated rest break duration and frequency secondary to pt report of fatigue. Patient reports increased tremors since hospitalization. Therapist observed and assessed tremors with peg board activity. Noted decrease in tremors overtime with activity. Will continue to monitor. RN notified. Pt demonstrated good participation in OT treatment. Pt continues to benefit from skilled OT services to address remaining deficits and progress toward baseline level of independence and safety. Patient ended session seated in w/c with PT.   Plan: Continue OT POC.     JAYLYN MCFADDEN   10/14/2024

## 2024-10-14 NOTE — PROGRESS NOTES
PHYSICAL THERAPY DAILY NOTE    PT Individual Minutes  Time In: 0933  Time Out: 1019  Minutes: 46                 Total Treatment Time:  46 Minutes    Pt. Seen for: AM, Gait Training, Patient Education, Therapeutic Exercise, Transfer Training, and Other     Subjective: Patient reporting she is tired from AM ADL. Requesting to rest in the bed after AM PT         Objective:  Restrictions/Precautions: Fall Risk, Bed Alarm, Other (comment) (posey alarm)  Required Braces or Orthoses?: No              Other position/activity restrictions: abdominal incision, log rolling and supine<>sidelying<>sit for pain control         GROSS ASSESSMENT   Patient resting in w/c at beginning of treatment        COGNITION Daily Assessment    Overall Orientation Status: Within Normal Limits   Overall Cognitive Status: WNL        BED/MAT MOBILITY Daily Assessment     Bridging: Minimal assistance  Rolling to Left: Supervision  Rolling to Right: Supervision  Supine to Sit: Minimal assistance  Sit to Supine: Minimal assistance  Bed Mobility Comments: Increased time and effort to complete with cues for body mechanics        TRANSFERS Daily Assessment    Sit to Stand: Minimal Assistance  Stand to Sit: Contact guard assistance  Stand Pivot Transfers: Contact guard assistance (with RW)  Comment: Increased time and effort to complete with cues for body mechanics          GAIT Daily Assessment    Surface: Level tile  Device: Rolling Walker  Assistance: Contact guard assistance  Quality of Gait: slow cont step through gait pattern with flexed posture  Gait Deviations: Decreased step height;Increased YSABEL;Decreased step length;Decreased arm swing (increased hip ext rot with foot rotated outwards R>L)  Distance: 50 ft x 2 with 4 min seated rest break after 50 ft  More Ambulation?: No              STEPS/STAIRS Daily Assessment          NT         BALANCE Daily Assessment    Static Sitting: Normal:  Pt. able to maintain balance w/o UE support  Dynamic  Sitting: Good - accepts moderate challenge;  can maintain balance while picking object off the floor  Static Standing: Good:  Pt. able to maintain balance w/o UE support;  exhibits some postural sway  Dynamic Standing: Fair - accepts minimal challenge;  can maintain balance while turning head/trunk       WHEELCHAIR MOBILITY Daily Assessment    Wheelchair Size: 18x16 (assessed patient for 18x16 vs 20x16. 18x16 with improved seat to floor height, full length arm rests and ROHO cushion for improved body mechanics  with sit<>stand and improved sitting posture)  Wheelchair Type: Standard  Wheelchair Cushion: Pressure Relieving (ROHO)  Pressure Relief Type:  (SIT<>sTAND WITH RW)  Level of Assistance for pressure relief activities: Minimal assistance  Wheelchair Parts Management: No  Propulsion: No                     LOWER EXTREMITY EXERCISES   LE motomed x 10 mins at level 2     Pain level: No c/o pain during treatment  Pain Location:  NA  Pain Interventions: NA    Vital Signs:  BP (!) 149/65   Pulse 92   Temp 98.2 °F (36.8 °C) (Oral)   Resp 18   Ht 1.67 m (5' 5.75\")   Wt 91.2 kg (201 lb 1.6 oz)   SpO2 95%   BMI 32.71 kg/m²       Education:    Education Given To: Patient  Education Provided: Safety;Transfer Training;Energy Conservation;Fall Prevention Strategies;Precautions;Mobility Training  Education Method: Demonstration;Verbal  Barriers to Learning: None  Education Outcome: Verbalized understanding;Continued education needed;Demonstrated understanding     Interdisciplinary Communication: spoke with OT regarding functional status    Pt. Left in bed call bell in reach needs in reach bed/chair alarm activated         Assessment: Progressing towards goals. Gait distance limited due to fatigue. Improved body mechanics with less assistance with change in w/c as noted above.          Plan of Care: Continue with POC and progress as tolerated.     Adrian Vargas, PT  10/14/2024

## 2024-10-14 NOTE — PRE-CERTIFICATION NOTE
Amelia Wright, RN  Registered Nurse  Physical Medicine and Rehabilitation     Pre-certification Note      Signed     Date of Service: 10/11/2024 10:58 AM   Related encounter: ED to Hosp-Admission (Discharged) from 10/3/2024 in [facility] SURGICAL     Signed       Expand All Collapse All    [facility]  Pre-admission Assessment     Facility Information: [facility]  Patient Name: Estefanía Hope        MRN: [id number]    : 1950 (73 y.o.)  Gender: female   Ethnicity:Not of , /a, or [place] origin  Race:White     COVERAGE INFORMATION  Active Insurance as of 10/3/2024         Primary Coverage         Payor Plan Insurance Group Employer/Plan Group     MEDICARE MEDICARE PART A AND B            Payor Address Payor Phone Number Payor Fax Number Effective Dates     [address] [address] [phone]   2013 - None Entered     [facility] [id number]              Subscriber Name Subscriber Birth Date Member ID          ESTEFANÍA HOPE 1950 [id number]                    Secondary Coverage         Payor Plan Insurance Group Employer/Plan Group     BCBS ANTHEM BCBS [facility]            Payor Plan Address Payor Plan Phone Number Payor Plan Fax Number Effective Dates     [address]     1995 - None Entered     [facility] [id number]              Subscriber Name Subscriber Birth Date Member ID          ESTEFANÍA HOPE 1950 [id number]                         Update Due:      PHYSICIAN/REFERRAL INFORMATION  Attending Physician: Crow Montero MD   Admitted From: [facility]  Date of Admission to the Hospital: 10/3/2024 10:59 PM  Date Patient Eligible for Admission: 10/11/2024     PRIOR LIVING SITUATION/LEVEL OF FUNCTION:  Social/Functional History  Lives With: Family (lives in [place], but was previously living with sister and here visiting.)  Home Equipment: Cane  Receives Help From: Family  ADL Assistance: Independent  Homemaking Assistance:  training as applicable, cognitive and communication management as applicable.   Physiatrist:  Dr Nnamdi Sargent     PRECAUTIONS:   Restrictions/Precautions: Fall Risk      Safety/Fall  Isolation Precautions: None                  CURRENT FUNCTIONAL STATUS:  Upper Extremity ADL’s:  NT  Lower Extremity ADL’s: NT  Bed, Chair, Wheelchair Transfers: 03, Partial/Moderate Assistance  Toilet Transfers:  NT  Grooming:   Mod Assist  Locomotion: 03, Partial/Moderate Assistance  Bladder Continence: 9, N/A (e.g., indwelling catheter)  Bowel Continence: 0, Always continent     REHABILITATION GOALS AND PLAN:  Expected Level of Improvement For Safe Discharge: Supervision with ADLs and Ambulation  Required Therapy:   Physical Therapy: > 90 minutes per day, > 5 days per week for duration of rehab stay  Occupational Therapy: > 90 minutes per day, > 5 days per week for duration of rehab stay  Speech Therapy: Per doctor's order   Anticipated Duration of Inpatient Rehab Stay: 2 weeks     Expected Discharge Destination: Home with Assistance  Expected Services Upon Discharge: Home Health: PT and OT     Acute Inpatient Rehabilitation Disclosure Statement provided to patient. Patient verbalized understanding. yes     I have reviewed and concur with the findings and results of the pre-admission screening assessment completed by the Inpatient Rehabilitation Admissions Coordinator.               Cosigned by: Nnamdi Sargent MD at 10/11/2024 11:45 AM     Revision History

## 2024-10-14 NOTE — PLAN OF CARE
Problem: Safety - Adult  Goal: Free from fall injury  10/13/2024 2142 by Xena Avilez, RN  Outcome: Progressing  10/13/2024 1451 by Ting Gupta, RN  Outcome: Progressing

## 2024-10-14 NOTE — CARE COORDINATION
Case Management Assessment  Initial Evaluation    Date/Time of Evaluation: 10/14/2024 12:39 PM  Assessment Completed by: Niki Sanchez LCSW    If patient is discharged prior to next notation, then this note serves as note for discharge by case management.    Patient Name: Estefanía Hope                   YOB: 1950  Diagnosis: Physical debility [R53.81]                   Date / Time: 10/11/2024  3:44 PM    Patient Admission Status: REHAB IP   Readmission Risk (Low < 19, Mod (19-27), High > 27): Readmission Risk Score: 15.4    Current PCP: Javier Avilez, APRN - NP  PCP verified by CM? (P) Yes    Chart Reviewed: Yes      History Provided by: (P) Patient  Patient Orientation: (P) Alert and Oriented    Patient Cognition: (P) Alert    Hospitalization in the last 30 days (Readmission):  No    If yes, Readmission Assessment in CM Navigator will be completed.    Advance Directives:      Code Status: Full Code   Patient's Primary Decision Maker is: (P) Legal Next of Kin    Primary Decision Maker: Garima Burch - Brother/Sister - [phone]    Discharge Planning:    Patient lives with: (P) Children Type of Home: (P) House  Primary Care Giver: (P) Self  Patient Support Systems include: (P) Children   Current Financial resources: (P) Medicare (Secondary BCBS;)  Current community resources: (P) Housing  Current services prior to admission: (P) None            Current DME: (P) Cane            Type of Home Care services:  (P) None    ADLS  Prior functional level: (P) Independent in ADLs/IADLs  Current functional level: (P) Independent in ADLs/IADLs    PT AM-PAC:   /24  OT AM-PAC:   /24    Family can provide assistance at DC: (P) Yes  Would you like Case Management to discuss the discharge plan with any other family members/significant others, and if so, who? (P) Yes  Plans to Return to Present Housing: (P) Yes  Other Identified Issues/Barriers to RETURNING to current housing: return to current housing

## 2024-10-14 NOTE — PLAN OF CARE
[facility]  Rehab Physician Plan of Care Review  Individualized Overall Plan of Care     Patient Name:  Estefanía Hope          Expected LOS: 10 days  Rehabilitation IGC: Physical debility [R53.81] in setting of small bowel obstruction, multiple surgical interventions    Primary Rehabilitation (etiologic) diagnosis: Physical debility [R53.81] in setting of small bowel obstruction, multiple surgical interventions     Medical/Functional Prognosis: Good     Anticipated functional outcomes/goals and interventions: Performs mobility and self care activities at the highest level of function for planned discharge setting. Advance to the least restrictive diet without signs or symptoms of aspiration and demonstrate communication skills at the highest level of function for planned discharge setting.     Patient's/family's anticipated outcomes/personal goals: to improve strength, endurance, and independence      Physical therapy functional outcome/goal:  Goals:  Discharge Goals:  Pt will demonstrate roll left & right & transition supine<>sit with Independent in 10 days   2.   Pt. will transfer from bed to/from chair with Independent using the least restrictive device in 10 days   3.   Pt. will ambulate 250 feet with RW or LRAD and Independent in 10 days    4.   Pt. Will ambulate up/down 12 steps with rail and CGA in 10 days      Pt will benefit from skilled physical therapy in order to improve independent functional mobility within the home. Interventions may include range of motion (AROM, PROM B LE/trunk), motor function (B LE/trunk strengthening/coordination), activity tolerance (vitals, oxygen saturation levels), bed mobility training, balance activities, gait training (progressive ambulation program), and functional transfer training.        Occupational therapy functional outcome/goal:   GOALS:  Long Term Goals:  Time Frame for Long Term Goals: 10 days   LTG 1: Patient will dress UB with Setup Assistance using  appropriate bladder management as indicated to avoid urinary tract complications.      Required Therapy (Check all that apply)  PT/OT at least 15 hours/per week     Any updates/changes to the Required Therapies section in Rehab POC Review document, as compared to the Pre-Admission Screening document, are due to the assessment completed by the interdisciplinary team members. These changes/updates are reviewed and approved by the Physical Medicine & Rehabilitation physician.        Anticipated Discharge Plan  Community Setting:Home Care and family assist  Will recommend Home Health care, although will be unlikely to arrange as [place]     Anticipated length of stay: 10 days after admission     Physician:  Nnamdi Sargent MD  Date: 10/14/2024  Time:  8:21 AM

## 2024-10-15 PROCEDURE — 6370000000 HC RX 637 (ALT 250 FOR IP): Performed by: PHYSICIAN ASSISTANT

## 2024-10-15 PROCEDURE — 6370000000 HC RX 637 (ALT 250 FOR IP): Performed by: PHYSICAL MEDICINE & REHABILITATION

## 2024-10-15 PROCEDURE — 6360000002 HC RX W HCPCS: Performed by: PHYSICAL MEDICINE & REHABILITATION

## 2024-10-15 PROCEDURE — 99233 SBSQ HOSP IP/OBS HIGH 50: CPT | Performed by: PHYSICAL MEDICINE & REHABILITATION

## 2024-10-15 PROCEDURE — 97530 THERAPEUTIC ACTIVITIES: CPT

## 2024-10-15 PROCEDURE — 97763 ORTHC/PROSTC MGMT SBSQ ENC: CPT

## 2024-10-15 PROCEDURE — 97535 SELF CARE MNGMENT TRAINING: CPT

## 2024-10-15 PROCEDURE — 97110 THERAPEUTIC EXERCISES: CPT

## 2024-10-15 PROCEDURE — 1180000000 HC REHAB R&B

## 2024-10-15 PROCEDURE — 97116 GAIT TRAINING THERAPY: CPT

## 2024-10-15 RX ADMIN — FERROUS SULFATE TAB 325 MG (65 MG ELEMENTAL FE) 325 MG: 325 (65 FE) TAB at 12:42

## 2024-10-15 RX ADMIN — AMLODIPINE BESYLATE 10 MG: 10 TABLET ORAL at 08:49

## 2024-10-15 RX ADMIN — LEVOTHYROXINE SODIUM 50 MCG: 0.05 TABLET ORAL at 06:15

## 2024-10-15 RX ADMIN — PANTOPRAZOLE SODIUM 40 MG: 40 TABLET, DELAYED RELEASE ORAL at 06:15

## 2024-10-15 RX ADMIN — HYDRALAZINE HYDROCHLORIDE 50 MG: 25 TABLET ORAL at 21:09

## 2024-10-15 RX ADMIN — HYDROCHLOROTHIAZIDE 25 MG: 25 TABLET ORAL at 08:48

## 2024-10-15 RX ADMIN — ENOXAPARIN SODIUM 40 MG: 100 INJECTION SUBCUTANEOUS at 08:50

## 2024-10-15 RX ADMIN — SERTRALINE 100 MG: 100 TABLET, FILM COATED ORAL at 08:49

## 2024-10-15 RX ADMIN — Medication 9 MG: at 21:08

## 2024-10-15 RX ADMIN — MAGNESIUM GLUCONATE 500 MG ORAL TABLET 400 MG: 500 TABLET ORAL at 08:48

## 2024-10-15 RX ADMIN — LOSARTAN POTASSIUM 100 MG: 50 TABLET, FILM COATED ORAL at 08:50

## 2024-10-15 RX ADMIN — HYDRALAZINE HYDROCHLORIDE 50 MG: 25 TABLET ORAL at 06:15

## 2024-10-15 RX ADMIN — ATORVASTATIN CALCIUM 40 MG: 40 TABLET, FILM COATED ORAL at 21:07

## 2024-10-15 RX ADMIN — HYDRALAZINE HYDROCHLORIDE 50 MG: 25 TABLET ORAL at 14:48

## 2024-10-15 NOTE — PROGRESS NOTES
Stand by assistance (with RW)  Car Transfer: Stand by assistance (SPT with RW in and out of rehab car)  Comment: Increased time and effort to complete with cues for body mechanics   Ambulation  Surface: Level tile  Device: Rolling Walker  Assistance: Stand by assistance, Supervision (SBA to supervision)  Quality of Gait: slow cont step through gait pattern with flexed posture  Gait Deviations: Decreased step height, Increased YSABEL, Decreased step length, Decreased arm swing (increased hip ext rot with foot rotated outwards R>L)  Distance: 110ft x 1  50 ft x 1, 20 ft x 1  More Ambulation?: Yes   Ambulation 2  Surface - 2: uneven (brick, slate, pebble surfaces)  Device 2: Rolling Walker  Assistance 2: Stand by assistance  Quality of Gait 2: slow cont step through gait pattern with flexed posture  Gait Deviations: Decreased step height, Decreased arm swing, Increased YSABEL, Decreased step length  Distance: 20 ft x 1       Stairs  # Steps : 4  Stairs Height: 6\"  Rails: Bilateral  Curbs: 6\"  Device: Rolling walker  Assistance: Contact guard assistance  Comment: Increased time and effort to complete with 3 min seated rest break after going up steps. Single step at a time leading up with RLE and down with LLE.  Up/down 6 inch curb step with RW and CGA with cues for managing RW   Wheelchair Activities  Wheelchair Size: 18x16  Wheelchair Type: Standard  Wheelchair Cushion: Pressure Relieving (ROHO)  Pressure Relief Type:  (SIT<>sTAND WITH RW)  Level of Assistance for pressure relief activities: Minimal assistance  Wheelchair Parts Management: No  Propulsion: No         Per OT:   Eating  Assistance Needed: Independent  Comment: MI  CARE Score: 6   Oral Hygiene  Assistance Needed: Supervision or touching assistance  Comment: SBA hair care and oral hygiene seated in recliner secondary to fatigue  CARE Score: 4  Shower/Bathe Self  Assistance Needed: Supervision or touching assistance  Comment: SBA UB/LB bathing seated and standing  Soft and Bite Sized; 4 carb choices (60 gm/meal); Low Fiber  ADULT ORAL NUTRITION SUPPLEMENT; Breakfast, Lunch, Dinner; Standard High Calorie/High Protein Oral Supplement       Active medical conditions requiring ongoing and daily monitoring:  //SBO, bowel ischemia, perforation s/p complex operative repairs 10/4 and 10/6  -incision well-appearing on DC  -monitor for acute abdominal complaints  -briefly required TPN (discontinued 10/11), now recently advanced to SBS diet.   -Dietitian following to monitor intake  -Continue PPI  -PRN bowel regimen to ensure regularity  -10/14 appears to be tolerating oral diet very well  -10/15 bowels moving without issue. Cont tolerate PO diet. Clarify staple removal timeline with surgeon prior to DC (f/u appt not until 10/29)     //Anemia, presume acute blood loss.   -Hgb stable now in 8.8-9.3 range x 3 checks. Recheck on 10/14  -Continues on ferrous sulfate  -10/14 Hgb 9.4, stable x 1wk. Should not need further routine checks.    //Hypertension, improved.  -on multiple medications  -noted to be uncontrolled (140s-150s+) at time of rehab admit; had been recently started on hydralazine by hospitalist.  -10/14: hydralazine incr to 50mg TID  -10/15 sBP in 120s, patient had OH symptoms yesterday afternoon but not this AM. Lability affecting management.     //Postoperative pain, much improved  -Has not been a significant issue.  -Continue Tylenol PRN mild pain, oxycodone IR 5mg PRN more significant pain     //Recent hypoxia - RESOLVED Spot check PRN.    //Electrolyte abnormalities  //Hypomagnesemia  -10/14 Mg 1.7; start MagOx 400mg daily x 3 doses and reassess. Recheck BMP/Mg 10/17.     //Sleep dysregulation  -Patient reports this is chronic and uses melatonin 9mg nightly at home  -Will schedule melatonin (has been PRN) and increase to home dose  -10/15 slept better on higher dose melatonin     Comorbidities: Depression, Hypothyroidism, chronic Left shoulder pain / OA / RTC injury

## 2024-10-15 NOTE — PROGRESS NOTES
patient with gait with Rollator as noted     Surface: Level tile  Device: Rollator  Assistance: Stand by assistance  Quality of Gait: slow cont step through gait pattern with flexed posture  Gait Deviations: Decreased step height;Increased YSABEL;Decreased step length;Decreased arm swing  Distance: 50ft x 2 with 5 min seated rest break on rollator seat after first attempt  More Ambulation?: No             STEPS/STAIRS Daily Assessment    NT       BALANCE Daily Assessment    Static Sitting: Normal:  Pt. able to maintain balance w/o UE support  Dynamic Sitting: Good - accepts moderate challenge;  can maintain balance while picking object off the floor  Static Standing: Good:  Pt. able to maintain balance w/o UE support;  exhibits some postural sway  Dynamic Standing: Fair - accepts minimal challenge;  can maintain balance while turning head/trunk       WHEELCHAIR MOBILITY Daily Assessment    Wheelchair Size: 18x16  Wheelchair Type: Standard  Wheelchair Cushion: Pressure Relieving (ROHO)  Pressure Relief Type:  (SIT<>sTAND WITH RW)  Level of Assistance for pressure relief activities: Minimal assistance  Wheelchair Parts Management: No  Propulsion: No                     LOWER EXTREMITY EXERCISES   NA     Pain level: No c/o pain during treatment  Pain Location:  NA  Pain Interventions: NA    Vital Signs:  BP (!) 119/59   Pulse 83   Temp 98.1 °F (36.7 °C) (Oral)   Resp 18   Ht 1.67 m (5' 5.75\")   Wt 91.2 kg (201 lb 1.6 oz)   SpO2 94%   BMI 32.71 kg/m²       Education:    Education Given To: Patient;Family (latiaer and patient's sister)  Education Provided: Safety;Transfer Training;Energy Conservation;Fall Prevention Strategies;Precautions;Mobility Training  Education Method: Demonstration;Verbal  Barriers to Learning: None  Education Outcome: Verbalized understanding;Demonstrated understanding     Interdisciplinary Communication: spoke with OT regarding family training    Pt. Left in recliner call bell in reach  needs in reach and family in room         Assessment: Patient and family member verbalizing and demonstrating understanding of patient /family education/training noted above. Patient prefers to ambulate with rollator vs RW. Patient able to safely manage rollator during gait training this PM           Plan of Care: Continue with POC and progress as tolerated.     Adrian Vargas, PT  10/15/2024

## 2024-10-15 NOTE — PROGRESS NOTES
PHYSICAL THERAPY DAILY NOTE    PT Individual Minutes  Time In: 1117  Time Out: 1202  Minutes: 45                 Total Treatment Time:  45 Minutes    Pt. Seen for: AM, Gait Training, Patient Education, Therapeutic Exercise, Transfer Training, and Other     Subjective: Patient reporting her daughter is here for family training. Reports she is going to stay at her sister's apt for a while before going back to [place]         Objective:  Restrictions/Precautions: Fall Risk, Bed Alarm, Other (comment) (posey alarm)  Required Braces or Orthoses?: No              Other position/activity restrictions: abdominal incision, log rolling and supine<>sidelying<>sit for pain control         GROSS ASSESSMENT   Patient resting in w/c at beginning of treatment        COGNITION Daily Assessment    Overall Orientation Status: Within Normal Limits   Overall Cognitive Status: WNL        BED/MAT MOBILITY Daily Assessment   Instructed patient's family member on how to assist patient with bed mobility  Made recommendations for height of bed at home    Bridging: Minimal assistance  Rolling to Left: Supervision  Rolling to Right: Supervision  Supine to Sit: Supervision  Sit to Supine: Minimal assistance (with LE)  Bed Mobility Comments: Increased time and effort to complete with cues for body mechanics        TRANSFERS Daily Assessment   Instructed patient's family member on how to assist patient with SPT with RW  .   Sit to Stand: Stand by assistance  Stand to Sit: Stand by assistance  Stand Pivot Transfers: Stand by assistance (with RW)  Car Transfer: Stand by assistance (SPT with RW in and out of rehab car)  Comment: Increased time and effort to complete with cues for body mechanics          GAIT Daily Assessment   Instructed patient's family member on how to assist patient with gait with RW as noted     Surface: Level tile  Device: Rolling Walker  Assistance: Stand by assistance;Supervision (SBA to supervision)  Quality of Gait: slow

## 2024-10-15 NOTE — PROGRESS NOTES
[facility] OCCUPATIONAL THERAPY DAILY NOTE  OT Individual Minutes  Time In: 0750  Time Out: 0846  Minutes: 56               Subjective: Pt agreeable to treatment. \"I just feel worn out.\"  Pain: No pain expressed.  Interdisciplinary Communication: Collaborated with Physician, PA, and PT regarding pt status and pt performance.   Precautions: Falls, Impulsive, Poor Safety Awareness, and [place] Alarm    FUNCTIONAL MOBILITY:       Bed Mobility SBA    Sit to Stand SBA and CGA    Transfer  SBA and CGA  Transfer Type: SPT  Equipment: RW    Ambulation SBA and CGA  Equipment: RW      ACTIVITIES OF DAILY LIVING:       Eating       Oral Hygiene Supervision or touching assistance SBA hair care and oral hygiene seated in recliner secondary to fatigue   Shower/Bathe Supervision or touching assistance SBA UB/LB bathing seated and standing at TTB with grab bars and HHSH   Upper Body  Dressing Partial/moderate assistance José don/doff shirt seated at TTB secondary to fatigue, José for pulling shirt overhead   Lower Body Dressing Partial/moderate assistance José don/doff brief and pants. José to thread LLE through pants. Will continue to educate pt on adaptive techniques and equipment as appropriate   Donning/[place] Footwear Partial/moderate assistance SBA/José able to doff sock, Min A for donning socks secondary to fatigue   Toileting Hygiene Supervision or touching assistance SBA seated and standing at commode with RW   Toilet Transfer Supervision or touching assistance SBA-CGA with RW and grab bars   Education Adaptive ADL Techniques, Benefits of OT, Energy Conservation, Pacing, Functional Transfer Training, Rolling Walker Management, and Safety Awareness     Assessment: Patient tolerated session well overall. Vitals taken. Lying supine in bed; BP: 126/58, SpO2: 96%, HR: 86. Sitting edge of bed; BP: 129/67. Standing at RW; BP: 109/57, SpO2: 94%, HR:114. Patient reported no dizziness or lightheadedness while standing at RW. Patient

## 2024-10-15 NOTE — PROGRESS NOTES
PHYSICAL THERAPY DAILY NOTE    PT Individual Minutes  Time In: 0903  Time Out: 0949  Minutes: 46                 Total Treatment Time:  46 Minutes    Pt. Seen for: AM, Gait Training, Patient Education, Therapeutic Exercise, Transfer Training, and Other     Subjective: Patient reporting she is tired from AM ADL. Reports she thinks she can walk further this AM.         Objective:  Restrictions/Precautions: Fall Risk, Bed Alarm, Other (comment) (posey alarm)  Required Braces or Orthoses?: No              Other position/activity restrictions: abdominal incision, log rolling and supine<>sidelying<>sit for pain control         GROSS ASSESSMENT   Patient resting in w/c at beginning of treatment        COGNITION Daily Assessment    Overall Orientation Status: Within Normal Limits   Overall Cognitive Status: WNL        BED/MAT MOBILITY Daily Assessment     Bridging: Minimal assistance  Rolling to Left: Supervision  Rolling to Right: Supervision  Supine to Sit: Minimal assistance  Sit to Supine: Minimal assistance  Bed Mobility Comments: Increased time and effort to complete with cues for body mechanics        TRANSFERS Daily Assessment    Sit to Stand: Stand by assistance  Stand to Sit: Stand by assistance  Stand Pivot Transfers: Stand by assistance (with RW)  Comment: Increased time and effort to complete with cues for body mechanics          GAIT Daily Assessment    Surface: Level tile  Device: Rolling Walker  Assistance: Stand by assistance  Quality of Gait: slow cont step through gait pattern with flexed posture  Gait Deviations: Decreased step height;Increased YSABEL;Decreased step length;Decreased arm swing (increased hip ext rot with foot rotated outwards R>L)  Distance: 100ft x 1  70 ft x 1  More Ambulation?: No              STEPS/STAIRS Daily Assessment    Stairs?: No     NT         BALANCE Daily Assessment    Static Sitting: Normal:  Pt. able to maintain balance w/o UE support  Dynamic Sitting: Good - accepts moderate

## 2024-10-15 NOTE — DISCHARGE INSTRUCTIONS
It is recommended that you continue a soft and bite sized diet until your outpatient follow-up with Surgery.    Please check your blood pressure twice daily at home, and keep a log to review with your primary care provider when you follow-up. Your blood pressure medications may need to be adjusted. You can purchase a blood pressure cuff at most pharmacies.    Level 6 - Soft & Bite-Sized Foods:   Soft, tender and moist, but with no thin liquid leaking/dripping from the food   Ability to ‘bite off’ a piece of food is not required   Ability to chew ‘bite-sized’ pieces so that they are safe to swallow is required   Bite-sized’ pieces no bigger than 1.5cm x 1.5cm in size ü Food can be mashed/broken down with pressure from fork   A knife is not required to cut this food    It is recommended that you avoid large amounts of high fiber foods until OK'd by surgeon. It would be okay to have small amounts of fruits, vegetables, salads, etc if you chew well.

## 2024-10-15 NOTE — PATIENT CARE CONFERENCE
Working on balance, mobility, ADLs, activity tolerance     ---------------------------------------------------------------------------------------------------    NURSING:    Continent of Bowel: yes - having frequent BM  Continent of Bladder:both continent and continent  Pain: controlled  Signs and Symptoms of Infection: no  Signs and Symptoms of Skin Breakdown: incision - looks good  Fall Risk: Yes - continue to use alarms PRN and encourage patient to call for assist   Injury and/or Falls during Inpatient Rehabilitation Admission: no  Anticoagulants: SCDs, Mobility with PT/OT  Diabetic: no  Oxygen while on IP Rehab: no      Home oxygen: no  Nutrition: Weight - Scale: 91.2 kg (201 lb 1.6 oz) / Body mass index is 32.71 kg/m².    Current diet: ADULT DIET; Dysphagia - Soft and Bite Sized; 4 carb choices (60 gm/meal); Low Fiber  ADULT ORAL NUTRITION SUPPLEMENT; Breakfast, Lunch, Dinner; Standard High Calorie/High Protein Oral Supplement    Deficiencies: incontinence, incision care    ---------------------------------------------------------------------------------------------------  Vitals:    10/14/24 1916 10/15/24 0615 10/15/24 0730 10/15/24 0849   BP: (!) 133/52 (!) 143/67 122/63 122/63   Pulse: 87 90 86    Resp: 17  18    Temp: 98.8 °F (37.1 °C)  98.5 °F (36.9 °C)    TempSrc: Oral  Oral    SpO2: 97%  94%    Weight:       Height:                  Medical Impediments: Medication/Medical management ongoing for: post surgical management; watching labs closely and adjust plan accordingly    Patient requires continued close monitoring of the following systems: CV: Monitor blood pressure and heart rate.  Adjust medications as needed; Endocrine: Monitor glycemia and adjust pharmacologic regimen as needed; Respiratory: Incentive spirometer every 2 hours while awake, as needed.  To be followed by respiratory therapist as needed; Sleep: Monitor sleep cycles and improve sleep quality as necessary to ensure adequate rest to be  able to participate in daily therapy sessions; Skin: Monitor incisions/wounds for adequate healing.  Wound care coordinator consulted as needed.  Frequent turning while in bed and repositioning in chair.  Monitor for skin breakdown or erythema; Psychiatric: Monitor for signs and symptoms of depression.  Monitor appetite.  Monitor for depression anxiety as the patient is adjusting to disability.  Monitor and adjust medications as needed; Nutrition: Continue current diet and adjust as needed and as tolerated.  Consult dietitian for nutritional assessment and recommendations; ID: Patient will be monitored closely for any signs or symptoms of infection, such as elevated white blood cell count, increased temperature, signs of UTI; Pain: The patient will be monitored closely for signs and symptoms of pain.  Pain regimen will be adjusted as needed; Cognitive function/mental status: Has the potential for residual deficits in orientation, processing, attention, thought organization, problem solving, reasoning, word retrieval, and memory given recent injury. Therefore, patient may require continued routine follow up primarily by Speech Language Pathology in addition to Occupational Therapy to address potential underlying deficits and working on higher level-cognitive function with compensatory strategies as indicated; Deep venous thromboembolism: patient is at increased risk for thromboembolic event given recent injury, new mobility limitations and current hospitalization. Therefore, maintain on mechanical DVT prophylaxis and encourage progressive out of mobility while continuing routine monitoring for potential thromboembolic events; Bowel management: increased potential for recurrent constipation given mobility limitations, current hospitalization, and medication use. Will need to continue routine monitoring of bowel function with appropriate adjustment in bowel regimen as indicated to ensure adequate bowel management;

## 2024-10-15 NOTE — PROGRESS NOTES
[facility] OCCUPATIONAL THERAPY DAILY NOTE  OT Individual Minutes  Time In: 1031  Time Out: 1113  Minutes: 42               Subjective: Pt agreeable to treatment. \"My legs are so much stronger.\"  Pain: No pain expressed.  Interdisciplinary Communication: Collaborated with PT and care team during Team Conference regarding pt status, pt performance, handoff communication, and d/c planning.   Precautions: Falls, Poor Safety Awareness, and [place] Alarm    FUNCTIONAL MOBILITY:       Bed Mobility NT    Sit to Stand SBA and CGA    Transfer  SBA and CGA  Transfer Type: SPT  Equipment: RW    Ambulation SBA and CGA  Equipment: RW       - Family Training   Pt completed self-care seated and standing at commode and TTB with RW to challenge BUE function, functional mobility and (I) with ADLs in preparation for safe return to max (I) with ADLs. Pt tolerated self-care well with no c/o pain. Rest breaks utilized as needed throughout.  Pt completed:  Functional t/f training; SBA-CGA from w/c to commode and commode to w/c with RW and grab bars; w/c to TTB and TTB to w/c with RW and grab bars to address household mobility.  Family education; Extensive education on DME, energy conservation, and transfers using TTB and RW. Educated pt on hand placement on RW and TTB. Pt demonstrated understanding. Sister expressed concern over size of bathroom that pt will be using after discharge. Therapist cued pt to keep RW close to pt to navigate small spaces. Pt demonstrated understanding. Therapist provided handout for DME (TTB) and sister confirmed grab bars in bathroom. Sister, daughter, and patient verbalized/demonstrated understanding. All OT questions answered at this time.      - Activity Tolerance - Balance - Cognition - Coordination - Energy Conservation/Pacing  - Strengthening   Pt completed therapeutic activities to challenge  BUE AROM/STR, FM/GM coordination, bi-manual coordination, dynamic balance, standing tolerance, activity tolerance,

## 2024-10-15 NOTE — CARE COORDINATION
Interdisciplinary Tuesday / Thursday, Team Conference Meeting Notes    Interdisciplinary team conference meeting completed to discuss plan of care.       Attending Staff: (Director) Ginny Turcios (Therapy Supervisor), Dr. Nnamdi Sargent, (CM) Niki, (PT) Griselda Patton Dana, Erin, (OT) Darshana Paez Joy, (SLP) Carly      Family Members Attending: Daughter, sister      Estimated D/C Date: 10/18/24      Recommended Follow-Up Therapy: Staff has recommended (HH) PT/OT/RN/Aide/SW      SNF Facility: NA      Home Health: TBD    Dialysis: NA  Name of Dialysis Facility: NA      DME: RW, tub transfer bench      Family Training: Therapy will schedule family training with family.        Communication with family/caregivers:  CM reviewed / screen patient medical chart for continued stay.   Patient needs are continue to be followed by Dr. Nnamdi Sargent. Patient was admitted on 10/11/24. Patient has been approved for estimated length of stay for 11 days.  Patient has currently used 5 out of estimated length of stay days. Patient has discharge date / plan at this time scheduled for 10/18/24. Team Conference staff met with patient / daughter and sister to discuss patient progress and barriers for discharge home. Dr. Sargent has recommended a discharge date 10/18/24 / therapy staff has recommended (HH) / DME's and family training.  Patient / spouse / family has requested a referral be made for HH to [facility] (RN, PT/OT) pt will be at following address: [address] [place] [id number]. DME (RW) referral completed. Provider for DME [facility] and has been delivered to patient room. Family training has been requested.  Therapy staff will schedule family training.  CM will continue to follow patient plan of care and assist with supportive care. CM will continue to follow / monitor for any needs, concerns or questions that may arise.          Name of Ins: Medicare A/B  Policy #: [id number]    Secondary Ins:  Policy

## 2024-10-15 NOTE — PLAN OF CARE
Problem: ABCDS Injury Assessment  Goal: Absence of physical injury  10/15/2024 1110 by Cesar Castillo RN  Outcome: Progressing  10/14/2024 2115 by Kusum Lincoln RN  Outcome: Progressing     Problem: Safety - Adult  Goal: Free from fall injury  10/15/2024 1110 by Cesar Castillo RN  Outcome: Progressing  10/14/2024 2115 by Kusum Lincoln RN  Outcome: Progressing     Problem: Pain  Goal: Verbalizes/displays adequate comfort level or baseline comfort level  10/15/2024 1110 by Cesar Castillo RN  Outcome: Progressing  10/14/2024 2115 by Kusum Lincoln RN  Outcome: Progressing  Flowsheets (Taken 10/14/2024 1916)  Verbalizes/displays adequate comfort level or baseline comfort level: Assess pain using appropriate pain scale

## 2024-10-16 PROCEDURE — 6370000000 HC RX 637 (ALT 250 FOR IP): Performed by: PHYSICIAN ASSISTANT

## 2024-10-16 PROCEDURE — 99232 SBSQ HOSP IP/OBS MODERATE 35: CPT | Performed by: PHYSICAL MEDICINE & REHABILITATION

## 2024-10-16 PROCEDURE — 6370000000 HC RX 637 (ALT 250 FOR IP): Performed by: PHYSICAL MEDICINE & REHABILITATION

## 2024-10-16 PROCEDURE — 97116 GAIT TRAINING THERAPY: CPT

## 2024-10-16 PROCEDURE — 97110 THERAPEUTIC EXERCISES: CPT

## 2024-10-16 PROCEDURE — 97530 THERAPEUTIC ACTIVITIES: CPT

## 2024-10-16 PROCEDURE — 6360000002 HC RX W HCPCS: Performed by: PHYSICAL MEDICINE & REHABILITATION

## 2024-10-16 PROCEDURE — 97535 SELF CARE MNGMENT TRAINING: CPT

## 2024-10-16 PROCEDURE — 1180000000 HC REHAB R&B

## 2024-10-16 RX ADMIN — LOSARTAN POTASSIUM 100 MG: 50 TABLET, FILM COATED ORAL at 08:24

## 2024-10-16 RX ADMIN — MAGNESIUM GLUCONATE 500 MG ORAL TABLET 400 MG: 500 TABLET ORAL at 08:24

## 2024-10-16 RX ADMIN — SERTRALINE 100 MG: 100 TABLET, FILM COATED ORAL at 08:24

## 2024-10-16 RX ADMIN — ENOXAPARIN SODIUM 40 MG: 100 INJECTION SUBCUTANEOUS at 08:24

## 2024-10-16 RX ADMIN — HYDROCHLOROTHIAZIDE 25 MG: 25 TABLET ORAL at 08:24

## 2024-10-16 RX ADMIN — ATORVASTATIN CALCIUM 40 MG: 40 TABLET, FILM COATED ORAL at 20:43

## 2024-10-16 RX ADMIN — PANTOPRAZOLE SODIUM 40 MG: 40 TABLET, DELAYED RELEASE ORAL at 05:51

## 2024-10-16 RX ADMIN — FERROUS SULFATE TAB 325 MG (65 MG ELEMENTAL FE) 325 MG: 325 (65 FE) TAB at 12:21

## 2024-10-16 RX ADMIN — Medication 9 MG: at 20:43

## 2024-10-16 RX ADMIN — HYDRALAZINE HYDROCHLORIDE 50 MG: 25 TABLET ORAL at 05:51

## 2024-10-16 RX ADMIN — LEVOTHYROXINE SODIUM 50 MCG: 0.05 TABLET ORAL at 05:52

## 2024-10-16 RX ADMIN — AMLODIPINE BESYLATE 10 MG: 10 TABLET ORAL at 08:24

## 2024-10-16 NOTE — PLAN OF CARE
Problem: Safety - Adult  Goal: Free from fall injury  10/16/2024 1942 by Xena Avilez, RN  Outcome: Progressing  10/16/2024 0920 by Noy Howard, RN  Outcome: Progressing     Problem: Pain  Goal: Verbalizes/displays adequate comfort level or baseline comfort level  10/16/2024 1942 by Xena Avilez, RN  Outcome: Progressing  10/16/2024 0920 by Noy Howard, RN  Outcome: Progressing

## 2024-10-16 NOTE — CARE COORDINATION
Discharge planning was discussed with the physical therapist. Per the therapist, the patient's daughter may be purchasing a rollator.

## 2024-10-16 NOTE — PROGRESS NOTES
Comprehensive Nutrition Assessment    Type and Reason for Visit: Reassess  General Nutrition Management/other reason (Physical Medicine)  \"s/p abdominal surgery diet advanced, monitor intermittently for meeting nutritional needs\"    Nutrition Recommendations/Plan:   Meals and Snacks:  Diet: Continue current order  Oral Nutriton Supplement Therapy:   Medical food supplement therapy:  Decrease Ensure Enlive (high calorie high protein) 350 calories, 20 grams protein per 8 ounce serving      Malnutrition Assessment:  Malnutrition Status: At risk for malnutrition (Comment) (S/p abdominal surgery x2, likely prolonged admission)  NFPE unremarkable     Nutrition Assessment:  Nutrition History: Patient known to clinical nutrition from [facility] admission. Daughter reported at that usual intake is 2 meals per day. She was eating normally until day of admission. During admission was on TPN for 5 days prior to transfer to [facility] on a soft diet with Ensure Enlive TID.     Do You Have Any Cultural, Religion, or Ethnic Food Preferences?: No   Weight History: Very limited history. Last office visit was 9/1/23 195#. Daughter reported minimal weight changes  Nutrition Background:       PMH: HTN, hypothyroid. Presented to [facility] with abdominal pain and N/V. [place] to have SBO s/p small bowel resection with wound vac 10/4. Returned to OR 10/6 for additional small bowel resection and abdominal wound closure. Transfer to [facility] 10/11.  Nutrition Interval:  Patient up to recliner. Her lunch was all consumed except a few bites of chicken. She also drank 100% of her Ensure. She report eating all or nearly all of each meal and drinking Ensure TID. Explained that now that PO has improved will decrease Ensure. Maintaining once per day in the event that she does not eat well for a meal.     Current Nutrition Therapies:  ADULT DIET; Dysphagia - Soft and Bite Sized; 4 carb choices (60 gm/meal); Low Fiber  ADULT ORAL NUTRITION SUPPLEMENT; Breakfast, Lunch,

## 2024-10-16 NOTE — PROGRESS NOTES
PHYSICAL THERAPY DAILY NOTE    PT Individual Minutes  Time In: 1312  Time Out: 1441  Minutes: 89                 Total Treatment Time:  89 Minutes    Pt. Seen for: PM, Therapeutic Exercise, and Transfer Training     Subjective: \" My blood pressure dropped earlier but I feel better now.\"         Objective:Patient sitting in recliner.  Restrictions/Precautions: General Precautions, Bed Alarm, Fall Risk  Required Braces or Orthoses?: No              Other position/activity restrictions: abdominal incision, log rolling and supine<>sidelying<>sit for pain control         GROSS ASSESSMENT           COGNITION Daily Assessment                 BED/MAT MOBILITY Daily Assessment     Supine to Sit: Unable to assess  Sit to Supine: Unable to assess        TRANSFERS Daily Assessment    Sit to Stand: Stand by assistance  Stand to Sit: Stand by assistance  Stand Pivot Transfers: Contact guard assistance          GAIT Daily Assessment   Attempted standing with rollator but /49 and patient felt 'dizzy'.                STEPS/STAIRS Daily Assessment                   BALANCE Daily Assessment    Static Sitting: Good:  Pt. able to maintain balance w/o UE support;  exhibits some postural sway  Dynamic Sitting: Good - accepts moderate challenge;  can maintain balance while picking object off the floor  Static Standing: Good:  Pt. able to maintain balance w/o UE support;  exhibits some postural sway  Dynamic Standing: NT       WHEELCHAIR MOBILITY Daily Assessment                          LOWER EXTREMITY EXERCISES   SEATED EXERCISES Sets Reps Comments   Ankle Pumps 2 15    Hip Flexion 2 15    Long Arc Quads 2 15    Hip Adduction/Ball Squeeze 2 15    Hip Abduction with green Theraband 2 15    Hamstring Curls with green Theraband 2 15    Hip Extension with green Theraband 2 15    Rode motomed x 15 minutes.       Pain level: no pain noted  Pain Location:    Pain Interventions:     Vital Signs:  BP (!) 74/46 Comment: Marylee PA notified

## 2024-10-16 NOTE — PROGRESS NOTES
complaints  -briefly required TPN (discontinued 10/11), now recently advanced to SBS diet.   -Dietitian following to monitor intake  -Continue PPI  -PRN bowel regimen to ensure regularity  -10/14 appears to be tolerating oral diet very well  -10/15 bowels moving without issue. Continues to tolerate PO diet. Staple removal with surgeon / PA at f/u appt 10/29     //Anemia, presume acute blood loss.   -Hgb stable now in 8.8-9.3 range x 3 checks. Recheck on 10/14  -Continues on ferrous sulfate  -10/14 Hgb 9.4, stable x 1wk. Should not need further routine checks.    //Hypertension, improved.  -on multiple medications  -noted to be uncontrolled (140s-150s+) at time of rehab admit; had been recently started on hydralazine by hospitalist.  -10/14 hydralazine increased to 50mg TID  -10/15 sBP in 120s, patient had OH symptoms yesterday afternoon but not this AM. Lability affecting management.  -10/16 patient with 46pt sBP drop (118/56 --> 72/46) and symptomatic with OT during mock meal prep; hold hydralazine. Resting moderate HTN with orthostasis challenging to manage.     //Postoperative pain, much improved  -Has not been a significant issue.  -Continue Tylenol PRN mild pain, oxycodone IR 5mg PRN more significant pain     //Recent hypoxia - RESOLVED Spot check PRN.    //Electrolyte abnormalities  //Hypomagnesemia  -10/14 Mg 1.7; start MagOx 400mg daily x 3 doses and reassess. Recheck BMP/Mg 10/17.     //Sleep dysregulation  -Patient reports this is chronic and uses melatonin 9mg nightly at home  -Will schedule melatonin (has been PRN) and increase to home dose  -10/15 slept better on higher dose melatonin     Comorbidities: Depression, Hypothyroidism, chronic Left shoulder pain / OA / RTC injury        CODE STATUS: Full Code     DVT prophylaxis: Mobilization with PT and OT. SCDs. Chemical prophylaxis as indicated        Acute Rehabilitation Medical Justification:  Will require continued close monitoring of the following  for potential thromboembolic events.  -Bowel management: increased potential for recurrent constipation given mobility limitations, current hospitalization, and medication use. Will need to continue routine monitoring of bowel function with appropriate adjustment in bowel regimen as indicated to ensure adequate bowel management.  Last BM (including prior to admit): 10/16/24  -Bladder management: Monitor urinary output and urinary function/dysfunction. Does have the potential for underlying bladder dysfunction given immobility. Therefore, requires continued routine monitoring with appropriate bladder management as indicated to avoid urinary tract complications.      Principal Problem:    Physical debility  Active Problems:    Osteoarthritis of right knee    Status post total knee replacement    GERD (gastroesophageal reflux disease)    Acute blood loss anemia    HTN (hypertension)    Hypothyroidism    Small bowel obstruction (HCC)    Ischemia, bowel (HCC)    Impaired functional mobility, balance, gait, and endurance    Decreased independence with activities of daily living    Encounter for rehabilitation  Resolved Problems:    * No resolved hospital problems. *         Current Risks:   **Risk for Delirium given prolonged hospital stay.   **Risks for falls given recent immobility, weakness and fatigue  **Skin breakdown and pressure injury in the setting of significantly impaired mobility and/or impaired sensation          Fall precautions in place. Chair and bed alarms are set daily and nightly to discourage patient from getting up unattended. Discussed strategy to reduce the risk of falls which include understanding patient’s known fall risk factors and management of the risk factors identified, such as modification of home environment, avoiding or decreasing psychoactive medications. Sitting for 3-5 minutes prior to standing for blood pressure acclimation and avoiding orthostasis. Encouraged continuing exercise

## 2024-10-16 NOTE — PROGRESS NOTES
[facility] OCCUPATIONAL THERAPY DAILY NOTE  OT Individual Minutes  Time In: 1032  Time Out: 1210  Minutes: 98               Subjective: Pt agreeable to treatment. \"I'm worn out.\"  Pain: No pain expressed.   Interdisciplinary Communication: Collaborated with PA, PT, and RN regarding pt status and pt performance.   Precautions: Falls, Poor Safety Awareness, and [place] Alarm    FUNCTIONAL MOBILITY:       Bed Mobility Supervision    Sit to Stand SBA    Transfer  SBA and CGA  Transfer Type: SPT  Equipment: RW    Ambulation SBA and CGA  Equipment: RW      ACTIVITIES OF DAILY LIVING:       Eating       Oral Hygiene Supervision or touching assistance SBA hair care seated in recliner; SBA oral hygiene standing at sink with RW   Shower/Bathe Supervision or touching assistance SBA UB/LB seated and standing at TTB with HHSH and grab bars   Upper Body  Dressing Supervision or touching assistance SBA don/doff shirt seated EOB   Lower Body Dressing Supervision or touching assistance SBA don/doff brief and pants seated and standing at EOB with RW, 1-3 verbal cues for body positioning. Educated pt on use of reacher, pt verbalized understanding and will use as appropriate.    Donning/[place] Footwear Supervision or touching assistance SBA don/doff socks and shoes seated in recliner   Toileting Hygiene Supervision or touching assistance SupV seated and standing at commode with RW and grab bars   Toilet Transfer Supervision or touching assistance SBA with RW and grab bars   Education Adaptive ADL Techniques, AE/DME Training, Benefits of OT, Energy Conservation, Pacing, Functional Transfer Training, Rolling Walker Management, and Safety Awareness      - Activity Tolerance - Coordination - Energy Conservation/Pacing  - Range of Motion - Strengthening   Pt completed therapeutic exercise seated to challenge BUE AROM/STR/coordination, and activity tolerance in preparation for safe return to max (I) with I/ADLs. Pt tolerated session fair secondary  to fatigue with no c/o pain. Increased seated rest break duration and frequency required secondary to fatigue.  Forward rows; 2 lb dowel bar; 2x8  Bicep curls; 2 lb dowel bar; 2x8  Seated chest press; 2 lb dowel bar; 2x8  Seated shoulder press; 0 lb dowel bar; 2x8  Activity graded to 0lb dowel bar secondary to fatigue and aching in L shoulder. Pt educated on proper form to decrease discomfort in LUE. Pt demonstrated understanding and reported decrease in discomfort.       - Activity Tolerance - Balance - Coordination - Energy Conservation/Pacing  - Strengthening   Pt completed therapeutic activities to challenge  BUE AROM/STR, FM/GM coordination, FM coordination/dexterity, bi-manual coordination, static balance, standing tolerance, activity tolerance, and problem solving in preparation for safe return to max (I) with I/ADLs. Pt tolerated activities fair secondary to fatigue and dizziness with no c/o pain. Increased seated rest break duration and frequency required secondary to fatigue.  Pt completed household management activity standing at kitchen sink with RW. Pt used BUE to retreive dish from L and then wash the dish in the sink at midline. Pt then placed dish on towel to the R to dry. Pt completed ~6-8 dishes. Pt stood for ~2 minutes before having to sit down secondary to dizziness. Vitals monitored, see assessment. RN and PA notified and aware. Patient graded to seated activity.   Pt completed object retrieval activity seated at table. Pt used B/L hand(s) to spread, stretch and flatten putty. Pt located and retrieved beads with R hand(s) and placed into container on L. Pt completed Orange, mixed soft-medium, theraputty with 10 beads. Once completed pt flattened out putty with B/L hands. Pt then retrieved beads with R hand and pressed into putty. 3 drops noted and 1-3 verbal cues for problem solving. Pt tolerated seated activity well, no further c/o dizziness or light headedness with seated activity.

## 2024-10-17 LAB
ANION GAP SERPL CALC-SCNC: 9 MMOL/L (ref 9–18)
BUN SERPL-MCNC: 17 MG/DL (ref 8–23)
CALCIUM SERPL-MCNC: 9.3 MG/DL (ref 8.8–10.2)
CHLORIDE SERPL-SCNC: 98 MMOL/L (ref 98–107)
CO2 SERPL-SCNC: 29 MMOL/L (ref 20–28)
CREAT SERPL-MCNC: 0.86 MG/DL (ref 0.6–1.1)
GLUCOSE SERPL-MCNC: 121 MG/DL (ref 70–99)
MAGNESIUM SERPL-MCNC: 1.9 MG/DL (ref 1.8–2.4)
POTASSIUM SERPL-SCNC: 3.8 MMOL/L (ref 3.5–5.1)
SODIUM SERPL-SCNC: 137 MMOL/L (ref 136–145)

## 2024-10-17 PROCEDURE — 6360000002 HC RX W HCPCS: Performed by: PHYSICAL MEDICINE & REHABILITATION

## 2024-10-17 PROCEDURE — 97530 THERAPEUTIC ACTIVITIES: CPT

## 2024-10-17 PROCEDURE — 6370000000 HC RX 637 (ALT 250 FOR IP): Performed by: PHYSICIAN ASSISTANT

## 2024-10-17 PROCEDURE — 1180000000 HC REHAB R&B

## 2024-10-17 PROCEDURE — 83735 ASSAY OF MAGNESIUM: CPT

## 2024-10-17 PROCEDURE — 97116 GAIT TRAINING THERAPY: CPT

## 2024-10-17 PROCEDURE — 80048 BASIC METABOLIC PNL TOTAL CA: CPT

## 2024-10-17 PROCEDURE — 99231 SBSQ HOSP IP/OBS SF/LOW 25: CPT | Performed by: PHYSICAL MEDICINE & REHABILITATION

## 2024-10-17 PROCEDURE — 6370000000 HC RX 637 (ALT 250 FOR IP): Performed by: PHYSICAL MEDICINE & REHABILITATION

## 2024-10-17 PROCEDURE — 97535 SELF CARE MNGMENT TRAINING: CPT

## 2024-10-17 PROCEDURE — 97110 THERAPEUTIC EXERCISES: CPT

## 2024-10-17 PROCEDURE — 36415 COLL VENOUS BLD VENIPUNCTURE: CPT

## 2024-10-17 RX ORDER — POLYETHYLENE GLYCOL 3350 17 G/17G
17 POWDER, FOR SOLUTION ORAL DAILY PRN
COMMUNITY
Start: 2024-10-17 | End: 2024-11-16

## 2024-10-17 RX ADMIN — FERROUS SULFATE TAB 325 MG (65 MG ELEMENTAL FE) 325 MG: 325 (65 FE) TAB at 13:01

## 2024-10-17 RX ADMIN — LEVOTHYROXINE SODIUM 50 MCG: 0.05 TABLET ORAL at 05:32

## 2024-10-17 RX ADMIN — AMLODIPINE BESYLATE 10 MG: 10 TABLET ORAL at 08:03

## 2024-10-17 RX ADMIN — ATORVASTATIN CALCIUM 40 MG: 40 TABLET, FILM COATED ORAL at 20:01

## 2024-10-17 RX ADMIN — SERTRALINE 100 MG: 100 TABLET, FILM COATED ORAL at 08:03

## 2024-10-17 RX ADMIN — HYDROCHLOROTHIAZIDE 25 MG: 25 TABLET ORAL at 08:03

## 2024-10-17 RX ADMIN — Medication 9 MG: at 20:01

## 2024-10-17 RX ADMIN — LOSARTAN POTASSIUM 100 MG: 50 TABLET, FILM COATED ORAL at 08:03

## 2024-10-17 RX ADMIN — PANTOPRAZOLE SODIUM 40 MG: 40 TABLET, DELAYED RELEASE ORAL at 05:32

## 2024-10-17 RX ADMIN — ENOXAPARIN SODIUM 40 MG: 100 INJECTION SUBCUTANEOUS at 08:03

## 2024-10-17 NOTE — CARE COORDINATION
RN CM met with the patient and her daughter Chelsy at the bedside and reviewed her discharge plan. She confirmed she is in agreement to discharge home tomorrow with [facility]. Her daughter confirmed she ordered a rollator and it should be delivered today. [facility] will transport the patient home from the hospital. RN CM encouraged them to ask questions. They verbalized understanding and agreement with the discharge plan.    The Important Message from Medicare letter was delivered and explained to the patient. She verbalized understanding of the information and signed the letter. The patient was given a copy of the letter and the original was placed in the chart.

## 2024-10-17 NOTE — PROGRESS NOTES
[place] [address]  Tel: [phone]     Physical Medicine and Rehabilitation Progress Note      Estefanía Hope  Admit Date: 10/11/2024  Admit Diagnosis: Physical debility [R53.81]    Subjective     Patient seen this morning during break in therapy, daughter present. Observed ambulating amongst obstacle with Rollator and PT during family training. No further hypotensive symptoms with hydralazine held. She continues to endorse minimal abdominal pain. AM labs reviewed and discussed. All questions and concerns were addressed at this time.    Objective:     Current Facility-Administered Medications   Medication Dose Route Frequency    melatonin tablet 9 mg  9 mg Oral Nightly    [Held by provider] hydrALAZINE (APRESOLINE) tablet 50 mg  50 mg Oral 3 times per day    acetaminophen (TYLENOL) tablet 650 mg  650 mg Oral Q6H PRN    amLODIPine (NORVASC) tablet 10 mg  10 mg Oral Daily    atorvastatin (LIPITOR) tablet 40 mg  40 mg Oral Nightly    enoxaparin (LOVENOX) injection 40 mg  40 mg SubCUTAneous Daily    ferrous sulfate (IRON 325) tablet 325 mg  325 mg Oral Lunch    levothyroxine (SYNTHROID) tablet 50 mcg  50 mcg Oral QAM AC    losartan (COZAAR) tablet 100 mg  100 mg Oral Daily    And    hydroCHLOROthiazide (HYDRODIURIL) tablet 25 mg  25 mg Oral Daily    oxyCODONE (ROXICODONE) immediate release tablet 5 mg  5 mg Oral Q6H PRN    ondansetron (ZOFRAN-ODT) disintegrating tablet 4 mg  4 mg Oral Q8H PRN    pantoprazole (PROTONIX) tablet 40 mg  40 mg Oral QAM AC    polyethylene glycol (GLYCOLAX) packet 17 g  17 g Oral Daily PRN    sertraline (ZOLOFT) tablet 100 mg  100 mg Oral Daily    bisacodyl (DULCOLAX) suppository 10 mg  10 mg Rectal Daily PRN    senna (SENOKOT) tablet 17.2 mg  2 tablet Oral Nightly PRN    calcium carbonate (TUMS) chewable tablet 500 mg  500 mg Oral TID PRN     Allergies   Allergen Reactions    Levofloxacin Other (See Comments)     Pain,  orientation should be tried  Maintain hydration and bowel function        Disposition: 10/18 to sister’s home with HH PT/OT/RN.  - DME: none anticipated  - Family Training: Ongoing      Outpatient Provider Follow-up Appointments:  Aguilar Beard PA  [address] [place] [address]   [address]  [facility] [address]  [phone]    Go on 10/29/2024  appt @ 3:15pm with ROSMERY Benoit  post op Ex lap 10/4 (Dr Durham) and Ex lap 10/6 Javier Kenyon, APRN - NP  [address] [facility] [address]  [facility] [address]  [phone]    Schedule an appointment as soon as possible for a visit in 10 day(s)  A follow-up with your primary care provider is recommended within 10 days of discharge.    [facility]  [address] [place] [address]  [phone]      The patient is able to tolerate and benefit from multidisciplinary acute inpatient rehabilitation.    Part of this note may have been written by using a voice dictation software. The note has been proof read but may still contain some grammatical/other typographical errors.      Nnamdi Sargent MD  Physical Medicine and Rehabilitation Attending Physician  October 17, 2024

## 2024-10-17 NOTE — PROGRESS NOTES
PHYSICAL THERAPY DISCHARGE SUMMARY    PT Individual Minutes  Time In: 0919  Time Out: 1004  Minutes: 45                   Total Treatment Time:  45 Minutes           Precautions at discharge:      Restrictions/Precautions: Fall Risk, Other (comment) (abdominal incision)  Required Braces or Orthoses?: No               Other position/activity restrictions: abdominal incision, log rolling and supine<>sidelying<>sit for pain control    Impairments:    Decreased LE strength  [x]     Decreased strength trunk/core  [x]     Decreased AROM   []     Decreased PROM  []    Decreased endurance  [x]     Decreased balance sitting  []     Decreased balance standing  [x]     Pain   []     Slow ambulation velocity  [x]    Decreased coordination  [x]    Decreased safety awareness  []       Functional Limitations:   Decreased independence with bed mobility  []     Decreased independence with functional transfers  []     Decreased independence with ambulation  []     Decreased independence with stair negotiation  [x]               Objective:     Vital Signs: /61   Pulse 80   Temp 99.1 °F (37.3 °C) (Oral)   Resp 18   Ht 1.67 m (5' 5.75\")   Wt 91.2 kg (201 lb 1.6 oz)   SpO2 99%   BMI 32.71 kg/m²       Pain level: 0 to 2 out of 10 abdominal incision  Pain location: abdominal incision  Pain interventions: Medication per order, rest, positioning,relaxation, body mechanics       Patient education:    Education Given To: Patient;Family (daughter)  Education Provided: Safety;Transfer Training;Home Exercise Program;Mobility Training;Precautions;DME/Home Modifications;Fall Prevention Strategies  Education Provided Comments: Daughter attend family training.Instructed daughter on how to assist patient with functional mobility and LE HEP  Education Method: Demonstration;Verbal;Printed Information/Hand-outs (LE HEP)  Barriers to Learning: None  Education Outcome: Verbalized understanding;Demonstrated understanding    Interdisciplinary  RLE and down with LLE.  Up/down 6 inch curb step with rollator and SBA with cues for managing Rollator   4 Steps 3   4      12 Steps 88    88           Pt. Left in recliner call bell in reach needs in reach bed/chair alarm activated           PHYSICAL THERAPY PLAN OF CARE     Goals:Discharge Goals:  Pt will demonstrate roll left & right & transition supine<>sit with Independent in 10 days (MET)  2.   Pt. will transfer from bed to/from chair with Independent using the least restrictive device in 10 days (MET)  3.   Pt. will ambulate 250 feet with RW or LRAD and Independent in 10 days  (not met)  4.   Pt. Will ambulate up/down 12 steps with rail and CGA in 10 days (not met)     Pt would benefit from continued skilled physical therapy in order to improve independent functional mobility within the home with use of least restrictive device. Interventions may include range of motion (AROM, PROM B LE/trunk), motor function (B LE/trunk strengthening/coordination), activity tolerance (vitals, oxygen saturation levels), bed mobility training, balance activities, gait training (progressive ambulation program), and functional transfer training.      HEP handout: issued written LE HEP. Able to complete with min assist and cues  SEATED EXERCISES Sets Reps Comments   Ankle PF 3 10    Ankle DF 3 10    Long Arc Quads 3 10    Hip Abduction with red Theraband 3 10    Hamstring Curls with red Theraband 3 10      Pt to be discharged 10/18/2024 with assistance from family.   Instructed patient's family member on how to assist patient with functional mobility and LE HEP. Patient and family member verbalizing and demonstrating understanding of patient /family education/training noted above    Therapy Recommendations upon discharge:    PT   Equipment needs at discharge:  Family purchased rollator     Interdisciplinary Eval, Care Plan, and Patient Education for further information regarding physical therapy discharge summary and plan of

## 2024-10-17 NOTE — PROGRESS NOTES
PHYSICAL THERAPY DAILY NOTE    PT Individual Minutes  Time In: 1030  Time Out: 1111  Minutes: 41                 Total Treatment Time:  41 Minutes    Pt. Seen for: AM, Balance Training, Gait Training, Patient Education, Therapeutic Exercise, Transfer Training, and Other     Subjective: Patient reporting she feels OK. Reports she was able to ambulate without a device prior to hospital stay. Reports she does not feel as steady ambulating without a device         Objective:  Restrictions/Precautions: Fall Risk, Other (comment) (abdominal incision)  Required Braces or Orthoses?: No       Other position/activity restrictions: abdominal incision, log rolling and supine<>sidelying<>sit for pain control         GROSS ASSESSMENT   Patient resting in recliner at beginning of treatment        COGNITION Daily Assessment    Overall Orientation Status: Within Normal Limits   Overall Cognitive Status: WNL        BED/MAT MOBILITY Daily Assessment     Bridging: Modified independent   Rolling to Left: Modified independent  Rolling to Right: Modified independent  Supine to Sit: Modified independent  Sit to Supine: Modified independent  Scooting: Modified independent  Bed Mobility Comments: Increased time and effort to complete with occasional cues for body mechanics        TRANSFERS Daily Assessment    Sit to Stand: Modified independent  Stand to Sit: Modified independent  Stand Pivot Transfers: Modified independent (with rollator)  Car Transfer: Stand by assistance (SPT with rollator)  Comment: Increased time and effort to complete with occasional cues for body mechanics and brake management on rollator          GAIT Daily Assessment    Surface: Level tile  Device: Rollator  Assistance: Modified Independent  Quality of Gait: slow cont step through gait pattern with mild flexed posture  Gait Deviations: Decreased step height;Increased YSABEL;Decreased step length;Decreased arm swing (tendency to keep right hip in ext rot with Right foot

## 2024-10-17 NOTE — PROGRESS NOTES
[facility] OCCUPATIONAL THERAPY DISCHARGE NOTE  OT Individual Minutes  Time In: 0830  Time Out: 0918  Minutes: 48               GOALS:  Long Term Goals:  Time Frame for Long Term Goals: 10 days   LTG 1: Patient will dress UB with Setup Assistance using AE/DME PRN.   LTG 2: Patient will dress LB with SBA using AE/DME PRN.   LTG 3: Patient will don footwear with SBA using AE/DME PRN.   LTG 4: Patient will bathe with SBA using AE/DME PRN.   LTG 5: Patient will toilet with Supervision using AE/DME PRN.   LTG 6: Patient/caregiver will verbalize understanding of OT recommendations regarding ADLs, functional transfers, home safety, AE/DME, energy conservation, safety awareness, activity tolerance, and follow-up therapy to increase safety with functional tasks upon discharge.    Subjective: Patient agreeable to therapy. \"I am not as tired.\"  Pain:  No pain reported throughout ADL session. See family training for R wrist pt/dtr concerns .  Precautions: Falls, Poor Safety Awareness, and [place] Alarm    FUNCTIONAL MOBILITY:        Bed Mobility Modified Independent    Sit to Stand SBA    Transfer  SBA  Transfer Type: SPT  Equipment: RW    Ambulation SBA  Equipment: RW      ACTIVITIES OF DAILY LIVING:    Initial Score Current Score   Eating Independent  MI Independent  MI   Oral Hygiene Independent  MI while sitting in WC at sink Supervision or touching assistance  SupV hair care and oral hygiene standing at sink with RW   Shower/Bathe Partial/moderate assistance  UB at SBA, LB at José while seated on shower bench Supervision or touching assistance  SupV UB/LB bathing seated and standing at TTB with grab bars and HHSH   Upper Body  Dressing Supervision or touching assistance  CGA while seated on shower bench due to increased fatigue following shower Supervision or touching assistance  SBA don/doff shirt seated EOB with 1-3 verbal cues for problem solving   Lower Body Dressing Partial/moderate assistance  José to thread BLE into garment   Supervision or touching assistance  SBA don/doff brief and pants seated and standing at EOB with RW    Donning/ [place] Footwear Dependent   CGA to doff while seated, TD to don due to increased fatigue Supervision or touching assistance  SBA don/doff socks and shoes   Toileting Hygiene Supervision or touching assistance  CGA Supervision or touching assistance  SupV seated and standing at commode with RW and grab bars   Toilet Transfer Supervision or touching assistance  CGA with FWW and grab bars Supervision or touching assistance  SBA with RW and grab bars   Initial Score: Patient's lowest score within first 72 hrs of inpatient rehab stay.   Current Score: Patient's average performance level over the last 48 hours.      Discharge Location: Home with Family Support  Recommended Therapy/Supervision at Discharge: HHOT  Recommended Equipment: RW  Safety/Functional Level: Pt completes ADLs with Modified Independent, Supervision, and SBA using Rolling Walker, Reacher, BSC, Grab Bars, and Tub Transfer Bench. Pt requires Supervision for mobility related ADLs. Pt will require Supervision, SBA, and CGA with IADLs such as home management, cooking, and cleaning.   Family Training: Completed 10/17/24 family present for session. No questions/concerns regarding ADLs and anticipated d/c 10/18/24. Pt dtr reports purchasing TTB. Pt and dtr verbalized concern/questions regarding hx of R wrist fx. Pt reports intermittent pain/discomfort when pressing up with R hand for sit<>stand. Modified method for sit<>stand and pushing up with R hand provided, pt demonstrated understanding and reported decreased pain/discomfort with t/f. Further questions/concerns deferred to local CHT/surgeon after d/c. Pt and dtr verbalized understanding and agreement .  Residual Deficits: Decreased strength, activity tolerance, ROM, coordination, balance, cognition, functional mobility, safety awareness, AE/DME use, ADL performance , and IADL

## 2024-10-17 NOTE — PROGRESS NOTES
[facility] OCCUPATIONAL THERAPY DAILY NOTE  OT Individual Minutes  Time In: 1111  Time Out: 1200  Minutes: 49               Subjective: Pt agreeable to treatment. \"I'm feeling tired.\"   Pain: No pain expressed.  Interdisciplinary Communication: Collaborated with PT regarding handoff communication.   Precautions: Falls, Poor Safety Awareness, and [place] Alarm    FUNCTIONAL MOBILITY:       Bed Mobility NT    Sit to Stand SBA    Transfer  SBA  Transfer Type: SPT  Equipment: RW    Ambulation SBA  Equipment: RW       - Activity Tolerance - Cognition - Coordination   Pt completed therapeutic activities to challenge  BUE AROM/STR, FM coordination/dexterity, bi-manual coordination, activity tolerance, complex problem solving, and STM recall in preparation for safe return to max (I) with I/ADLs. Pt tolerated activities well with no c/o pain. Rest breaks utilized as needed throughout.  Pt completed tri-ominos activity seated at table. Pt retrieved 9 pieces from [employer] and placed pieces on rack. Pt scanned, identified and problem solved for pieces matching/necessary for playing. If none found pt ulisses from [employer]. Pt completed 1 game(s). Pt required significantly increased time with 5+ verbal cues for problem solving and attention to task throughout.        - Self-Care   Pt completed self-care seated and standing at commode with rollator and grab bars to challenge BUE function, functional mobility and (I) with ADLs in preparation for safe return to max (I) with ADLs. Pt tolerated self-care well with no c/o pain. Rest breaks utilized as needed throughout.  Pt completed:  Toileting; SBA  seated and standing at commode with rollator and grab bars  and Functional t/f training; SBA w/c to commode and commode to recliner with rollator to address functional household mobility.     Education   Adaptive ADL Techniques, Benefits of OT, Energy Conservation, Pacing, Family Training, Functional Transfer Training, Rolling Walker

## 2024-10-18 VITALS
SYSTOLIC BLOOD PRESSURE: 137 MMHG | RESPIRATION RATE: 16 BRPM | HEIGHT: 66 IN | DIASTOLIC BLOOD PRESSURE: 68 MMHG | TEMPERATURE: 98.5 F | HEART RATE: 75 BPM | WEIGHT: 201.1 LBS | BODY MASS INDEX: 32.32 KG/M2 | OXYGEN SATURATION: 95 %

## 2024-10-18 PROCEDURE — 99239 HOSP IP/OBS DSCHRG MGMT >30: CPT | Performed by: PHYSICAL MEDICINE & REHABILITATION

## 2024-10-18 PROCEDURE — 6360000002 HC RX W HCPCS: Performed by: PHYSICAL MEDICINE & REHABILITATION

## 2024-10-18 PROCEDURE — 6370000000 HC RX 637 (ALT 250 FOR IP): Performed by: PHYSICAL MEDICINE & REHABILITATION

## 2024-10-18 PROCEDURE — 97535 SELF CARE MNGMENT TRAINING: CPT

## 2024-10-18 RX ADMIN — SERTRALINE 100 MG: 100 TABLET, FILM COATED ORAL at 08:03

## 2024-10-18 RX ADMIN — LOSARTAN POTASSIUM 100 MG: 50 TABLET, FILM COATED ORAL at 08:03

## 2024-10-18 RX ADMIN — PANTOPRAZOLE SODIUM 40 MG: 40 TABLET, DELAYED RELEASE ORAL at 06:04

## 2024-10-18 RX ADMIN — HYDROCHLOROTHIAZIDE 25 MG: 25 TABLET ORAL at 08:03

## 2024-10-18 RX ADMIN — ENOXAPARIN SODIUM 40 MG: 100 INJECTION SUBCUTANEOUS at 08:03

## 2024-10-18 RX ADMIN — AMLODIPINE BESYLATE 10 MG: 10 TABLET ORAL at 08:03

## 2024-10-18 RX ADMIN — LEVOTHYROXINE SODIUM 50 MCG: 0.05 TABLET ORAL at 06:04

## 2024-10-18 NOTE — DISCHARGE SUMMARY
weeks.  Renewal of services to be prescribed by PCP.      Discharged Condition: good    Principal Problem:    Physical debility  Active Problems:    Osteoarthritis of right knee    Status post total knee replacement    GERD (gastroesophageal reflux disease)    Acute blood loss anemia    HTN (hypertension)    Hypothyroidism    Small bowel obstruction (HCC)    Ischemia, bowel (HCC)    Impaired functional mobility, balance, gait, and endurance    Decreased independence with activities of daily living    Encounter for rehabilitation  Resolved Problems:    * No resolved hospital problems. *      HPI:  \"Estefanía Hope is a 73 y.o. female patient who presented to [facility] 10/[facility]/2024 secondary to abdominal pain, with finding of SBO/internal hernia, ischemic bowel and septic shock. She required emergent ex-lap converted to open small bowel resection on 10/4, and ex-lap second look operation with bowel primary anastomosis on 10/6. She initially had slow to recover bowel function, although with excellent improvements in the last 2 days. She very briefly required TPN but is now on a soft and bite sized diet. Additional hospital issues: mild hypoxia (resolved), hypertension (ongoing), post-op anemia (stable). Patient seen at bedside today, with family present. Patient reports that pain has not been a significant issue post-operatively. She is passing gas. She does feel somewhat debilitated although is tolerating more activity today compared to prior. PT/OT assessed, noted good baseline of function, and recommended IRF once medically stable.   Co-morbidities: Hypothyroidism, chronic Left shoulder pain/OA/RTC injury  Of note, she lives with her daughter in [place], and was out her visiting her sister when she required hospitalization. \"      Physical Exam:  GENERAL: Alert, NAD. Pleasant. Sitting in chair.  HEENT: EOMI, NC/AT.  HEART: RRR, no appreciable murmur.  LUNGS: Clear breath sounds bilaterally, no  Creatinine 0.86 0.60 - 1.10 MG/DL    Est, Glom Filt Rate 71 >60 ml/min/1.73m2    Calcium 9.3 8.8 - 10.2 MG/DL   Magnesium    Collection Time: 10/17/24  5:22 AM   Result Value Ref Range    Magnesium 1.9 1.8 - 2.4 mg/dL            Medication List        START taking these medications      polyethylene glycol 17 g packet  Commonly known as: GLYCOLAX  Take 1 packet by mouth daily as needed for Constipation            CONTINUE taking these medications      amLODIPine 10 MG tablet  Commonly known as: NORVASC  Take 1 tablet by mouth daily     ascorbic acid 500 MG tablet  Commonly known as: VITAMIN C     CALCIUM PO     Coenzyme Q10 10 MG Caps     ferrous sulfate 325 (65 Fe) MG tablet  Commonly known as: IRON 325     hydroCHLOROthiazide 25 MG tablet  Commonly known as: HYDRODIURIL  Take 1 tablet by mouth daily     levothyroxine 50 MCG tablet  Commonly known as: SYNTHROID     losartan 100 MG tablet  Commonly known as: COZAAR  Take 1 tablet by mouth daily     omeprazole 20 MG delayed release capsule  Commonly known as: PRILOSEC     sertraline 100 MG tablet  Commonly known as: ZOLOFT     simvastatin 20 MG tablet  Commonly known as: ZOCOR            STOP taking these medications      hydrALAZINE 25 MG tablet  Commonly known as: APRESOLINE     oxyCODONE-acetaminophen 5-325 MG per tablet  Commonly known as: Percocet               Where to Get Your Medications        Information about where to get these medications is not yet available    Ask your nurse or doctor about these medications  polyethylene glycol 17 g packet            Current Discharge Medication List        STOP taking these medications       hydrALAZINE (APRESOLINE) 25 MG tablet Comments:   Reason for Stopping:         oxyCODONE-acetaminophen (PERCOCET) 5-325 MG per tablet Comments:   Reason for Stopping:                Meds reviewed with patient prior to discharge  NO NEW PRESCRIPTIONS ON DISCHARGE    Time Spent on Discharge:  On the date of discharge, 35 minutes

## 2024-10-18 NOTE — PROGRESS NOTES
[facility] OCCUPATIONAL THERAPY DAILY NOTE  OT Individual Minutes  Time In: 0828  Time Out: 0910  Minutes: 42               Subjective: Pt agreeable to treatment. \"I'm sleepy today.\"  Pain: No pain expressed.  Interdisciplinary Communication: Collaborated with RN regarding pt status.   Precautions: Falls, Poor Safety Awareness, and Damien Alarm    FUNCTIONAL MOBILITY:       Bed Mobility Modified Independent    Sit to Stand Supervision and SBA    Transfer  Supervision and SBA  Transfer Type: SPT  Equipment: Rollator    Ambulation Supervision and SBA  Equipment: Rollator      ACTIVITIES OF DAILY LIVING:       Eating       Oral Hygiene Supervision or touching assistance SupV hair care in recliner; oral hygiene standing at sink with Rollator   Shower/Bathe Supervision or touching assistance SupV UB/LB seated and standing at TTB with HHSH; increased bilateral foot care and hygiene seated in recliner   Upper Body  Dressing Setup or clean-up assistance STEPHENS don/doff shirt seated at EOB   Lower Body Dressing Supervision or touching assistance SupV don/doff pants and brief seated and standing at EOB with Rollator and reacher   Donning/Dixon Footwear Setup or clean-up assistance STEPHENS don/doff shoes and socks seated in recliner   Toileting Hygiene Supervision or touching assistance SupV seated and standing at commode with Rollator and grab bars   Toilet Transfer Supervision or touching assistance SBA with Rollator   Education Adaptive ADL Techniques, AE/DME Training, Benefits of OT, Energy Conservation, Pacing, Functional Transfer Training, Rolling Walker Management, and Safety Awareness     Assessment: Patient tolerated session well. Pt reported feeling constipated this AM. RN notified and aware. Increased seated rest breaks frequency and duration secondary to fatigue. No light headedness or dizziness reported present day. Pt demonstrated good participation in OT treatment. Pt continues to benefit from skilled OT services to address

## 2024-10-18 NOTE — Clinical Note
[facility] OCCUPATIONAL THERAPY INITIAL EVALUATION                  HPI (per MD report): \"***\"  Past Medical History:   Diagnosis Date    Anemia     taking iron    Arrhythmia     had ablation-2008- no problems since    Arthritis     osteoarthritis knees, fingers    Asthma      no problems except in fall season-- no meds regularly    Chronic pain     back    Gastrointestinal disorder     GERD    GERD (gastroesophageal reflux disease)     controlled with medication    High cholesterol     on medication    Hypertension x4 yrs     controlled with med     Morbid obesity     bmi-42.2 on 5-20-14    Nausea & vomiting     not every time per pt    Other ill-defined conditions(799.89)     hyperlipidemia    Psychiatric disorder     depression    Thyroid disease      hypo-med       Patient's Goal: ***  Pain: {JPPAIN:84077}.  Precautions: {JPpre:04611::\"Falls\"}  Prior Level of Function: ***    Vitals  O2 Device: {YDHk2utu9779:00308}    BP: {LVZcwcigssj1582:28785} ***/***;  HR: ***bpm;  SpO2: *** via ***  BP: {LPGiwwuzode5939:27365} ***/***;  HR: ***bpm;  SpO2: *** via ***  BP: {UROauexqgqe8169:95266} ***/***;  HR: ***bpm;  SpO2: *** via ***    LIVING SITUATION:    Environment   Living Alone {TZHmyoo5131:37607}   Support System {JPsupport:26730}   Home Set Up {CNLxejqRD98863:09628} {JPhomeSU2:75335}   Home Entrance {JPhomeEntry:21694}   Bathroom Setup  {MAPgzfdzicwHL3838:45522}   Current DME {[facility] DME:57330}     UPPER EXTREMITY ASSESSMENT:   AIMEE FREEMAN   General Evaluation {JPgrossUE:09359} {JPgrossUE:56090}   [facility] {JPgrossUE:33252} {JPgrossUE:33777}   [facility] {JPgrossUE:12226} {JPgrossUE:79490}   Sensation {RKWgnchzucpf2095:47677} {KXKclzypfben6481:79413}   ROM {JPgrossUE:25991} {JPgrossUE:49880}   Proprioception {JPintact:01094} {JPintact:19477}   Subluxation {JPy/d+:25180} {JPy/d+:77101}   Inattention/Neglect {JPy/d+:20296} {JPy/d+:50282}   Muscle Tone {JPtone:55955} {JPtone:94594}     STRENGTH: RUE LUE   Shoulder Flexion {[facility] MMT:58508} {IRC  agreeable to OT evaluation. Pt completed the following with details recorded above: MMT/ROM, ADLs, BIMS, and informal OT interview.   Interdisciplinary Communication: Collaborated with {JPIPCOLLAB:01999} regarding patient's current level of function, plan of care, and safety measures.   Patient/Family Education: {OT D [facility] EDUCATION:09193319} educated {OT [facility] OT EDU:69732011}.    GOALS:  Short Term Goals:  Time Frame for Short Term Goals: 7 days   STG 1: Patient will dress UB with {jpqualityindicators:28149} using AE/DME PRN.  STG 2: Patient will dress LB with {jpqualityindicators:74701} using AE/DME PRN.  STG 3: Patient will don footwear with {jpqualityindicators:49675} using AE/DME PRN.  STG 4: Patient will bathe with {jpqualityindicators:77315} using AE/DME PRN.  STG 5: Patient will toilet with {jpqualityindicators:26928} using AE/DME PRN.    Long Term Goals:  Time Frame for Long Term Goals: *** days   LTG 1: Patient will dress UB with {jpqualityindicators:85721} using AE/DME PRN.   LTG 2: Patient will dress LB with {jpqualityindicators:43262} using AE/DME PRN.   LTG 3: Patient will don footwear with {jpqualityindicators:31766} using AE/DME PRN.   LTG 4: Patient will bathe with {jpqualityindicators:65905} using AE/DME PRN.   LTG 5: Patient will toilet with {jpqualityindicators:65335} using AE/DME PRN.   LTG 6: Patient/caregiver will verbalize understanding of OT recommendations regarding ADLs, functional transfers, home safety, AE/DME, energy conservation, safety awareness, activity tolerance, and follow-up therapy to increase safety with functional tasks upon discharge.    MD order for OT received and chart reviewed. Patient will benefit from skilled OT services to address deficits to maximize functional performance with self-care tasks and functional mobility. Treatment interventions may include {GPDpholfcoznfscy3184:68187:a}. Patient to be seen at least 5 times per week for at least 1.5 hours of OT per

## 2024-10-22 ENCOUNTER — TELEPHONE (OUTPATIENT)
Dept: SURGERY | Age: 74
End: 2024-10-22

## 2024-10-22 NOTE — TELEPHONE ENCOUNTER
Returned call to Jenny at [facility] [place]. She was requesting a verbal authorization to see patient 1 x per week. This CMA gave verbal authorization.

## 2024-10-23 ENCOUNTER — TELEPHONE (OUTPATIENT)
Dept: SURGERY | Age: 74
End: 2024-10-23

## 2024-10-23 NOTE — TELEPHONE ENCOUNTER
Returned call to Henok, occupational therapist with [facility]. Henok was requesting verbal auth to see patient for 5 visits to work on safety, balance, and strengthening. This CMA gave verbal auth.

## 2024-10-29 ENCOUNTER — OFFICE VISIT (OUTPATIENT)
Dept: SURGERY | Age: 74
End: 2024-10-29

## 2024-10-29 DIAGNOSIS — K55.9 ISCHEMIC BOWEL DISEASE (HCC): Primary | ICD-10-CM

## 2024-10-29 PROCEDURE — 99024 POSTOP FOLLOW-UP VISIT: CPT

## 2024-10-29 NOTE — PROGRESS NOTES
poDate: 10/29/2024      Name: Estefanía Hope      MRN: [id number]       : 1950       Age: 73 y.o.    Sex: female        Javier AvilezMaribell, APRN - NP       CC:  No chief complaint on file.      HPI:  The patient presents for a post-op visit s/p small bowel resection by Dr. Montero. The patient reports that they are doing well. Pt has some mild post op pain, however, they deny any new or worsening symptoms. Pt denies any difficulty with eating, voiding, or movement. Wound is well appearing with no signs of infection.      Microscopic Description   . Microscopic examination reveals sections of small intestine having   fibrous serosal adhesions with focal acute inflammation. Malignant tumor   is not identified. Changes consistent with ischemia are noted. Dr. Stanley Greenberg will concur     Specimen(s) Received   A: Segment of Small Bowel     Physical Exam:     There were no vitals taken for this visit.    General: Alert, oriented, cooperative in no acute distress.     Neck: Supple, trachea midline, no appreciable thyromegaly  Resp: Breathing is  non-labored. Lungs clear to auscultation without wheezing or rhonchi   CV: RRR. No murmurs, rubs or gallops appreciated.  Abd: soft non-tender and non-distended without peritoneal signs. +bs    Skin/incision: No evidence of infection.      Assessment/Plan:  Estefanía Hope is a 73 y.o. female who is s/p small bowel resection.    - Staples were removed.  - avoid heavy lifting for 4-6 weeks.  - Avoid submerging in water for 3 weeks post op.  - Call with any questions or concerns.  - Follow up as needed.      ROSMERY Luna   10/29/2024  12:15 PM

## 2024-11-19 ENCOUNTER — TELEPHONE (OUTPATIENT)
Dept: SURGERY | Age: 74
End: 2024-11-19

## 2024-11-19 NOTE — TELEPHONE ENCOUNTER
Patient states that when she was in to see you, you recommended avoiding aspirin and or Advil. Is she able to take those now?

## (undated) DEVICE — SKIN MARKER,REGULAR TIP WITH RULER AND LABELS: Brand: DEVON

## (undated) DEVICE — ELECTRODE NDL 2.8IN COAT VALLEYLAB

## (undated) DEVICE — DRAPE TWL SURG 16X26IN BLU ORB04] ALLCARE INC]

## (undated) DEVICE — NEEDLE HYPO 25GA L1.5IN BLU POLYPR HUB S STL REG BVL STR

## (undated) DEVICE — STERILE LATEX POWDER FREE SURGICAL GLOVES WITH HYDROGEL COATING: Brand: PROTEXIS

## (undated) DEVICE — SEALER ENDOSCP NANO COAT OPN DIV CRV L JAW LIGASURE IMPACT

## (undated) DEVICE — GARMENT,MEDLINE,DVT,INT,CALF,MED, GEN2: Brand: MEDLINE

## (undated) DEVICE — INTENDED FOR TISSUE SEPARATION, AND OTHER PROCEDURES THAT REQUIRE A SHARP SURGICAL BLADE TO PUNCTURE OR CUT.: Brand: BARD-PARKER SAFETY BLADES SIZE 15, STERILE

## (undated) DEVICE — SPONGE LAP 18X18IN STRL -- 5/PK

## (undated) DEVICE — SOLUTION IV 1000ML 0.9% SOD CHL

## (undated) DEVICE — GAUZE,SPONGE,4"X4",16PLY,STRL,LF,10/TRAY: Brand: MEDLINE

## (undated) DEVICE — KIT DSG ABTHERA SENSATRAC

## (undated) DEVICE — SURGICAL PROCEDURE PACK BASIC ST FRANCIS

## (undated) DEVICE — 1LYRTR 16FR10ML 100%SILI SNAP: Brand: MEDLINE INDUSTRIES, INC.

## (undated) DEVICE — ABSORBENT, WATERPROOF, BACTERIA PROOF FILM DRESSING: Brand: OPSITE POST OP 9.5X8.5CM CTN 20

## (undated) DEVICE — CANISTER WND THER 500 ML NEG PRESSURE GEL TBNG STRL DISP

## (undated) DEVICE — DRESSING,GAUZE,XEROFORM,CURAD,5"X9",ST: Brand: CURAD

## (undated) DEVICE — Device

## (undated) DEVICE — DRAPE,CHEST,FENES,15X10,STERIL: Brand: MEDLINE

## (undated) DEVICE — RELOAD STPL L55MM OPN H3.8MM CLS H1.5MM WIRE DIA0.2MM REG

## (undated) DEVICE — YANKAUER,BULB TIP,W/O VENT,RIGID,STERILE: Brand: MEDLINE

## (undated) DEVICE — TETRA-FLEX CF WOVEN LATEX FREE ELASTIC BANDAGE 6" X 11 YD: Brand: TETRA-FLEX™CF

## (undated) DEVICE — STAPLER INT L55MM CUT LN L53MM STPL LN L57MM BLU B FRM 8

## (undated) DEVICE — ELECTRO LUBE IS A SINGLE PATIENT USE DEVICE THAT IS INTENDED TO BE USED ON ELECTROSURGICAL ELECTRODES TO REDUCE STICKING.: Brand: KEY SURGICAL ELECTRO LUBE

## (undated) DEVICE — TIP CLEANR ELECSURG 1.75X1.75 --

## (undated) DEVICE — CONTAINER SPEC HISTOLOGY 900ML POLYPR

## (undated) DEVICE — MICRODISSECTION NEEDLE STRAIGHT SLEEVE: Brand: COLORADO

## (undated) DEVICE — TUBING SUCT L9FT FOR AUTOFUSE INFLTR SYS

## (undated) DEVICE — ROBOTIC COLON: Brand: MEDLINE INDUSTRIES, INC.

## (undated) DEVICE — SUTURE VCRL SZ 3-0 L18IN ABSRB UD PS-2 L19MM 1/2 CIR J497G

## (undated) DEVICE — PREP SKN CHLRAPRP APL 26ML STR --

## (undated) DEVICE — SUTURE STRATAFIX SZ 3-0 L14CM NONABSORBABLE UD L19MM FS-2 SXMP2B406

## (undated) DEVICE — STAPLER SKIN SQ 30 ABSRB STPL DISP INSORB

## (undated) DEVICE — AMD ANTIMICROBIAL BANDAGE ROLL,6 PLY: Brand: KERLIX

## (undated) DEVICE — SUTURE STRATAFIX SPRL SZ 4-0 L5IN ABSRB UD FS-2 L19MM 3/8 SXMP2B407

## (undated) DEVICE — BUTTON SWITCH PENCIL BLADE ELECTRODE, HOLSTER: Brand: EDGE

## (undated) DEVICE — SPONGE LAP W18XL18IN WHT COT 4 PLY FLD STRUNG RADPQ DISP ST 2 PER PACK

## (undated) DEVICE — SOLUTION ANTIFOG VIS SYS CLEARIFY LAPSCP

## (undated) DEVICE — DISPOSABLE GRASPER CARTRIDGE: Brand: DIRECT DRIVE REPOSABLE GRASPERS

## (undated) DEVICE — TROCAR: Brand: KII FIOS FIRST ENTRY

## (undated) DEVICE — SUTURE PDS II SZ 1 L54IN ABSRB VLT L65MM TP-1 1/2 CIR Z879G

## (undated) DEVICE — OCCLUSIVE GAUZE STRIP,3% BISMUTH TRIBROMOPHENATE IN PETROLATUM BLEND: Brand: XEROFORM

## (undated) DEVICE — REM POLYHESIVE ADULT PATIENT RETURN ELECTRODE: Brand: VALLEYLAB

## (undated) DEVICE — SUTURE VICRYL + SZ 3-0 L27IN ABSRB UD L26MM SH 1/2 CIR VCP416H

## (undated) DEVICE — SUTURE PERMAHAND SZ 3-0 L18IN NONABSORBABLE BLK L26MM SH C013D

## (undated) DEVICE — SUTURE PERMA-HAND SZ 2-0 L30IN NONABSORBABLE BLK L26MM SH K833H

## (undated) DEVICE — BLADE ES L6IN ELASTOMERIC COAT EXT DURABLE BEND UPTO 90DEG

## (undated) DEVICE — NEEDLE INSUF L150MM DIA2MM DISP FOR PNEUMOPERI ENDOPATH

## (undated) DEVICE — NEPTUNE E-SEP SMOKE EVACUATION PENCIL, COATED, 70MM BLADE, PUSH BUTTON SWITCH: Brand: NEPTUNE E-SEP

## (undated) DEVICE — SUTURE VCRL SZ 3-0 L27IN ABSRB UD L19MM PS-2 3/8 CIR PRIM J427H

## (undated) DEVICE — TROCAR: Brand: KII® SLEEVE

## (undated) DEVICE — INTENDED FOR TISSUE SEPARATION, AND OTHER PROCEDURES THAT REQUIRE A SHARP SURGICAL BLADE TO PUNCTURE OR CUT.: Brand: BARD-PARKER SAFETY BLADES SIZE 10, STERILE

## (undated) DEVICE — NEEDLE SPNL 22GA L3.5IN BLK HUB S STL REG WALL FIT STYL W/

## (undated) DEVICE — CATH IV NSAF ANGIOCATH 16G --

## (undated) DEVICE — SOLUTION IRRIG 1000ML 0.9% SOD CHL USP POUR PLAS BTL

## (undated) DEVICE — MAJOR GENERAL: Brand: MEDLINE INDUSTRIES, INC.

## (undated) DEVICE — 2000CC GUARDIAN II: Brand: GUARDIAN

## (undated) DEVICE — TRAY PREP DRY W/ PREM GLV 2 APPL 6 SPNG 2 UNDPD 1 OVERWRAP

## (undated) DEVICE — SUTURE PERMAHAND SZ 2-0 L18IN NONABSORBABLE BLK L26MM SH C012D

## (undated) DEVICE — GLOVE SURG SZ 75 CRM LTX FREE POLYISOPRENE POLYMER BEAD ANTI

## (undated) DEVICE — SUTURE PERMAHAND SZ 3-0 L30IN NONABSORBABLE BLK L26MM SH C017D